# Patient Record
Sex: MALE | Race: WHITE | Employment: PART TIME | URBAN - METROPOLITAN AREA
[De-identification: names, ages, dates, MRNs, and addresses within clinical notes are randomized per-mention and may not be internally consistent; named-entity substitution may affect disease eponyms.]

---

## 2017-10-25 PROBLEM — E11.9 CONTROLLED TYPE 2 DIABETES MELLITUS WITHOUT COMPLICATION (HCC): Status: ACTIVE | Noted: 2017-10-25

## 2017-10-25 PROBLEM — J44.9 COPD (CHRONIC OBSTRUCTIVE PULMONARY DISEASE) (HCC): Status: ACTIVE | Noted: 2017-10-25

## 2017-10-25 PROBLEM — M17.0 PRIMARY OSTEOARTHRITIS OF BOTH KNEES: Status: ACTIVE | Noted: 2017-10-25

## 2017-10-25 PROBLEM — E66.01 OBESITY, MORBID, BMI 40.0-49.9 (HCC): Status: ACTIVE | Noted: 2017-10-25

## 2017-10-25 PROBLEM — R60.0 LOCALIZED EDEMA: Status: ACTIVE | Noted: 2017-10-25

## 2017-11-12 ENCOUNTER — APPOINTMENT (OUTPATIENT)
Dept: ULTRASOUND IMAGING | Age: 67
End: 2017-11-12
Payer: COMMERCIAL

## 2017-11-12 ENCOUNTER — HOSPITAL ENCOUNTER (EMERGENCY)
Age: 67
Discharge: HOME | End: 2017-11-12
Attending: EMERGENCY MEDICINE
Payer: COMMERCIAL

## 2017-11-12 VITALS
BODY MASS INDEX: 39.4 KG/M2 | OXYGEN SATURATION: 93 % | HEART RATE: 94 BPM | WEIGHT: 260 LBS | DIASTOLIC BLOOD PRESSURE: 81 MMHG | TEMPERATURE: 97.9 F | SYSTOLIC BLOOD PRESSURE: 140 MMHG | RESPIRATION RATE: 18 BRPM | HEIGHT: 68 IN

## 2017-11-12 DIAGNOSIS — L03.115 CELLULITIS OF RIGHT LEG: Primary | ICD-10-CM

## 2017-11-12 LAB
ABSOLUTE EOS #: 0.15 K/UL (ref 0–0.4)
ABSOLUTE IMMATURE GRANULOCYTE: ABNORMAL K/UL (ref 0–0.3)
ABSOLUTE LYMPH #: 2.94 K/UL (ref 1–4.8)
ABSOLUTE MONO #: 1.91 K/UL (ref 0.1–0.8)
ANION GAP SERPL CALCULATED.3IONS-SCNC: 13 MMOL/L (ref 9–17)
BASOPHILS # BLD: 1 %
BASOPHILS ABSOLUTE: 0.15 K/UL (ref 0–0.2)
BUN BLDV-MCNC: 15 MG/DL (ref 8–23)
BUN/CREAT BLD: ABNORMAL (ref 9–20)
CALCIUM SERPL-MCNC: 9.1 MG/DL (ref 8.6–10.4)
CHLORIDE BLD-SCNC: 94 MMOL/L (ref 98–107)
CO2: 28 MMOL/L (ref 20–31)
CREAT SERPL-MCNC: 0.61 MG/DL (ref 0.7–1.2)
DIFFERENTIAL TYPE: ABNORMAL
EOSINOPHILS RELATIVE PERCENT: 1 %
GFR AFRICAN AMERICAN: >60 ML/MIN
GFR NON-AFRICAN AMERICAN: >60 ML/MIN
GFR SERPL CREATININE-BSD FRML MDRD: ABNORMAL ML/MIN/{1.73_M2}
GFR SERPL CREATININE-BSD FRML MDRD: ABNORMAL ML/MIN/{1.73_M2}
GLUCOSE BLD-MCNC: 90 MG/DL (ref 70–99)
HCT VFR BLD CALC: 37.8 % (ref 41–53)
HEMOGLOBIN: 13 G/DL (ref 13.5–17.5)
IMMATURE GRANULOCYTES: ABNORMAL %
LYMPHOCYTES # BLD: 20 %
MCH RBC QN AUTO: 31.1 PG (ref 26–34)
MCHC RBC AUTO-ENTMCNC: 34.3 G/DL (ref 31–37)
MCV RBC AUTO: 90.6 FL (ref 80–100)
MONOCYTES # BLD: 13 %
MORPHOLOGY: NORMAL
PDW BLD-RTO: 13.1 % (ref 12.5–15.4)
PLATELET # BLD: 335 K/UL (ref 140–450)
PLATELET ESTIMATE: ABNORMAL
PMV BLD AUTO: 6.7 FL (ref 6–12)
POTASSIUM SERPL-SCNC: 3.9 MMOL/L (ref 3.7–5.3)
RBC # BLD: 4.17 M/UL (ref 4.5–5.9)
RBC # BLD: ABNORMAL 10*6/UL
SEG NEUTROPHILS: 65 %
SEGMENTED NEUTROPHILS ABSOLUTE COUNT: 9.55 K/UL (ref 1.8–7.7)
SODIUM BLD-SCNC: 135 MMOL/L (ref 135–144)
WBC # BLD: 14.7 K/UL (ref 3.5–11)
WBC # BLD: ABNORMAL 10*3/UL

## 2017-11-12 PROCEDURE — 2580000003 HC RX 258: Performed by: EMERGENCY MEDICINE

## 2017-11-12 PROCEDURE — 87040 BLOOD CULTURE FOR BACTERIA: CPT

## 2017-11-12 PROCEDURE — 36415 COLL VENOUS BLD VENIPUNCTURE: CPT

## 2017-11-12 PROCEDURE — 93971 EXTREMITY STUDY: CPT

## 2017-11-12 PROCEDURE — 6360000002 HC RX W HCPCS: Performed by: EMERGENCY MEDICINE

## 2017-11-12 PROCEDURE — 80048 BASIC METABOLIC PNL TOTAL CA: CPT

## 2017-11-12 PROCEDURE — 99284 EMERGENCY DEPT VISIT MOD MDM: CPT

## 2017-11-12 PROCEDURE — 96365 THER/PROPH/DIAG IV INF INIT: CPT

## 2017-11-12 PROCEDURE — 85025 COMPLETE CBC W/AUTO DIFF WBC: CPT

## 2017-11-12 PROCEDURE — 96366 THER/PROPH/DIAG IV INF ADDON: CPT

## 2017-11-12 RX ORDER — CEPHALEXIN 500 MG/1
500 CAPSULE ORAL 2 TIMES DAILY
Qty: 20 CAPSULE | Refills: 0 | Status: SHIPPED | OUTPATIENT
Start: 2017-11-12 | End: 2017-12-08

## 2017-11-12 RX ORDER — CYCLOBENZAPRINE HCL 10 MG
10 TABLET ORAL 3 TIMES DAILY PRN
COMMUNITY
End: 2017-12-29

## 2017-11-12 RX ORDER — SULFAMETHOXAZOLE AND TRIMETHOPRIM 800; 160 MG/1; MG/1
1 TABLET ORAL 2 TIMES DAILY
Qty: 20 TABLET | Refills: 0 | Status: SHIPPED | OUTPATIENT
Start: 2017-11-12 | End: 2017-11-22

## 2017-11-12 RX ORDER — OXYCODONE HYDROCHLORIDE AND ACETAMINOPHEN 5; 325 MG/1; MG/1
1 TABLET ORAL EVERY 4 HOURS PRN
COMMUNITY
End: 2018-01-10 | Stop reason: SDUPTHER

## 2017-11-12 RX ORDER — VANCOMYCIN HYDROCHLORIDE 1 G/200ML
1000 INJECTION, SOLUTION INTRAVENOUS ONCE
Status: COMPLETED | OUTPATIENT
Start: 2017-11-12 | End: 2017-11-12

## 2017-11-12 RX ADMIN — WATER 1 G: 1 INJECTION INTRAMUSCULAR; INTRAVENOUS; SUBCUTANEOUS at 16:14

## 2017-11-12 RX ADMIN — VANCOMYCIN HYDROCHLORIDE 1000 MG: 1 INJECTION, SOLUTION INTRAVENOUS at 16:14

## 2017-11-12 ASSESSMENT — PAIN DESCRIPTION - ORIENTATION: ORIENTATION: RIGHT

## 2017-11-12 ASSESSMENT — PAIN SCALES - GENERAL: PAINLEVEL_OUTOF10: 8

## 2017-11-12 ASSESSMENT — PAIN DESCRIPTION - LOCATION: LOCATION: LEG

## 2017-11-12 ASSESSMENT — PAIN DESCRIPTION - PAIN TYPE: TYPE: ACUTE PAIN

## 2017-11-12 NOTE — ED PROVIDER NOTES
IN)    Inhale into the lungs    CYCLOBENZAPRINE (FLEXERIL) 10 MG TABLET    Take 10 mg by mouth 3 times daily as needed for Muscle spasms    HYDROCHLOROTHIAZIDE (HYDRODIURIL) 25 MG TABLET        MELOXICAM (MOBIC) 15 MG TABLET        METFORMIN HCL PO    Take by mouth    MOEXIPRIL (UNIVASC) 15 MG TABLET        NAPROXEN PO    Take 1,000 mg by mouth daily     OXYCODONE-ACETAMINOPHEN (PERCOCET) 5-325 MG PER TABLET    Take 1 tablet by mouth every 4 hours as needed for Pain . RIVAROXABAN (XARELTO) 10 MG TABS TABLET    Take 10 mg by mouth       ALLERGIES     has No Known Allergies. FAMILY HISTORY     has no family status information on file. family history is not on file. SOCIAL HISTORY      reports that he quit smoking about 4 weeks ago. His smoking use included Cigarettes. He has a 42.00 pack-year smoking history. He has never used smokeless tobacco. He reports that he does not drink alcohol or use drugs. PHYSICAL EXAM     INITIAL VITALS:  height is 5' 8\" (1.727 m) and weight is 117.9 kg (260 lb). His oral temperature is 97.9 °F (36.6 °C). His blood pressure is 140/81 (abnormal) and his pulse is 94. His respiration is 18 and oxygen saturation is 93%. Constitutional: The patient is alert, well-developed, in no acute distress. Vital signs as noted. Right lower extremity: There is an incision over the anterior portion of the knee from his recent joint replacement. The wound edges are red. No purulence however at the distal portion of the incision there is an area that appears abraded. He has no thigh pain. There is marked edema and ecchymosis noted of the knee and the lower leg. There is no. Discharge rest at this time. There is no abscess or lymphangitis noted. There is pain throughout the right calf. Distal pulses are diminished but present. There is no cyanosis.   The right ankle and foot are normal.  Left leg appears normal.       DIFFERENTIAL DIAGNOSIS/ MEDICAL DECISION MAKING:

## 2017-11-12 NOTE — ED NOTES
Pt brought to ed by wife with c/o leg swelling s/p TKA last Tuesday. Pt states his leg has had increased swelling over the past day. He states that there is also a small opened area at the base of the incision that is draining and he feels \"is infected. \"  Pt reports that he called the surgeon who advised him to be seen at an ED. Upon arrival pt is awake, alert and appropriate. He is brought back via wheelchair and is able to transfer to the bed per self, however, he does appear to be very uncomfortable with weight bearing. There is obvious swelling and large amts of bruising noted to the entire leg. The leg is firm and warm to the touch. Steri-strips are in tact and there is a small opened area noted at the distal end of the steri-strips. Call light placed within reach. Denies needs - will continue to monitor.       Arley Ahn RN  11/12/17 [id number]

## 2017-11-18 LAB
CULTURE: NORMAL
Lab: NORMAL
SPECIMEN DESCRIPTION: NORMAL
STATUS: NORMAL
STATUS: NORMAL

## 2018-01-10 PROBLEM — M54.50 ACUTE MIDLINE LOW BACK PAIN WITHOUT SCIATICA: Status: ACTIVE | Noted: 2018-01-10

## 2018-04-05 PROBLEM — M51.36 DDD (DEGENERATIVE DISC DISEASE), LUMBAR: Status: ACTIVE | Noted: 2018-04-05

## 2018-04-05 PROBLEM — M51.369 DDD (DEGENERATIVE DISC DISEASE), LUMBAR: Status: ACTIVE | Noted: 2018-04-05

## 2018-08-02 ENCOUNTER — HOSPITAL ENCOUNTER (EMERGENCY)
Age: 68
Discharge: HOME | End: 2018-08-02
Attending: SPECIALIST
Payer: COMMERCIAL

## 2018-08-02 ENCOUNTER — APPOINTMENT (OUTPATIENT)
Dept: GENERAL RADIOLOGY | Age: 68
End: 2018-08-02
Payer: COMMERCIAL

## 2018-08-02 VITALS
TEMPERATURE: 97.6 F | HEART RATE: 86 BPM | HEIGHT: 67 IN | OXYGEN SATURATION: 97 % | SYSTOLIC BLOOD PRESSURE: 132 MMHG | DIASTOLIC BLOOD PRESSURE: 63 MMHG | RESPIRATION RATE: 17 BRPM | WEIGHT: 245 LBS | BODY MASS INDEX: 38.45 KG/M2

## 2018-08-02 DIAGNOSIS — M75.81 TENDINITIS OF RIGHT ROTATOR CUFF: ICD-10-CM

## 2018-08-02 DIAGNOSIS — S46.811A STRAIN OF RIGHT TRAPEZIUS MUSCLE, INITIAL ENCOUNTER: Primary | ICD-10-CM

## 2018-08-02 PROCEDURE — 73030 X-RAY EXAM OF SHOULDER: CPT

## 2018-08-02 PROCEDURE — 99283 EMERGENCY DEPT VISIT LOW MDM: CPT

## 2018-08-02 RX ORDER — CYCLOBENZAPRINE HCL 10 MG
10 TABLET ORAL 3 TIMES DAILY PRN
Qty: 15 TABLET | Refills: 0 | Status: SHIPPED | OUTPATIENT
Start: 2018-08-02 | End: 2018-08-14 | Stop reason: SDUPTHER

## 2018-08-02 ASSESSMENT — ENCOUNTER SYMPTOMS
SHORTNESS OF BREATH: 0
NAUSEA: 0
VOMITING: 0
TROUBLE SWALLOWING: 0
COUGH: 0

## 2018-08-02 ASSESSMENT — PAIN DESCRIPTION - ORIENTATION: ORIENTATION: RIGHT

## 2018-08-02 ASSESSMENT — PAIN DESCRIPTION - PAIN TYPE: TYPE: ACUTE PAIN

## 2018-08-02 ASSESSMENT — PAIN SCALES - GENERAL: PAINLEVEL_OUTOF10: 7

## 2018-08-02 ASSESSMENT — PAIN DESCRIPTION - LOCATION: LOCATION: NECK;SHOULDER

## 2018-08-03 NOTE — ED PROVIDER NOTES
[address] [facility] ED  eMERGENCY dEPARTMENT eNCOUnter      Pt Name: Hoang Salamanca. MRN: [id number]  Birthdate 1950  Date of evaluation: 8/2/2018  Provider: DONTAE Tinajero CNP    CHIEF COMPLAINT       Chief Complaint   Patient presents with    Shoulder Pain     neck and right shoulder pain that started Monday and is now radiating toward the front         HISTORY OF PRESENT ILLNESS  (Location/Symptom, Timing/Onset, Context/Setting, Quality, Duration, Modifying Factors, Severity.)   Hoang Salamanca is a 70 y.o. male who presents to the emergency department for evaluation of  orthopedic pain:    Location/Symptom:  Right shoulder pain  Timing/Onset: 4 days ago  Context/Setting: patient presents to ED with complaints of right shoulder pain and right-sided neck pain. States it started 4 days ago after he moved small freezer. Patient denies any radiation of pain to right arm. No numbness or tingling or weakness. States the pain is worsened with arm movement especially overhead reaching and lifting things. No prior history of right shoulder injury or rotator cuff issues. Patient is taking Percocet for chronic back pain and has appointment with a surgeon to discuss surgery. Patient saw his PCP yesterday and obtain surgery clearance but did not mention anything about the shoulder, states he forgot. No chest pain, shortness of breath ( except for chronic shortness of breath related to COPD but it is unchanged). No fever or chills. No swelling or bruising to the shoulder. Right-hand dominant. Quality:   Achy, sharp  Duration:   constant  Modifying Factors: Worse with movement, better with rest  Severity:   Mild-moderate      Nursing Notes were reviewed and I agree. REVIEW OF SYSTEMS    (2-9 systems for level 4, 10 or more for level 5)     Review of Systems   Constitutional: Negative for chills and fever. HENT: Negative for trouble swallowing.     Respiratory: Negative for cough and shortness of smoking use included Cigarettes. He has a 42.00 pack-year smoking history. He has never used smokeless tobacco. He reports that he does not drink alcohol or use drugs. Reviewed. PHYSICAL EXAM    (up to 7 for level 4, 8 or more for level 5)     ED Triage Vitals [08/02/18 1954]   BP Temp Temp Source Pulse Resp SpO2 Height Weight   132/63 97.6 °F (36.4 °C) Oral 86 17 97 % 5' 7\" (1.702 m) 245 lb (111.1 kg)       Physical Exam   Constitutional: He is oriented to person, place, and time. He appears well-developed and well-nourished. HENT:   Head: Normocephalic and atraumatic. Right Ear: External ear normal.   Left Ear: External ear normal.   Nose: Nose normal.   Eyes: Right eye exhibits no discharge. Left eye exhibits no discharge. No scleral icterus. Neck: Normal range of motion. Cardiovascular: Normal rate and regular rhythm. Pulmonary/Chest: Effort normal and breath sounds normal. No stridor. No respiratory distress. Musculoskeletal: Normal range of motion. He exhibits no edema. Right shoulder: He exhibits tenderness and pain. He exhibits no bony tenderness. Right shoulder pain. Tenderness to palpation over right anterior shoulder, right clavicle and right sided trapezius muscle. Range of motion of right shoulder limited, especially with overhead reaching, causes more pain. Radial/ulnar pulses +2, sensation intact, cap refill less than 2 seconds. No midline cervical tenderness on palpation. Right-sided paraspinal cervical tenderness with no skin changes, swelling or erythema. Full range of motion of the neck. Neurological: He is alert and oriented to person, place, and time. Coordination normal.   Skin: Skin is warm and dry. He is not diaphoretic. Psychiatric: He has a normal mood and affect.  His behavior is normal.       DIAGNOSTIC RESULTS     RADIOLOGY:   [] Visualized by me  And Sowmya Trujillo MD    Xr Shoulder Right (min 2 Views)    Result Date: 8/2/2018  EXAMINATION: 3 XRAY VIEWS OF THE RIGHT SHOULDER 8/2/2018 8:23 pm COMPARISON: None. HISTORY: ORDERING SYSTEM PROVIDED HISTORY: injury TECHNOLOGIST PROVIDED HISTORY: Reason for exam:->injury Ordering Physician Provided Reason for Exam: right shoulder pain x 4 days after lifting a freezer. Pain with lifting and overhead reaching. Denies numbness or tingling. Acuity: Acute Type of Exam: Initial Mechanism of Injury: lifting a freezer Relevant Medical/Surgical History: No right shoulder surg. FINDINGS: Glenohumeral joint is normally aligned. No evidence of acute fracture or dislocation. No abnormal periarticular calcifications. The [facility] joint is unremarkable in appearance. 7 mm linear radiodense foreign body projects over the posterior soft tissue in the deltoid region. The visualized lung reveals emphysematous disease. No acute osseous abnormality or significant arthropathic features. Linear radiodense foreign body projects over the soft tissue of the posterior deltoid region. LABS:  Labs Reviewed - No data to display    All other labs were within normal range or not returned as of this dictation. EMERGENCY DEPARTMENT COURSE and DIFFERENTIAL DIAGNOSIS/MDM:   Patient to ED for evaluation of shoulder pain after moving a freezer. X-ray unremarkable, no acute fracture or dislocation. There is a linear radiodense foreign body over posterior deltoid, patient does not recollect any trauma or injury or foreign bodies to that area but was notified of the results. Patient has pain medication at home. Given prescription for some muscle relaxers. Recommended ice pack, elevation, follow up with PCP for reevaluation. Okay to discharge home. Follow-up with PCP as directed. Return to ED if worse. Vitals:    Vitals:    08/02/18 1954   BP: 132/63   Pulse: 86   Resp: 17   Temp: 97.6 °F (36.4 °C)   TempSrc: Oral   SpO2: 97%   Weight: 111.1 kg (245 lb)   Height: 5' 7\" (1.702 m)         Vitals reviewed.      PROCEDURES:  none    FINAL

## 2018-08-03 NOTE — ED NOTES
Pt cleared for discharge per MD. Pt discharge instructions explained. Prescription provided. Pt verbalizes understanding of all instructions and all patient questions answered to their satisfaction. Pt departs from ER ambulatory and in stable condition.      Nora Dsouza RN  08/02/18 9745

## 2018-08-03 NOTE — ED PROVIDER NOTES
Emergency Department     Faculty Attestation    I performed a history and physical examination of the patient and discussed management with the mid level provider. I reviewed the mid level provider's note and agree with the documented findings and plan of care. Any areas of disagreement are noted on the chart. I was personally present for the key portions of any procedures. I have documented in the chart those procedures where I was not present during the key portions. I have reviewed the emergency nurses triage note. I agree with the chief complaint, past medical history, past surgical history, allergies, medications, social and family history as documented unless otherwise noted below. Documentation of the HPI, Physical Exam and Medical Decision Making performed by medical students or scribes is based on my personal performance of the HPI, PE and MDM. For Physician Assistant/ Nurse Practitioner cases/documentation I have personally evaluated this patient and have completed at least one if not all key elements of the E/M (history, physical exam, and MDM). Additional findings are as noted. Primary Care Physician:  Ellen Kumar MD    [address] [place] [address]       Chief Complaint   Patient presents with    Shoulder Pain     neck and right shoulder pain that started Monday and is now radiating toward the front       RECENT VITALS:   Temp: 97.6 °F (36.4 °C),  Pulse: 86, Resp: 17, BP: 132/63    LABS:  Labs Reviewed - No data to display      PERTINENT ATTENDING PHYSICIAN COMMENTS:    69-year-old male patient presents to the emergency department complaining of right shoulder pain and right-sided neck pain since 4 days after he moved a small freezer. Patient denies hearing a pop or snap in the right shoulder. No history of fall or injuries. She patient denies any tingling, numbness or weakness in the right upper extremity.   He denies any chest pain, shortness of breath, nausea, palpitations or

## 2018-12-17 DIAGNOSIS — E11.9 CONTROLLED TYPE 2 DIABETES MELLITUS WITHOUT COMPLICATION, WITHOUT LONG-TERM CURRENT USE OF INSULIN (HCC): ICD-10-CM

## 2018-12-20 DIAGNOSIS — M54.50 ACUTE MIDLINE LOW BACK PAIN WITHOUT SCIATICA: ICD-10-CM

## 2018-12-20 RX ORDER — IBUPROFEN 800 MG/1
800 TABLET ORAL EVERY 8 HOURS PRN
Qty: 90 TABLET | Refills: 1 | Status: SHIPPED | OUTPATIENT
Start: 2018-12-20 | End: 2019-03-01 | Stop reason: SDUPTHER

## 2018-12-20 RX ORDER — OXYCODONE AND ACETAMINOPHEN 10; 325 MG/1; MG/1
1 TABLET ORAL EVERY 4 HOURS PRN
Qty: 150 TABLET | Refills: 0 | Status: SHIPPED | OUTPATIENT
Start: 2018-12-20 | End: 2019-01-28 | Stop reason: SDUPTHER

## 2018-12-28 ENCOUNTER — OFFICE VISIT (OUTPATIENT)
Dept: PRIMARY CARE CLINIC | Age: 68
End: 2018-12-28
Payer: COMMERCIAL

## 2018-12-28 VITALS
SYSTOLIC BLOOD PRESSURE: 136 MMHG | BODY MASS INDEX: 40.78 KG/M2 | HEART RATE: 81 BPM | OXYGEN SATURATION: 95 % | DIASTOLIC BLOOD PRESSURE: 74 MMHG | WEIGHT: 260.4 LBS

## 2018-12-28 DIAGNOSIS — J21.9 ACUTE BRONCHIOLITIS DUE TO UNSPECIFIED ORGANISM: Primary | ICD-10-CM

## 2018-12-28 DIAGNOSIS — J44.9 CHRONIC OBSTRUCTIVE PULMONARY DISEASE, UNSPECIFIED COPD TYPE (HCC): ICD-10-CM

## 2018-12-28 PROCEDURE — 1101F PT FALLS ASSESS-DOCD LE1/YR: CPT | Performed by: FAMILY MEDICINE

## 2018-12-28 PROCEDURE — G8482 FLU IMMUNIZE ORDER/ADMIN: HCPCS | Performed by: FAMILY MEDICINE

## 2018-12-28 PROCEDURE — G8417 CALC BMI ABV UP PARAM F/U: HCPCS | Performed by: FAMILY MEDICINE

## 2018-12-28 PROCEDURE — 1036F TOBACCO NON-USER: CPT | Performed by: FAMILY MEDICINE

## 2018-12-28 PROCEDURE — 1123F ACP DISCUSS/DSCN MKR DOCD: CPT | Performed by: FAMILY MEDICINE

## 2018-12-28 PROCEDURE — 3017F COLORECTAL CA SCREEN DOC REV: CPT | Performed by: FAMILY MEDICINE

## 2018-12-28 PROCEDURE — 4040F PNEUMOC VAC/ADMIN/RCVD: CPT | Performed by: FAMILY MEDICINE

## 2018-12-28 PROCEDURE — G8926 SPIRO NO PERF OR DOC: HCPCS | Performed by: FAMILY MEDICINE

## 2018-12-28 PROCEDURE — G8427 DOCREV CUR MEDS BY ELIG CLIN: HCPCS | Performed by: FAMILY MEDICINE

## 2018-12-28 PROCEDURE — 99213 OFFICE O/P EST LOW 20 MIN: CPT | Performed by: FAMILY MEDICINE

## 2018-12-28 PROCEDURE — 96372 THER/PROPH/DIAG INJ SC/IM: CPT | Performed by: FAMILY MEDICINE

## 2018-12-28 PROCEDURE — 3023F SPIROM DOC REV: CPT | Performed by: FAMILY MEDICINE

## 2018-12-28 RX ORDER — CEFTRIAXONE 1 G/1
1 INJECTION, POWDER, FOR SOLUTION INTRAMUSCULAR; INTRAVENOUS ONCE
Qty: 1 G | Refills: 0
Start: 2018-12-28 | End: 2018-12-28 | Stop reason: CLARIF

## 2018-12-28 RX ORDER — CEFTRIAXONE 1 G/1
1 INJECTION, POWDER, FOR SOLUTION INTRAMUSCULAR; INTRAVENOUS ONCE
Status: COMPLETED | OUTPATIENT
Start: 2018-12-28 | End: 2018-12-28

## 2018-12-28 RX ORDER — AZITHROMYCIN 250 MG/1
TABLET, FILM COATED ORAL
Qty: 1 PACKET | Refills: 0 | Status: SHIPPED | OUTPATIENT
Start: 2018-12-28 | End: 2019-02-13

## 2018-12-28 RX ADMIN — CEFTRIAXONE 1 G: 1 INJECTION, POWDER, FOR SOLUTION INTRAMUSCULAR; INTRAVENOUS at 16:44

## 2018-12-28 ASSESSMENT — ENCOUNTER SYMPTOMS
SHORTNESS OF BREATH: 1
SORE THROAT: 0
NAUSEA: 0
COUGH: 1
VOMITING: 0
DIARRHEA: 0
RHINORRHEA: 1

## 2019-01-28 DIAGNOSIS — M54.50 ACUTE MIDLINE LOW BACK PAIN WITHOUT SCIATICA: ICD-10-CM

## 2019-01-28 RX ORDER — OXYCODONE AND ACETAMINOPHEN 10; 325 MG/1; MG/1
1 TABLET ORAL EVERY 4 HOURS PRN
Qty: 150 TABLET | Refills: 0 | Status: SHIPPED | OUTPATIENT
Start: 2019-01-28 | End: 2019-02-13 | Stop reason: SDUPTHER

## 2019-02-13 ENCOUNTER — OFFICE VISIT (OUTPATIENT)
Dept: PRIMARY CARE CLINIC | Age: 69
End: 2019-02-13
Payer: COMMERCIAL

## 2019-02-13 VITALS
WEIGHT: 263.4 LBS | HEIGHT: 67 IN | DIASTOLIC BLOOD PRESSURE: 70 MMHG | OXYGEN SATURATION: 95 % | HEART RATE: 90 BPM | SYSTOLIC BLOOD PRESSURE: 122 MMHG | BODY MASS INDEX: 41.34 KG/M2

## 2019-02-13 DIAGNOSIS — M54.50 ACUTE MIDLINE LOW BACK PAIN WITHOUT SCIATICA: ICD-10-CM

## 2019-02-13 DIAGNOSIS — Z72.0 TOBACCO ABUSE: ICD-10-CM

## 2019-02-13 DIAGNOSIS — E11.9 CONTROLLED TYPE 2 DIABETES MELLITUS WITHOUT COMPLICATION, WITHOUT LONG-TERM CURRENT USE OF INSULIN (HCC): ICD-10-CM

## 2019-02-13 DIAGNOSIS — E66.01 OBESITY, MORBID, BMI 40.0-49.9 (HCC): ICD-10-CM

## 2019-02-13 DIAGNOSIS — J44.9 CHRONIC OBSTRUCTIVE PULMONARY DISEASE, UNSPECIFIED COPD TYPE (HCC): Primary | ICD-10-CM

## 2019-02-13 DIAGNOSIS — E55.9 VITAMIN D DEFICIENCY: ICD-10-CM

## 2019-02-13 PROCEDURE — G8427 DOCREV CUR MEDS BY ELIG CLIN: HCPCS | Performed by: FAMILY MEDICINE

## 2019-02-13 PROCEDURE — 1036F TOBACCO NON-USER: CPT | Performed by: FAMILY MEDICINE

## 2019-02-13 PROCEDURE — 1101F PT FALLS ASSESS-DOCD LE1/YR: CPT | Performed by: FAMILY MEDICINE

## 2019-02-13 PROCEDURE — 4040F PNEUMOC VAC/ADMIN/RCVD: CPT | Performed by: FAMILY MEDICINE

## 2019-02-13 PROCEDURE — 1123F ACP DISCUSS/DSCN MKR DOCD: CPT | Performed by: FAMILY MEDICINE

## 2019-02-13 PROCEDURE — 3017F COLORECTAL CA SCREEN DOC REV: CPT | Performed by: FAMILY MEDICINE

## 2019-02-13 PROCEDURE — G8926 SPIRO NO PERF OR DOC: HCPCS | Performed by: FAMILY MEDICINE

## 2019-02-13 PROCEDURE — 99214 OFFICE O/P EST MOD 30 MIN: CPT | Performed by: FAMILY MEDICINE

## 2019-02-13 PROCEDURE — G8417 CALC BMI ABV UP PARAM F/U: HCPCS | Performed by: FAMILY MEDICINE

## 2019-02-13 PROCEDURE — G8482 FLU IMMUNIZE ORDER/ADMIN: HCPCS | Performed by: FAMILY MEDICINE

## 2019-02-13 PROCEDURE — 2022F DILAT RTA XM EVC RTNOPTHY: CPT | Performed by: FAMILY MEDICINE

## 2019-02-13 PROCEDURE — 3023F SPIROM DOC REV: CPT | Performed by: FAMILY MEDICINE

## 2019-02-13 PROCEDURE — 3046F HEMOGLOBIN A1C LEVEL >9.0%: CPT | Performed by: FAMILY MEDICINE

## 2019-02-13 RX ORDER — ERGOCALCIFEROL (VITAMIN D2) 50 MCG
1 CAPSULE ORAL DAILY
Qty: 90 CAPSULE | Refills: 3
Start: 2019-02-13 | End: 2021-01-13

## 2019-02-13 RX ORDER — VARENICLINE TARTRATE 1 MG/1
1 TABLET, FILM COATED ORAL 2 TIMES DAILY
Qty: 60 TABLET | Refills: 3 | Status: SHIPPED | OUTPATIENT
Start: 2019-02-13 | End: 2019-05-20 | Stop reason: ALTCHOICE

## 2019-02-13 RX ORDER — OXYCODONE AND ACETAMINOPHEN 10; 325 MG/1; MG/1
1 TABLET ORAL EVERY 6 HOURS PRN
Qty: 120 TABLET | Refills: 0
Start: 2019-02-13 | End: 2019-03-05 | Stop reason: SDUPTHER

## 2019-02-13 RX ORDER — DOXYCYCLINE HYCLATE 100 MG
100 TABLET ORAL 2 TIMES DAILY
Qty: 30 TABLET | Refills: 0 | Status: SHIPPED | OUTPATIENT
Start: 2019-02-13 | End: 2019-02-28

## 2019-02-13 RX ORDER — VARENICLINE TARTRATE 25 MG
KIT ORAL
Qty: 53 TABLET | Refills: 0 | Status: SHIPPED | OUTPATIENT
Start: 2019-02-13 | End: 2019-05-20 | Stop reason: ALTCHOICE

## 2019-02-13 RX ORDER — ATORVASTATIN CALCIUM 20 MG/1
20 TABLET, FILM COATED ORAL NIGHTLY
Qty: 90 TABLET | Refills: 3 | Status: SHIPPED | OUTPATIENT
Start: 2019-02-13 | End: 2020-02-24

## 2019-02-13 ASSESSMENT — ENCOUNTER SYMPTOMS
SORE THROAT: 0
EYE REDNESS: 0
DIARRHEA: 0
COUGH: 0
WHEEZING: 0
SHORTNESS OF BREATH: 0
BACK PAIN: 1
RHINORRHEA: 0
NAUSEA: 0
ABDOMINAL PAIN: 0
EYE DISCHARGE: 0
VOMITING: 0

## 2019-02-13 ASSESSMENT — PATIENT HEALTH QUESTIONNAIRE - PHQ9
2. FEELING DOWN, DEPRESSED OR HOPELESS: 0
SUM OF ALL RESPONSES TO PHQ QUESTIONS 1-9: 0
1. LITTLE INTEREST OR PLEASURE IN DOING THINGS: 0
SUM OF ALL RESPONSES TO PHQ9 QUESTIONS 1 & 2: 0
SUM OF ALL RESPONSES TO PHQ QUESTIONS 1-9: 0

## 2019-02-14 ENCOUNTER — TELEPHONE (OUTPATIENT)
Dept: PRIMARY CARE CLINIC | Age: 69
End: 2019-02-14

## 2019-02-14 DIAGNOSIS — J44.9 CHRONIC OBSTRUCTIVE PULMONARY DISEASE, UNSPECIFIED COPD TYPE (HCC): ICD-10-CM

## 2019-02-14 DIAGNOSIS — M51.36 DDD (DEGENERATIVE DISC DISEASE), LUMBAR: Primary | ICD-10-CM

## 2019-02-14 DIAGNOSIS — M17.0 PRIMARY OSTEOARTHRITIS OF BOTH KNEES: ICD-10-CM

## 2019-02-14 RX ORDER — BUDESONIDE AND FORMOTEROL FUMARATE DIHYDRATE 160; 4.5 UG/1; UG/1
AEROSOL RESPIRATORY (INHALATION)
Qty: 10.2 G | Refills: 4 | Status: SHIPPED | OUTPATIENT
Start: 2019-02-14 | End: 2019-05-06 | Stop reason: SDUPTHER

## 2019-02-15 PROBLEM — M48.061 LUMBAR SPINAL STENOSIS: Status: ACTIVE | Noted: 2018-10-24

## 2019-02-26 DIAGNOSIS — M54.50 ACUTE MIDLINE LOW BACK PAIN WITHOUT SCIATICA: ICD-10-CM

## 2019-02-26 RX ORDER — OXYCODONE AND ACETAMINOPHEN 10; 325 MG/1; MG/1
1 TABLET ORAL EVERY 6 HOURS PRN
Qty: 120 TABLET | Refills: 0 | OUTPATIENT
Start: 2019-02-26 | End: 2019-03-28

## 2019-02-26 RX ORDER — IBUPROFEN 800 MG/1
800 TABLET ORAL EVERY 8 HOURS PRN
Qty: 90 TABLET | Refills: 1 | OUTPATIENT
Start: 2019-02-26

## 2019-03-01 RX ORDER — IBUPROFEN 800 MG/1
800 TABLET ORAL EVERY 8 HOURS PRN
Qty: 90 TABLET | Refills: 1 | Status: SHIPPED | OUTPATIENT
Start: 2019-03-01 | End: 2019-05-06 | Stop reason: SDUPTHER

## 2019-03-05 DIAGNOSIS — M54.50 ACUTE MIDLINE LOW BACK PAIN WITHOUT SCIATICA: ICD-10-CM

## 2019-03-05 RX ORDER — OXYCODONE AND ACETAMINOPHEN 10; 325 MG/1; MG/1
1 TABLET ORAL EVERY 6 HOURS PRN
Qty: 120 TABLET | Refills: 0 | Status: SHIPPED | OUTPATIENT
Start: 2019-03-05 | End: 2019-04-05 | Stop reason: SDUPTHER

## 2019-04-05 DIAGNOSIS — M54.50 ACUTE MIDLINE LOW BACK PAIN WITHOUT SCIATICA: ICD-10-CM

## 2019-04-05 RX ORDER — OXYCODONE AND ACETAMINOPHEN 10; 325 MG/1; MG/1
1 TABLET ORAL EVERY 6 HOURS PRN
Qty: 120 TABLET | Refills: 0 | Status: SHIPPED | OUTPATIENT
Start: 2019-04-05 | End: 2019-05-06 | Stop reason: SDUPTHER

## 2019-04-11 DIAGNOSIS — J44.9 CHRONIC OBSTRUCTIVE PULMONARY DISEASE, UNSPECIFIED COPD TYPE (HCC): ICD-10-CM

## 2019-04-12 RX ORDER — HYDROCHLOROTHIAZIDE 25 MG/1
TABLET ORAL
Qty: 30 TABLET | Refills: 4 | Status: SHIPPED | OUTPATIENT
Start: 2019-04-12 | End: 2019-08-26 | Stop reason: SDUPTHER

## 2019-05-06 DIAGNOSIS — M54.50 ACUTE MIDLINE LOW BACK PAIN WITHOUT SCIATICA: ICD-10-CM

## 2019-05-06 RX ORDER — OXYCODONE AND ACETAMINOPHEN 10; 325 MG/1; MG/1
1 TABLET ORAL EVERY 6 HOURS PRN
Qty: 120 TABLET | Refills: 0 | Status: SHIPPED | OUTPATIENT
Start: 2019-05-06 | End: 2019-06-05 | Stop reason: SDUPTHER

## 2019-05-06 RX ORDER — IBUPROFEN 800 MG/1
800 TABLET ORAL EVERY 8 HOURS PRN
Qty: 90 TABLET | Refills: 1 | Status: SHIPPED | OUTPATIENT
Start: 2019-05-06 | End: 2019-06-05

## 2019-05-06 RX ORDER — BUDESONIDE AND FORMOTEROL FUMARATE DIHYDRATE 160; 4.5 UG/1; UG/1
2 AEROSOL RESPIRATORY (INHALATION) 2 TIMES DAILY
Qty: 10.2 G | Refills: 4 | Status: SHIPPED | OUTPATIENT
Start: 2019-05-06 | End: 2020-01-20

## 2019-05-06 RX ORDER — ALBUTEROL SULFATE 90 UG/1
2 AEROSOL, METERED RESPIRATORY (INHALATION) EVERY 4 HOURS PRN
Qty: 1 INHALER | Refills: 5 | Status: SHIPPED | OUTPATIENT
Start: 2019-05-06 | End: 2019-11-20 | Stop reason: SDUPTHER

## 2019-05-09 ENCOUNTER — TELEPHONE (OUTPATIENT)
Dept: PRIMARY CARE CLINIC | Age: 69
End: 2019-05-09

## 2019-05-09 NOTE — TELEPHONE ENCOUNTER
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 Please advise

## 2019-05-20 ENCOUNTER — OFFICE VISIT (OUTPATIENT)
Dept: PRIMARY CARE CLINIC | Age: 69
End: 2019-05-20
Payer: COMMERCIAL

## 2019-05-20 VITALS
DIASTOLIC BLOOD PRESSURE: 70 MMHG | OXYGEN SATURATION: 97 % | WEIGHT: 263.8 LBS | BODY MASS INDEX: 41.37 KG/M2 | SYSTOLIC BLOOD PRESSURE: 120 MMHG | TEMPERATURE: 98 F | HEART RATE: 96 BPM

## 2019-05-20 DIAGNOSIS — Z12.11 SCREENING FOR COLON CANCER: ICD-10-CM

## 2019-05-20 DIAGNOSIS — J44.1 COPD EXACERBATION (HCC): ICD-10-CM

## 2019-05-20 DIAGNOSIS — J20.9 ACUTE BRONCHITIS, UNSPECIFIED ORGANISM: Primary | ICD-10-CM

## 2019-05-20 PROCEDURE — 1123F ACP DISCUSS/DSCN MKR DOCD: CPT | Performed by: PHYSICIAN ASSISTANT

## 2019-05-20 PROCEDURE — 4040F PNEUMOC VAC/ADMIN/RCVD: CPT | Performed by: PHYSICIAN ASSISTANT

## 2019-05-20 PROCEDURE — 4004F PT TOBACCO SCREEN RCVD TLK: CPT | Performed by: PHYSICIAN ASSISTANT

## 2019-05-20 PROCEDURE — 3023F SPIROM DOC REV: CPT | Performed by: PHYSICIAN ASSISTANT

## 2019-05-20 PROCEDURE — G8926 SPIRO NO PERF OR DOC: HCPCS | Performed by: PHYSICIAN ASSISTANT

## 2019-05-20 PROCEDURE — G8417 CALC BMI ABV UP PARAM F/U: HCPCS | Performed by: PHYSICIAN ASSISTANT

## 2019-05-20 PROCEDURE — 3017F COLORECTAL CA SCREEN DOC REV: CPT | Performed by: PHYSICIAN ASSISTANT

## 2019-05-20 PROCEDURE — 99213 OFFICE O/P EST LOW 20 MIN: CPT | Performed by: PHYSICIAN ASSISTANT

## 2019-05-20 PROCEDURE — G8427 DOCREV CUR MEDS BY ELIG CLIN: HCPCS | Performed by: PHYSICIAN ASSISTANT

## 2019-05-20 RX ORDER — PREDNISONE 20 MG/1
40 TABLET ORAL DAILY
Qty: 10 TABLET | Refills: 0 | Status: SHIPPED | OUTPATIENT
Start: 2019-05-20 | End: 2019-05-25

## 2019-05-20 RX ORDER — DOXYCYCLINE HYCLATE 100 MG/1
100 CAPSULE ORAL 2 TIMES DAILY
Qty: 20 CAPSULE | Refills: 0 | Status: SHIPPED | OUTPATIENT
Start: 2019-05-20 | End: 2019-05-30

## 2019-05-20 ASSESSMENT — ENCOUNTER SYMPTOMS
WHEEZING: 1
SINUS PRESSURE: 0
SHORTNESS OF BREATH: 1
RHINORRHEA: 1
CHEST TIGHTNESS: 1
COUGH: 1
SORE THROAT: 1

## 2019-05-20 NOTE — PROGRESS NOTES
[address] [facility] PRIMARY CARE  [address] [address] [facility]  Dept: 700 Yonathan Aguayo is a 78 y.o. male who presents today for his medical conditions/complaints as noted below. Chief Complaint   Patient presents with    Cough     productive cough a little over a week. Pt has tried Dayquil and Nyquil    Nasal Congestion      runny/stuffy nose x 1 week    Chest Congestion     x 1 week    Pharyngitis     Pt states intermittent slight sore throat. HPI:     HPI   Due for annual FIT test. No family hx of colon cancer. Sick for a little over 1 week. No fever. Congestion and runny nose. No sinus pressure. No ear pain. Occasional sore throat, but halls help. Denies chronic cough. Productive cough: sputum was clear, but has become green. COPD- Symbicort bid. Using albuterol about tid currently, has increased use with illness (usually uses it 1-2x per day). Increased SOB, but albuterol seems to help. Dayquil/nyquil does help the congestion and cough. 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 Has nicotine patches at home. LDL Calculated (mg/dL)   Date Value   01/12/2018 65       (goal LDL is <100)   BUN (mg/dL)   Date Value   11/12/2017 15     BP Readings from Last 3 Encounters:   05/20/19 120/70   02/13/19 122/70   12/28/18 136/74          (goal 120/80)    Past Medical History:   Diagnosis Date    Arthritis     COPD (chronic obstructive pulmonary disease) ([address]     Diabetes mellitus ([address]     GURPREET on CPAP       Past Surgical History:   Procedure Laterality Date    APPENDECTOMY      CHOLECYSTECTOMY  25 years ago    JOINT REPLACEMENT Right 11/07/2017    LUMBAR LAMINECTOMY  10/24/2018    L2-L5 laminectomy, foraminotomy    TONSILLECTOMY      TOTAL KNEE ARTHROPLASTY Left 02/01/2017       History reviewed. No pertinent family history.     Social History     Tobacco Use    Smoking status: Current Every Day Smoker     Packs/day: 1.00     Years: 42.00     Pack years: 42.00     Types: Cigarettes     Last attempt to quit: 10/12/2017     Years since quittin.6    Smokeless tobacco: Never Used   Substance Use Topics    Alcohol use: No      Current Outpatient Medications   Medication Sig Dispense Refill    doxycycline hyclate (VIBRAMYCIN) 100 MG capsule Take 1 capsule by mouth 2 times daily for 10 days Take with food, but avoid dairy, calcium and MTV's 2 hours before and after the dose 20 capsule 0    predniSONE (DELTASONE) 20 MG tablet Take 2 tablets by mouth daily for 5 days 10 tablet 0    ibuprofen (ADVIL;MOTRIN) 800 MG tablet Take 1 tablet by mouth every 8 hours as needed for Pain 90 tablet 1    oxyCODONE-acetaminophen (PERCOCET)  MG per tablet Take 1 tablet by mouth every 6 hours as needed for Pain for up to 30 days. 120 tablet 0    budesonide-formoterol (SYMBICORT) 160-4.5 MCG/ACT AERO Inhale 2 puffs into the lungs 2 times daily 10.2 g 4    albuterol sulfate HFA (PROAIR HFA) 108 (90 Base) MCG/ACT inhaler Inhale 2 puffs into the lungs every 4 hours as needed for Shortness of Breath 1 Inhaler 5    hydrochlorothiazide (HYDRODIURIL) 25 MG tablet TAKE 1 TABLET BY MOUTH ONE TIME A DAY  30 tablet 4    atorvastatin (LIPITOR) 20 MG tablet Take 1 tablet by mouth nightly 90 tablet 3    Vitamin D, Ergocalciferol, 2000 units CAPS Take 1 capsule by mouth daily 90 capsule 3    metFORMIN (GLUCOPHAGE) 1000 MG tablet Take 0.5 tablets by mouth daily (with breakfast) 45 tablet 3    benazepril (LOTENSIN) 20 MG tablet Take 1 tablet by mouth daily 90 tablet 3    ammonium lactate (LAC-HYDRIN) 12 % lotion Apply topically daily. 225 mL 2     No current facility-administered medications for this visit.       No Known Allergies    Health Maintenance   Topic Date Due    Diabetic retinal exam  1960    Shingles Vaccine (1 of 2) 1999    Colon Cancer Screen FIT/FOBT  2019    Diabetic foot exam  05/03/2019    A1C test (Diabetic or Prediabetic)  02/01/2020    Diabetic microalbuminuria test  02/01/2020    Lipid screen  02/01/2020    Potassium monitoring  02/01/2020    Creatinine monitoring  02/01/2020    Low dose CT lung screening  04/03/2020    DTaP/Tdap/Td vaccine (2 - Td) 07/01/2020    Flu vaccine  Completed    Pneumococcal 65+ years Vaccine  Completed    AAA screen  Completed    Hepatitis C screen  Completed       Subjective:      Review of Systems   Constitutional: Negative for fever. HENT: Positive for congestion, rhinorrhea and sore throat. Negative for ear pain and sinus pressure. Respiratory: Positive for cough, chest tightness, shortness of breath and wheezing. Objective:     /70   Pulse 96   Temp 98 °F (36.7 °C)   Wt 263 lb 12.8 oz (119.7 kg)   SpO2 97%   BMI 41.37 kg/m²   Physical Exam   Constitutional: He appears well-developed and well-nourished. No distress. HENT:   Head: Normocephalic and atraumatic. Right Ear: Tympanic membrane, external ear and ear canal normal.   Left Ear: Tympanic membrane, external ear and ear canal normal.   Nose: Right sinus exhibits no maxillary sinus tenderness and no frontal sinus tenderness. Left sinus exhibits no maxillary sinus tenderness and no frontal sinus tenderness. Mouth/Throat: No oropharyngeal exudate. Cardiovascular: Normal rate, regular rhythm and normal heart sounds. Pulmonary/Chest: Effort normal. No respiratory distress. He has decreased breath sounds (throughout). He has no wheezes. Lymphadenopathy:     He has no cervical adenopathy. Skin: Skin is warm and dry. No rash noted. He is not diaphoretic. Nursing note and vitals reviewed. Assessment:       Diagnosis Orders   1. Acute bronchitis, unspecified organism  doxycycline hyclate (VIBRAMYCIN) 100 MG capsule    predniSONE (DELTASONE) 20 MG tablet   2.  COPD exacerbation (HCC)  doxycycline hyclate (VIBRAMYCIN) 100 MG capsule predniSONE (DELTASONE) 20 MG tablet   3. Screening for colon cancer          Plan:    1-2 Bronchitis/COPD exacerbation- Rx for doxcycline and 5 days of prednisone. Continue to utilize albuterol and work on smoking cessation. 3. Given annual FIT test for colon cancer screening. Return if symptoms worsen or fail to improve. No orders of the defined types were placed in this encounter. Orders Placed This Encounter   Medications    doxycycline hyclate (VIBRAMYCIN) 100 MG capsule     Sig: Take 1 capsule by mouth 2 times daily for 10 days Take with food, but avoid dairy, calcium and MTV's 2 hours before and after the dose     Dispense:  20 capsule     Refill:  0    predniSONE (DELTASONE) 20 MG tablet     Sig: Take 2 tablets by mouth daily for 5 days     Dispense:  10 tablet     Refill:  0       Patient given educationalmaterials - see patient instructions. Discussed use, benefit, and side effectsof prescribed medications. All patient questions answered. Pt voiced understanding. Reviewed health maintenance. Instructed to continue current medications, diet andexercise. Patient agreed with treatment plan. Follow up as directed.      Electronicallysigned by Micah Andrews PA-C on 5/20/2019 at 9:28 AM

## 2019-05-22 ENCOUNTER — PROCEDURE VISIT (OUTPATIENT)
Dept: PRIMARY CARE CLINIC | Age: 69
End: 2019-05-22
Payer: COMMERCIAL

## 2019-05-22 ENCOUNTER — OFFICE VISIT (OUTPATIENT)
Dept: PRIMARY CARE CLINIC | Age: 69
End: 2019-05-22
Payer: COMMERCIAL

## 2019-05-22 VITALS
DIASTOLIC BLOOD PRESSURE: 78 MMHG | WEIGHT: 263.6 LBS | BODY MASS INDEX: 41.33 KG/M2 | SYSTOLIC BLOOD PRESSURE: 140 MMHG | OXYGEN SATURATION: 92 % | HEART RATE: 81 BPM

## 2019-05-22 DIAGNOSIS — Z12.11 COLON CANCER SCREENING: Primary | ICD-10-CM

## 2019-05-22 DIAGNOSIS — E11.9 CONTROLLED TYPE 2 DIABETES MELLITUS WITHOUT COMPLICATION, WITHOUT LONG-TERM CURRENT USE OF INSULIN (HCC): Primary | ICD-10-CM

## 2019-05-22 DIAGNOSIS — J44.9 CHRONIC OBSTRUCTIVE PULMONARY DISEASE, UNSPECIFIED COPD TYPE (HCC): ICD-10-CM

## 2019-05-22 DIAGNOSIS — J20.9 ACUTE BRONCHITIS, UNSPECIFIED ORGANISM: ICD-10-CM

## 2019-05-22 DIAGNOSIS — Z72.0 TOBACCO ABUSE: ICD-10-CM

## 2019-05-22 LAB
CONTROL: PRESENT
CREATININE URINE POCT: ABNORMAL
HBA1C MFR BLD: 5.8 %
HEMOCCULT STL QL: NEGATIVE
MICROALBUMIN/CREAT 24H UR: ABNORMAL MG/G{CREAT}
MICROALBUMIN/CREAT UR-RTO: ABNORMAL

## 2019-05-22 PROCEDURE — 82274 ASSAY TEST FOR BLOOD FECAL: CPT | Performed by: PHYSICIAN ASSISTANT

## 2019-05-22 PROCEDURE — G8427 DOCREV CUR MEDS BY ELIG CLIN: HCPCS | Performed by: FAMILY MEDICINE

## 2019-05-22 PROCEDURE — 2022F DILAT RTA XM EVC RTNOPTHY: CPT | Performed by: FAMILY MEDICINE

## 2019-05-22 PROCEDURE — 4004F PT TOBACCO SCREEN RCVD TLK: CPT | Performed by: FAMILY MEDICINE

## 2019-05-22 PROCEDURE — 1123F ACP DISCUSS/DSCN MKR DOCD: CPT | Performed by: FAMILY MEDICINE

## 2019-05-22 PROCEDURE — 99214 OFFICE O/P EST MOD 30 MIN: CPT | Performed by: FAMILY MEDICINE

## 2019-05-22 PROCEDURE — 3023F SPIROM DOC REV: CPT | Performed by: FAMILY MEDICINE

## 2019-05-22 PROCEDURE — 4040F PNEUMOC VAC/ADMIN/RCVD: CPT | Performed by: FAMILY MEDICINE

## 2019-05-22 PROCEDURE — G8926 SPIRO NO PERF OR DOC: HCPCS | Performed by: FAMILY MEDICINE

## 2019-05-22 PROCEDURE — 83036 HEMOGLOBIN GLYCOSYLATED A1C: CPT | Performed by: FAMILY MEDICINE

## 2019-05-22 PROCEDURE — G8417 CALC BMI ABV UP PARAM F/U: HCPCS | Performed by: FAMILY MEDICINE

## 2019-05-22 PROCEDURE — 82044 UR ALBUMIN SEMIQUANTITATIVE: CPT | Performed by: FAMILY MEDICINE

## 2019-05-22 PROCEDURE — 3017F COLORECTAL CA SCREEN DOC REV: CPT | Performed by: FAMILY MEDICINE

## 2019-05-22 PROCEDURE — 3044F HG A1C LEVEL LT 7.0%: CPT | Performed by: FAMILY MEDICINE

## 2019-05-22 RX ORDER — VARENICLINE TARTRATE 1 MG/1
1 TABLET, FILM COATED ORAL 2 TIMES DAILY
Qty: 60 TABLET | Refills: 3 | Status: SHIPPED | OUTPATIENT
Start: 2019-05-22 | End: 2020-04-22

## 2019-05-22 RX ORDER — VARENICLINE TARTRATE 25 MG
KIT ORAL
Qty: 53 TABLET | Refills: 0 | Status: SHIPPED | OUTPATIENT
Start: 2019-05-22 | End: 2020-04-22

## 2019-05-22 RX ORDER — IPRATROPIUM BROMIDE AND ALBUTEROL SULFATE 2.5; .5 MG/3ML; MG/3ML
1 SOLUTION RESPIRATORY (INHALATION) EVERY 6 HOURS PRN
Qty: 360 ML | Refills: 1 | Status: SHIPPED | OUTPATIENT
Start: 2019-05-22

## 2019-05-22 RX ORDER — GUAIFENESIN AND CODEINE PHOSPHATE 100; 10 MG/5ML; MG/5ML
5 SOLUTION ORAL EVERY 4 HOURS PRN
Qty: 236 ML | Refills: 0 | Status: SHIPPED | OUTPATIENT
Start: 2019-05-22 | End: 2019-05-28 | Stop reason: SINTOL

## 2019-05-22 ASSESSMENT — ENCOUNTER SYMPTOMS
WHEEZING: 0
SORE THROAT: 0
COUGH: 0
EYE REDNESS: 0
NAUSEA: 0
ABDOMINAL PAIN: 0
VOMITING: 0
SHORTNESS OF BREATH: 0
RHINORRHEA: 0
DIARRHEA: 0
EYE DISCHARGE: 0

## 2019-05-22 NOTE — PROGRESS NOTES
mL 1    guaiFENesin-codeine (CHERATUSSIN AC) 100-10 MG/5ML syrup Take 5 mLs by mouth every 4 hours as needed for Cough for up to 14 days. 236 mL 0    metFORMIN (GLUCOPHAGE) 1000 MG tablet Take 1 tablet by mouth daily (with breakfast) 90 tablet 3    varenicline (CHANTIX STARTING MONTH BOOKER) 0.5 MG X 11 & 1 MG X 42 tablet Take by mouth. 53 tablet 0    varenicline (CHANTIX CONTINUING MONTH BOOKER) 1 MG tablet Take 1 tablet by mouth 2 times daily 60 tablet 3    doxycycline hyclate (VIBRAMYCIN) 100 MG capsule Take 1 capsule by mouth 2 times daily for 10 days Take with food, but avoid dairy, calcium and MTV's 2 hours before and after the dose 20 capsule 0    predniSONE (DELTASONE) 20 MG tablet Take 2 tablets by mouth daily for 5 days 10 tablet 0    ibuprofen (ADVIL;MOTRIN) 800 MG tablet Take 1 tablet by mouth every 8 hours as needed for Pain 90 tablet 1    oxyCODONE-acetaminophen (PERCOCET)  MG per tablet Take 1 tablet by mouth every 6 hours as needed for Pain for up to 30 days. 120 tablet 0    budesonide-formoterol (SYMBICORT) 160-4.5 MCG/ACT AERO Inhale 2 puffs into the lungs 2 times daily 10.2 g 4    albuterol sulfate HFA (PROAIR HFA) 108 (90 Base) MCG/ACT inhaler Inhale 2 puffs into the lungs every 4 hours as needed for Shortness of Breath 1 Inhaler 5    hydrochlorothiazide (HYDRODIURIL) 25 MG tablet TAKE 1 TABLET BY MOUTH ONE TIME A DAY  30 tablet 4    atorvastatin (LIPITOR) 20 MG tablet Take 1 tablet by mouth nightly 90 tablet 3    Vitamin D, Ergocalciferol, 2000 units CAPS Take 1 capsule by mouth daily 90 capsule 3    benazepril (LOTENSIN) 20 MG tablet Take 1 tablet by mouth daily 90 tablet 3    ammonium lactate (LAC-HYDRIN) 12 % lotion Apply topically daily. 225 mL 2     No current facility-administered medications for this visit.       No Known Allergies    Health Maintenance   Topic Date Due    Diabetic retinal exam  03/08/1960    Shingles Vaccine (1 of 2) 03/08/1999    Colon Cancer Screen Abdominal: He exhibits no distension. There is no tenderness. There is no guarding. Musculoskeletal: He exhibits no edema. Lymphadenopathy:     He has no cervical adenopathy. Neurological: He is alert and oriented to person, place, and time. Diabetic foot check: Bunions: none . Hammertoes mild. Plantar calluses mild. No cyanosis or clubbing. sensation intact on 4 of 6 test points with the 10 gram filament. Dorsalis pedis pulses intact bilaterally. Capillary refill at the toes was less than 2 seconds. No skin breakdown, erythema, blisters, scaling, or ulcers. No evidence of fungal infection. Skin: Skin is warm and dry. No rash noted. Psychiatric: He has a normal mood and affect. His behavior is normal. Thought content normal.   Nursing note and vitals reviewed. Assessment:       Diagnosis Orders   1. Controlled type 2 diabetes mellitus without complication, without long-term current use of insulin ([facility])  [place] COLLECTION CAPILLARY BLOOD SPECIMEN    POCT glycosylated hemoglobin (Hb A1C)    metFORMIN (GLUCOPHAGE) 1000 MG tablet    POCT microalbumin     DIABETES FOOT EXAM   2. Chronic obstructive pulmonary disease, unspecified COPD type ([facility]  ipratropium-albuterol (DUONEB) 0.5-2.5 (3) MG/3ML SOLN nebulizer solution   3. Acute bronchitis, unspecified organism  guaiFENesin-codeine (CHERATUSSIN AC) 100-10 MG/5ML syrup   4. Tobacco abuse  varenicline (CHANTIX STARTING MONTH BOOKER) 0.5 MG X 11 & 1 MG X 42 tablet    varenicline (CHANTIX CONTINUING MONTH BOOKER) 1 MG tablet        Plan:    Cherituss for cough  Continue doxy  duoneb aerosals for cough  Chantix for smoking     Return in about 3 months (around 8/22/2019).     Orders Placed This Encounter   Procedures    POCT glycosylated hemoglobin (Hb A1C)    POCT microalbumin     DIABETES FOOT EXAM    [place] COLLECTION CAPILLARY BLOOD SPECIMEN     Orders Placed This Encounter   Medications    ipratropium-albuterol (DUONEB) 0.5-2.5 (3) MG/3ML SOLN nebulizer solution     Sig: Inhale 3 mLs into the lungs every 6 hours as needed for Shortness of Breath     Dispense:  360 mL     Refill:  1    guaiFENesin-codeine (CHERATUSSIN AC) 100-10 MG/5ML syrup     Sig: Take 5 mLs by mouth every 4 hours as needed for Cough for up to 14 days. Dispense:  236 mL     Refill:  0     Reduce doses taken as pain becomes manageable    metFORMIN (GLUCOPHAGE) 1000 MG tablet     Sig: Take 1 tablet by mouth daily (with breakfast)     Dispense:  90 tablet     Refill:  3    varenicline (CHANTIX STARTING MONTH BOOKER) 0.5 MG X 11 & 1 MG X 42 tablet     Sig: Take by mouth. Dispense:  53 tablet     Refill:  0    varenicline (CHANTIX CONTINUING MONTH BOOKER) 1 MG tablet     Sig: Take 1 tablet by mouth 2 times daily     Dispense:  60 tablet     Refill:  3       Patient given educationalmaterials - see patient instructions. Discussed use, benefit, and side effectsof prescribed medications. All patient questions answered. Pt voiced understanding. Reviewed health maintenance. Instructed to continue current medications, diet andexercise. Patient agreed with treatment plan. Follow up as directed.      Electronicallysigned by Vinicio Patino MD on 5/22/2019 at 12:36 PM

## 2019-05-28 DIAGNOSIS — J44.1 COPD EXACERBATION (HCC): ICD-10-CM

## 2019-05-28 DIAGNOSIS — J20.9 ACUTE BRONCHITIS, UNSPECIFIED ORGANISM: ICD-10-CM

## 2019-05-28 DIAGNOSIS — J44.9 CHRONIC OBSTRUCTIVE PULMONARY DISEASE, UNSPECIFIED COPD TYPE (HCC): Primary | ICD-10-CM

## 2019-06-05 ENCOUNTER — OFFICE VISIT (OUTPATIENT)
Dept: PRIMARY CARE CLINIC | Age: 69
End: 2019-06-05
Payer: COMMERCIAL

## 2019-06-05 VITALS
HEART RATE: 58 BPM | HEIGHT: 64 IN | DIASTOLIC BLOOD PRESSURE: 74 MMHG | WEIGHT: 258.2 LBS | OXYGEN SATURATION: 91 % | BODY MASS INDEX: 44.08 KG/M2 | SYSTOLIC BLOOD PRESSURE: 136 MMHG

## 2019-06-05 DIAGNOSIS — M54.31 SCIATICA OF RIGHT SIDE: Primary | ICD-10-CM

## 2019-06-05 DIAGNOSIS — M54.50 ACUTE MIDLINE LOW BACK PAIN WITHOUT SCIATICA: ICD-10-CM

## 2019-06-05 PROCEDURE — 3017F COLORECTAL CA SCREEN DOC REV: CPT | Performed by: FAMILY MEDICINE

## 2019-06-05 PROCEDURE — 96372 THER/PROPH/DIAG INJ SC/IM: CPT | Performed by: FAMILY MEDICINE

## 2019-06-05 PROCEDURE — 4004F PT TOBACCO SCREEN RCVD TLK: CPT | Performed by: FAMILY MEDICINE

## 2019-06-05 PROCEDURE — 4040F PNEUMOC VAC/ADMIN/RCVD: CPT | Performed by: FAMILY MEDICINE

## 2019-06-05 PROCEDURE — G8427 DOCREV CUR MEDS BY ELIG CLIN: HCPCS | Performed by: FAMILY MEDICINE

## 2019-06-05 PROCEDURE — 1123F ACP DISCUSS/DSCN MKR DOCD: CPT | Performed by: FAMILY MEDICINE

## 2019-06-05 PROCEDURE — 99213 OFFICE O/P EST LOW 20 MIN: CPT | Performed by: FAMILY MEDICINE

## 2019-06-05 PROCEDURE — G8417 CALC BMI ABV UP PARAM F/U: HCPCS | Performed by: FAMILY MEDICINE

## 2019-06-05 RX ORDER — OXYCODONE AND ACETAMINOPHEN 10; 325 MG/1; MG/1
1 TABLET ORAL EVERY 6 HOURS PRN
Qty: 120 TABLET | Refills: 0 | Status: SHIPPED | OUTPATIENT
Start: 2019-06-05 | End: 2019-06-26 | Stop reason: SDUPTHER

## 2019-06-05 RX ORDER — IBUPROFEN 800 MG/1
800 TABLET ORAL EVERY 8 HOURS PRN
Qty: 90 TABLET | Refills: 2 | Status: SHIPPED | OUTPATIENT
Start: 2019-06-05 | End: 2019-06-26 | Stop reason: SDUPTHER

## 2019-06-05 RX ORDER — METHYLPREDNISOLONE ACETATE 80 MG/ML
80 INJECTION, SUSPENSION INTRA-ARTICULAR; INTRALESIONAL; INTRAMUSCULAR; SOFT TISSUE ONCE
Status: COMPLETED | OUTPATIENT
Start: 2019-06-05 | End: 2019-06-05

## 2019-06-05 RX ADMIN — METHYLPREDNISOLONE ACETATE 80 MG: 80 INJECTION, SUSPENSION INTRA-ARTICULAR; INTRALESIONAL; INTRAMUSCULAR; SOFT TISSUE at 10:20

## 2019-06-05 ASSESSMENT — ENCOUNTER SYMPTOMS
COUGH: 0
ABDOMINAL PAIN: 0
RHINORRHEA: 0
VOMITING: 0
NAUSEA: 0
SHORTNESS OF BREATH: 0
EYE DISCHARGE: 0
SORE THROAT: 0
DIARRHEA: 0
WHEEZING: 0
EYE REDNESS: 0

## 2019-06-05 NOTE — PROGRESS NOTES
[address] [facility] PRIMARY CARE  [id number] [address] [facility]  Dept: 700 Yonathan Aguayo is a 78 y.o. male who presents today for his medical conditions/complaintsas noted below. Chief Complaint   Patient presents with    Other     Pain right buttocks, radiating down leg       HPI:     HPI   Pt with pain in the right buttock. Present past 4 days. Getting worse. No fever or chills. No trauma. No pain with leg movement. LDL Calculated (mg/dL)   Date Value   2018 65       (goal LDL is <100)   BUN (mg/dL)   Date Value   2017 15     BP Readings from Last 3 Encounters:   19 (!) 158/88   19 (!) 140/78   19 120/70          (goal 120/80)    Past Medical History:   Diagnosis Date    Arthritis     COPD [facility] obstructive [facility] disease) [facility]     Diabetes mellitus ([facility]     GURPREET on CPAP       Past Surgical History:   Procedure Laterality Date    APPENDECTOMY      CHOLECYSTECTOMY  25 years ago    JOINT REPLACEMENT Right 2017    LUMBAR LAMINECTOMY  10/24/2018    L2-L5 laminectomy, foraminotomy    TONSILLECTOMY      TOTAL KNEE ARTHROPLASTY Left 2017       No family history on file. Social History     Tobacco Use    Smoking status: Current Every Day Smoker     Packs/day: 1.00     Years: 42.00     Pack years: 42.00     Types: Cigarettes     Last attempt to quit: 10/12/2017     Years since quittin.6    Smokeless tobacco: Never Used   Substance Use Topics    Alcohol use: No      Current Outpatient Medications   Medication Sig Dispense Refill    oxyCODONE-acetaminophen (PERCOCET)  MG per tablet Take 1 tablet by mouth every 6 hours as needed for Pain for up to 30 days.  120 tablet 0    ibuprofen (ADVIL;MOTRIN) 800 MG tablet Take 1 tablet by mouth every 8 hours as needed for Pain 90 tablet 2    ipratropium-albuterol (DUONEB) 0.5-2.5 (3) MG/3ML SOLN nebulizer solution Inhale 3 mLs into the lungs every 6 hours as needed for Shortness of Breath 360 mL 1    metFORMIN (GLUCOPHAGE) 1000 MG tablet Take 1 tablet by mouth daily (with breakfast) 90 tablet 3    varenicline (CHANTIX STARTING MONTH PAK) 0.5 MG X 11 & 1 MG X 42 tablet Take by mouth. 53 tablet 0    varenicline (CHANTIX CONTINUING MONTH PAK) 1 MG tablet Take 1 tablet by mouth 2 times daily 60 tablet 3    budesonide-formoterol (SYMBICORT) 160-4.5 MCG/ACT AERO Inhale 2 puffs into the lungs 2 times daily 10.2 g 4    albuterol sulfate HFA (PROAIR HFA) 108 (90 Base) MCG/ACT inhaler Inhale 2 puffs into the lungs every 4 hours as needed for Shortness of Breath 1 Inhaler 5    hydrochlorothiazide (HYDRODIURIL) 25 MG tablet TAKE 1 TABLET BY MOUTH ONE TIME A DAY  30 tablet 4    atorvastatin (LIPITOR) 20 MG tablet Take 1 tablet by mouth nightly 90 tablet 3    Vitamin D, Ergocalciferol, 2000 units CAPS Take 1 capsule by mouth daily 90 capsule 3    benazepril (LOTENSIN) 20 MG tablet Take 1 tablet by mouth daily 90 tablet 3    ammonium lactate (LAC-HYDRIN) 12 % lotion Apply topically daily. 225 mL 2     No current facility-administered medications for this visit. Allergies   Allergen Reactions    Cheratussin Ac [Guaifenesin-Codeine] Other (See Comments)     Coughing Attack.         Health Maintenance   Topic Date Due    Diabetic retinal exam  03/08/1959    Shingles Vaccine (1 of 2) 03/08/1999    Lipid screen  02/01/2020    Potassium monitoring  02/01/2020    Creatinine monitoring  02/01/2020    Low dose CT lung screening  04/03/2020    Diabetic foot exam  05/22/2020    A1C test (Diabetic or Prediabetic)  05/22/2020    Diabetic microalbuminuria test  05/22/2020    Colon Cancer Screen FIT/FOBT  05/22/2020    DTaP/Tdap/Td vaccine (2 - Td) 07/01/2020    Flu vaccine  Completed    Pneumococcal 65+ years Vaccine  Completed    AAA screen  Completed    Hepatitis C screen  Completed       Subjective:      Review of Systems   Constitutional: Negative Assessment:       Diagnosis Orders   1. Sciatica of right side  methylPREDNISolone acetate (DEPO-MEDROL) injection 80 mg   2. Acute midline low back pain without sciatica  oxyCODONE-acetaminophen (PERCOCET)  MG per tablet    ibuprofen (ADVIL;MOTRIN) 800 MG tablet        Plan:    injection as above  Exercises given  If no better, order physical therapy      Return in about 3 months (around 9/5/2019). No orders of the defined types were placed in this encounter. Orders Placed This Encounter   Medications    methylPREDNISolone acetate (DEPO-MEDROL) injection 80 mg    oxyCODONE-acetaminophen (PERCOCET)  MG per tablet     Sig: Take 1 tablet by mouth every 6 hours as needed for Pain for up to 30 days. Dispense:  120 tablet     Refill:  0     Reduce doses taken as pain becomes manageable    ibuprofen (ADVIL;MOTRIN) 800 MG tablet     Sig: Take 1 tablet by mouth every 8 hours as needed for Pain     Dispense:  90 tablet     Refill:  2       Patient given educationalmaterials - see patient instructions. Discussed use, benefit, and side effectsof prescribed medications. All patient questions answered. Pt voiced understanding. Reviewed health maintenance. Instructed to continue current medications, diet andexercise. Patient agreed with treatment plan. Follow up as directed.      Electronicallysigned by Marielle Freire MD on 6/5/2019 at 10:31 AM

## 2019-06-05 NOTE — PATIENT INSTRUCTIONS
Patient Education        Piriformis Syndrome: Exercises  Your Care Instructions  Here are some examples of typical rehabilitation exercises for your condition. Start each exercise slowly. Ease off the exercise if you start to have pain. Your doctor or physical therapist will tell you when you can start these exercises and which ones will work best for you. How to do the exercises  Hip rotator stretch    1. Lie on your back with both knees bent and your feet flat on the floor. 2. Put the ankle of your affected leg on your opposite thigh near your knee. 3. Use your hand to gently push your knee (on your affected leg) away from your body until you feel a gentle stretch around your hip. 4. Hold the stretch for 15 to 30 seconds. 5. Repeat 2 to 4 times. 6. Switch legs and repeat steps 1 through 5. Piriformis stretch    1. Lie on your back with your legs straight. 2. Lift your affected leg and bend your knee. With your opposite hand, reach across your body, and then gently pull your knee toward your opposite shoulder. 3. Hold the stretch for 15 to 30 seconds. 4. Repeat with your other leg. 5. Repeat 2 to 4 times on each side. Lower abdominal strengthening    1. Lie on your back with your knees bent and your feet flat on the floor. 2. Tighten your belly muscles by pulling your belly button in toward your spine. 3. Lift one foot off the floor and bring your knee toward your chest, so that your knee is straight above your hip and your leg is bent like the letter \"L. \"  4. Lift the other knee up to the same position. 5. Lower one leg at a time to the starting position. 6. Keep alternating legs until you have lifted each leg 8 to 12 times. 7. Be sure to keep your belly muscles tight and your back still as you are moving your legs. Be sure to breathe normally. Follow-up care is a key part of your treatment and safety.  Be sure to make and go to all appointments, and call your doctor if you are having problems. It's also a good idea to know your test results and keep a list of the medicines you take. Current as of: September 20, 2018  Content Version: 12.0  © 9693-7972 [employer], Incorporated. Care instructions adapted under license by [facility] ([facility]). If you have questions about a medical condition or this instruction, always ask your healthcare professional. [address] any warranty or liability for your use of this information.

## 2019-06-06 ENCOUNTER — APPOINTMENT (OUTPATIENT)
Dept: CT IMAGING | Age: 69
End: 2019-06-06
Payer: COMMERCIAL

## 2019-06-06 ENCOUNTER — HOSPITAL ENCOUNTER (EMERGENCY)
Age: 69
Discharge: HOME | End: 2019-06-06
Attending: EMERGENCY MEDICINE
Payer: COMMERCIAL

## 2019-06-06 VITALS
DIASTOLIC BLOOD PRESSURE: 69 MMHG | HEART RATE: 89 BPM | WEIGHT: 255 LBS | RESPIRATION RATE: 16 BRPM | OXYGEN SATURATION: 95 % | BODY MASS INDEX: 40.02 KG/M2 | HEIGHT: 67 IN | TEMPERATURE: 98.2 F | SYSTOLIC BLOOD PRESSURE: 153 MMHG

## 2019-06-06 DIAGNOSIS — S32.009A CLOSED FRACTURE OF TRANSVERSE PROCESS OF LUMBAR VERTEBRA, INITIAL ENCOUNTER (HCC): Primary | ICD-10-CM

## 2019-06-06 PROCEDURE — 6360000002 HC RX W HCPCS: Performed by: EMERGENCY MEDICINE

## 2019-06-06 PROCEDURE — 72131 CT LUMBAR SPINE W/O DYE: CPT

## 2019-06-06 PROCEDURE — 96372 THER/PROPH/DIAG INJ SC/IM: CPT

## 2019-06-06 PROCEDURE — 99283 EMERGENCY DEPT VISIT LOW MDM: CPT

## 2019-06-06 RX ORDER — CYCLOBENZAPRINE HCL 10 MG
10 TABLET ORAL 3 TIMES DAILY PRN
Qty: 30 TABLET | Refills: 0 | Status: SHIPPED | OUTPATIENT
Start: 2019-06-06 | End: 2019-06-16

## 2019-06-06 RX ORDER — LORAZEPAM 2 MG/ML
1 INJECTION INTRAMUSCULAR EVERY 6 HOURS PRN
Status: DISCONTINUED | OUTPATIENT
Start: 2019-06-06 | End: 2019-06-07 | Stop reason: HOSPADM

## 2019-06-06 RX ORDER — LORAZEPAM 2 MG/ML
1 INJECTION INTRAMUSCULAR EVERY 6 HOURS PRN
Status: DISCONTINUED | OUTPATIENT
Start: 2019-06-06 | End: 2019-06-06

## 2019-06-06 RX ADMIN — LORAZEPAM 1 MG: 2 INJECTION INTRAMUSCULAR; INTRAVENOUS at 20:28

## 2019-06-06 RX ADMIN — HYDROMORPHONE HYDROCHLORIDE 1 MG: 1 INJECTION, SOLUTION INTRAMUSCULAR; INTRAVENOUS; SUBCUTANEOUS at 22:19

## 2019-06-06 ASSESSMENT — PAIN DESCRIPTION - ORIENTATION: ORIENTATION: RIGHT

## 2019-06-06 ASSESSMENT — PAIN DESCRIPTION - LOCATION: LOCATION: BACK

## 2019-06-06 ASSESSMENT — PAIN SCALES - GENERAL
PAINLEVEL_OUTOF10: 5
PAINLEVEL_OUTOF10: 5

## 2019-06-07 ASSESSMENT — ENCOUNTER SYMPTOMS
VOMITING: 0
BACK PAIN: 1
NAUSEA: 0
WHEEZING: 0
EYE PAIN: 0
SHORTNESS OF BREATH: 0
ABDOMINAL PAIN: 0
SORE THROAT: 0
DIARRHEA: 0
EYE DISCHARGE: 0
COLOR CHANGE: 0
CONSTIPATION: 0
STRIDOR: 0
COUGH: 0
EYE REDNESS: 0

## 2019-06-07 NOTE — ED PROVIDER NOTES
[facility]  [address] [address]  Phone: [phone]  Fax: [phone]        Pt Name: Yancy Love. MRN: [id number]  Birthdate 1950  Date of evaluation: 6/7/19      CHIEF COMPLAINT     Chief Complaint   Patient presents with    Back Pain    Leg Pain       HISTORY OF PRESENT ILLNESS  (Location/Symptom, Timing/Onset, Context/Setting, Quality, Duration, Modifying Factors, Severity.)    Yancy Love. is a 78 y.o. male who presents complaining of right-sided low back pain and pain that travels into the leg. He relates that he has a history of back pain and has had surgery before. But this seems different and worse. He was seen and his family doctor's office 2 days ago and started on Percocet 10/325's. But he relates they are not helping much at all. He denies any weakness. But he relates it is hard to walk due to the pain. He's had no trouble with urination or bowel habits. REVIEW OF SYSTEMS    (2-9 systems for level 4, 10 or more for level 5)     Review of Systems   Constitutional: Negative for activity change, appetite change, chills, fatigue and fever. HENT: Negative for congestion, ear pain and sore throat. Eyes: Negative for pain, discharge and redness. Respiratory: Negative for cough, shortness of breath, wheezing and stridor. Cardiovascular: Negative for chest pain. Gastrointestinal: Negative for abdominal pain, constipation, diarrhea, nausea and vomiting. Genitourinary: Negative for decreased urine volume and difficulty urinating. Musculoskeletal: Positive for back pain. Negative for arthralgias and myalgias. Skin: Negative for color change and rash. Neurological: Negative for dizziness, weakness and headaches. Psychiatric/Behavioral: Negative for behavioral problems and confusion.        PAST MEDICAL HISTORY    has a past medical history of Arthritis, COPD (chronic obstructive pulmonary disease) ([address] Diabetes mellitus ([address] and GURPREET on CPAP. SURGICAL HISTORY      has a past surgical history that includes Appendectomy; Tonsillectomy; Cholecystectomy (25 years ago); Total knee arthroplasty (Left, 02/01/2017); joint replacement (Right, 11/07/2017); and lumbar laminectomy (10/24/2018). [address]       Discharge Medication List as of 6/6/2019 10:15 PM      CONTINUE these medications which have NOT CHANGED    Details   oxyCODONE-acetaminophen (PERCOCET)  MG per tablet Take 1 tablet by mouth every 6 hours as needed for Pain for up to 30 days. , Disp-120 tablet, R-0Normal      ibuprofen (ADVIL;MOTRIN) 800 MG tablet Take 1 tablet by mouth every 8 hours as needed for Pain, Disp-90 tablet, R-2Normal      ipratropium-albuterol (DUONEB) 0.5-2.5 (3) MG/3ML SOLN nebulizer solution Inhale 3 mLs into the lungs every 6 hours as needed for Shortness of Breath, Disp-360 mL, R-1Normal      metFORMIN (GLUCOPHAGE) 1000 MG tablet Take 1 tablet by mouth daily (with breakfast), Disp-90 tablet, R-3Normal      varenicline (CHANTIX STARTING MONTH BOOKER) 0.5 MG X 11 & 1 MG X 42 tablet Take by mouth., Disp-53 tablet, R-0Normal      varenicline (CHANTIX CONTINUING MONTH PAK) 1 MG tablet Take 1 tablet by mouth 2 times daily, Disp-60 tablet, R-3Normal      budesonide-formoterol (SYMBICORT) 160-4.5 MCG/ACT AERO Inhale 2 puffs into the lungs 2 times daily, Disp-10.2 g, R-4Normal      albuterol sulfate HFA (PROAIR HFA) 108 (90 Base) MCG/ACT inhaler Inhale 2 puffs into the lungs every 4 hours as needed for Shortness of Breath, Disp-1 Inhaler, R-5Normal      hydrochlorothiazide (HYDRODIURIL) 25 MG tablet TAKE 1 TABLET BY MOUTH ONE TIME A DAY , Disp-30 tablet, R-4Normal      atorvastatin (LIPITOR) 20 MG tablet Take 1 tablet by mouth nightly, Disp-90 tablet, R-3Normal      Vitamin D, Ergocalciferol, 2000 units CAPS Take 1 capsule by mouth daily, Disp-90 capsule, R-3NO PRINT      benazepril (LOTENSIN) 20 MG tablet Take 1 tablet by mouth daily, Disp-90 not diaphoretic. Psychiatric: He has a normal mood and affect. His behavior is normal.       DIFFERENTIAL DIAGNOSIS/ MDM:     I did discuss getting some imaging here. I told him that at some point he may need an MRI but I could get a CT scan here today. We could also certainly get something for pain and spasm. He is agreeable. I have answered any questions he has at this time. DIAGNOSTIC RESULTS     EKG: All EKG's are interpreted by the Emergency Department Physician who either signs or Co-signs this chart in the [address] a cardiologist.    None    RADIOLOGY:  Non-plain film images such as CT, Ultrasound and MRI are read by the radiologist. Noratyler LindoAmbriz radiographic images are visualized and the radiologist interpretations are reviewed as follows:     Interpretation per the Radiologist below, if available at the time of this note:    Ct Lumbar Spine Wo Contrast    Result Date: 6/6/2019  EXAMINATION: CT OF THE LUMBAR SPINE WITHOUT CONTRAST  6/6/2019 TECHNIQUE: CT of the lumbar spine was performed without the administration of intravenous contrast. Multiplanar reformatted images are provided for review. Dose modulation, iterative reconstruction, and/or weight based adjustment of the mA/kV was utilized to reduce the radiation dose to as low as reasonably achievable. COMPARISON: None HISTORY: ORDERING SYSTEM PROVIDED HISTORY: Pain/trauma TECHNOLOGIST PROVIDED HISTORY: Pain/trauma Ordering Physician Provided Reason for Exam: low back pain Acuity: Acute Type of Exam: Initial FINDINGS: BONES/ALIGNMENT: There loss of the normal curvature. Anterolisthesis of L3 and L4 is noted relative to the remainder of the lumbar spine above and below these levels. There is no bleed fracture through the right L4 pedicle. This extends toward the right L3-4 facet joint. No other fractures are noted. Postoperative changes are noted involving the posterior elements between L2 and L4-5.   There is also a fracture involving the right L4 transverse process. There is a questionable linear nondisplaced fracture of the left L5 facet. DEGENERATIVE CHANGES: Multilevel degenerative changes are noted. SOFT TISSUES/RETROPERITONEUM: Calcifications along the margin of a low-density right renal lesion is noted. Exophytic cyst off the margin of the right kidney is noted on image 70. Vascular calcifications in the retroperitoneum are noted. There is no adenopathy. Canal detail is limited. Multilevel disc bulging is noted. There is a suggested right foraminal protrusion at L4-5. Multilevel foraminal narrowing is suggested. Oblique L4 pedicle fracture extending toward the adjacent L3-4 facet joint. There is also a fracture of the right L4 transverse process. There is a questionable fracture involving the anterior margin of the left L5 facet. Multilevel postop change Critical results were called by Dr. Chanell Alfaro to [address] [place] [address] on 6/6/2019 at 21:11. LABS:  No results found for this visit on 06/06/19. None    EMERGENCY DEPARTMENT COURSE:   Vitals:    Vitals:    06/06/19 1938   BP: (!) 153/69   Pulse: 89   Resp: 16   Temp: 98.2 °F (36.8 °C)   TempSrc: Oral   SpO2: 95%   Weight: 115.7 kg (255 lb)   Height: 5' 7\" (1.702 m)     -------------------------  BP: (!) 153/69, Temp: 98.2 °F (36.8 °C), Pulse: 89, Resp: 16      RE-EVALUATION:  Patient is feeling better on reevaluation after Dilaudid. We talked about imaging results. CONSULTS:  I did speak with Dr. Althea Rollins regarding the results. He relates that it is fine if the patient goes home today but he can follow-up in his office on Tuesday if he is unable to get into his own neurosurgeon's office. PROCEDURES:  None    FINAL IMPRESSION      1.  Closed fracture of transverse process of lumbar vertebra, initial encounter [facility])          DISPOSITION/PLAN   DISPOSITION Decision To Discharge 06/06/2019 10:14:19 PM      CONDITION ON DISPOSITION:   Stable    PATIENT REFERRED TO:  Reino Hatchet, MD  [address] [place] [address]  [phone]      Call for an appt Tuesday or be seen a neurosurgeon's office. DISCHARGE MEDICATIONS:  Discharge Medication List as of 6/6/2019 10:15 PM      START taking these medications    Details   cyclobenzaprine (FLEXERIL) 10 MG tablet Take 1 tablet by mouth 3 times daily as needed for Muscle spasms, Disp-30 tablet, R-0Print             (Please note that portions of this note were completed with a voicerecognition program.  Efforts were made to edit the dictations but occasionally words are mis-transcribed.)    Lozano MD, F.A.C.E.P.   Attending Emergency Medicine Physician        Jake Lino MD  06/07/19 [id number]

## 2019-06-07 NOTE — ED NOTES
Dr Dylan Brownlee via [employer] and spoke with Dr Aaliyah Valentin.       Rafa Denise, RENEE  06/06/19 [id number]

## 2019-06-07 NOTE — ED NOTES
Patient cleared for discharge per MD. Patient discharge instructions explained, Rx given and explained to patient. Patient Verbalized understanding of all instructions and all patient questions answered to their satisfaction. Patient departs from ED in stable condition.         Mellissa Collins RN  06/06/19 [id number]

## 2019-06-08 ENCOUNTER — HOSPITAL ENCOUNTER (EMERGENCY)
Age: 69
Discharge: HOME | End: 2019-06-08
Attending: EMERGENCY MEDICINE
Payer: COMMERCIAL

## 2019-06-08 VITALS
SYSTOLIC BLOOD PRESSURE: 155 MMHG | HEART RATE: 89 BPM | BODY MASS INDEX: 40.02 KG/M2 | WEIGHT: 255 LBS | DIASTOLIC BLOOD PRESSURE: 71 MMHG | TEMPERATURE: 97.9 F | OXYGEN SATURATION: 97 % | RESPIRATION RATE: 20 BRPM | HEIGHT: 67 IN

## 2019-06-08 DIAGNOSIS — S32.009A CLOSED FRACTURE OF TRANSVERSE PROCESS OF LUMBAR VERTEBRA, INITIAL ENCOUNTER (HCC): Primary | ICD-10-CM

## 2019-06-08 PROCEDURE — 99283 EMERGENCY DEPT VISIT LOW MDM: CPT

## 2019-06-08 PROCEDURE — 6360000002 HC RX W HCPCS: Performed by: EMERGENCY MEDICINE

## 2019-06-08 PROCEDURE — 96376 TX/PRO/DX INJ SAME DRUG ADON: CPT

## 2019-06-08 PROCEDURE — 6360000002 HC RX W HCPCS: Performed by: SPECIALIST

## 2019-06-08 PROCEDURE — 96374 THER/PROPH/DIAG INJ IV PUSH: CPT

## 2019-06-08 PROCEDURE — 96375 TX/PRO/DX INJ NEW DRUG ADDON: CPT

## 2019-06-08 RX ORDER — KETOROLAC TROMETHAMINE 15 MG/ML
15 INJECTION, SOLUTION INTRAMUSCULAR; INTRAVENOUS ONCE
Status: COMPLETED | OUTPATIENT
Start: 2019-06-08 | End: 2019-06-08

## 2019-06-08 RX ORDER — CYCLOBENZAPRINE HCL 10 MG
10 TABLET ORAL 3 TIMES DAILY PRN
Qty: 15 TABLET | Refills: 0 | Status: SHIPPED | OUTPATIENT
Start: 2019-06-08 | End: 2019-06-18

## 2019-06-08 RX ADMIN — KETOROLAC TROMETHAMINE 15 MG: 15 INJECTION, SOLUTION INTRAMUSCULAR; INTRAVENOUS at 07:34

## 2019-06-08 RX ADMIN — HYDROMORPHONE HYDROCHLORIDE 1 MG: 1 INJECTION, SOLUTION INTRAMUSCULAR; INTRAVENOUS; SUBCUTANEOUS at 09:15

## 2019-06-08 RX ADMIN — HYDROMORPHONE HYDROCHLORIDE 1 MG: 1 INJECTION, SOLUTION INTRAMUSCULAR; INTRAVENOUS; SUBCUTANEOUS at 06:48

## 2019-06-08 ASSESSMENT — PAIN DESCRIPTION - ORIENTATION: ORIENTATION: RIGHT;LOWER

## 2019-06-08 ASSESSMENT — PAIN DESCRIPTION - LOCATION: LOCATION: BACK

## 2019-06-08 ASSESSMENT — ENCOUNTER SYMPTOMS
DIARRHEA: 0
VOMITING: 0
BACK PAIN: 1
NAUSEA: 0
SORE THROAT: 0
ABDOMINAL PAIN: 0
SHORTNESS OF BREATH: 0

## 2019-06-08 ASSESSMENT — PAIN SCALES - GENERAL
PAINLEVEL_OUTOF10: 10
PAINLEVEL_OUTOF10: 8
PAINLEVEL_OUTOF10: 8
PAINLEVEL_OUTOF10: 10

## 2019-06-08 ASSESSMENT — PAIN DESCRIPTION - PROGRESSION
CLINICAL_PROGRESSION: GRADUALLY IMPROVING
CLINICAL_PROGRESSION: NOT CHANGED

## 2019-06-08 NOTE — ED PROVIDER NOTES
changes are noted. SOFT TISSUES/RETROPERITONEUM: Calcifications along the margin of a low-density right renal lesion is noted. Exophytic cyst off the margin of the right kidney is noted on image 70. Vascular calcifications in the retroperitoneum are noted. There is no adenopathy. Canal detail is limited. Multilevel disc bulging is noted. There is a suggested right foraminal protrusion at L4-5. Multilevel foraminal narrowing is suggested. Oblique L4 pedicle fracture extending toward the adjacent L3-4 facet joint. There is also a fracture of the right L4 transverse process. There is a questionable fracture involving the anterior margin of the left L5 facet. Multilevel postop change Critical results were called by Dr. Mahamed Solorio to [facility] on 6/6/2019 at 21:11. LABS:   No results found for this visit on 06/08/19. RECENT VITALS:  BP: (!) 155/71, Temp: 97.9 °F (36.6 °C), Pulse: 89, Resp: 20     ED Course     The patient was given the following medications:  Orders Placed This Encounter   Medications    HYDROmorphone (DILAUDID) injection 1 mg    ketorolac (TORADOL) injection 15 mg    HYDROmorphone (DILAUDID) injection 1 mg    cyclobenzaprine (FLEXERIL) 10 MG tablet     Sig: Take 1 tablet by mouth 3 times daily as needed for Muscle spasms     Dispense:  15 tablet     Refill:  0     During emergency department course, patient had saline Hep-Lock started and the was given Dilaudid 1 mg IV push twice, Toradol 15 mg IV push once after which she is feeling much better. He is ambulatory in the hallway and to the restroom. He prefers to go home. He was prescribed Flexeril 10 mg 3 times daily as needed for 15 tablets as he is running out of Flexeril. He is advised to continue ibuprofen and Percocet as needed for the pain, follow up with his neurosurgeon or orthopedic surgeon Dr. Yarelis Arguello, return if worse.     Medical Decision Making      41-year-old male patient presents to the emergency

## 2019-06-08 NOTE — ED NOTES
Pt awake, moaning and yelling out in pain. Pt c/o back pain returning. Dr. Sonya Azul notified.      Howard Perkins RN  06/08/19 0833

## 2019-06-08 NOTE — ED PROVIDER NOTES
[facility] ED  [address] [facility] [address]  Phone: [phone]  Fax: [phone]    eMERGENCY dEPARTMENT eNCOUnter          Pt Name: Kamar Herr MRN: [id number]  Birthdate 1950  Date of evaluation: 6/8/2019      CHIEF COMPLAINT       Chief Complaint   Patient presents with    Back Pain     patient to er per ems with c/o pain to lower right back. patient sts he was seen her a couple days ago for same pain. HISTORY OF PRESENT ILLNESS              Kamar Herr is a 78 y.o. male who presents with worsening right-sided sciatica pain and muscle spasms in his back. He was seen here a few days ago and diagnosed with L4 pedicle and transverse process fractures. States he also saw his PCP prior to coming to see us a few days ago. Denies any fevers. Denies any peripheral neurologic symptoms such as numbness/weakness/paresthesias other than the pain. Denies any bowel/bladder retention/incontinence. Percocet is not working very well. Was also prescribed Flexeril that does not seem to be working very well either. He does have a neurosurgeon Dr. Randy Hutson at [facility] he has seen before. Also was given Dr. Tricia Villarreal for follow-up but has not yet been able to get in to see either. Denies any trauma that caused the initial fractures. Denies any other symptoms at this time. REVIEW OF SYSTEMS         Review of Systems   Constitutional: Negative for chills and fever. HENT: Negative for sore throat. Respiratory: Negative for shortness of breath. Cardiovascular: Negative for chest pain. Gastrointestinal: Negative for abdominal pain, diarrhea, nausea and vomiting. Musculoskeletal: Positive for back pain. Negative for neck pain. Skin: Negative for rash. Neurological: Negative for weakness and numbness.           PAST MEDICAL HISTORY    has a past medical history of Arthritis, COPD (chronic obstructive pulmonary disease) ([facility] [address] Diabetes mellitus ([facility] [address] and GURPREET on CPAP. SURGICAL HISTORY      has a past surgical history that includes Appendectomy; Tonsillectomy; Cholecystectomy (25 years ago); Total knee arthroplasty (Left, 02/01/2017); joint replacement (Right, 11/07/2017); and lumbar laminectomy (10/24/2018). CURRENT MEDICATIONS       Discharge Medication List as of 6/8/2019  9:49 AM      CONTINUE these medications which have NOT CHANGED    Details   !! cyclobenzaprine (FLEXERIL) 10 MG tablet Take 1 tablet by mouth 3 times daily as needed for Muscle spasms, Disp-30 tablet, R-0Print      oxyCODONE-acetaminophen (PERCOCET)  MG per tablet Take 1 tablet by mouth every 6 hours as needed for Pain for up to 30 days. , Disp-120 tablet, R-0Normal      ibuprofen (ADVIL;MOTRIN) 800 MG tablet Take 1 tablet by mouth every 8 hours as needed for Pain, Disp-90 tablet, R-2Normal      ipratropium-albuterol (DUONEB) 0.5-2.5 (3) MG/3ML SOLN nebulizer solution Inhale 3 mLs into the lungs every 6 hours as needed for Shortness of Breath, Disp-360 mL, R-1Normal      metFORMIN (GLUCOPHAGE) 1000 MG tablet Take 1 tablet by mouth daily (with breakfast), Disp-90 tablet, R-3Normal      varenicline (CHANTIX STARTING MONTH BOOKER) 0.5 MG X 11 & 1 MG X 42 tablet Take by mouth., Disp-53 tablet, R-0Normal      varenicline (CHANTIX CONTINUING MONTH BOOKER) 1 MG tablet Take 1 tablet by mouth 2 times daily, Disp-60 tablet, R-3Normal      budesonide-formoterol (SYMBICORT) 160-4.5 MCG/ACT AERO Inhale 2 puffs into the lungs 2 times daily, Disp-10.2 g, R-4Normal      albuterol sulfate HFA (PROAIR HFA) 108 (90 Base) MCG/ACT inhaler Inhale 2 puffs into the lungs every 4 hours as needed for Shortness of Breath, Disp-1 Inhaler, R-5Normal      hydrochlorothiazide (HYDRODIURIL) 25 MG tablet TAKE 1 TABLET BY MOUTH ONE TIME A DAY , Disp-30 tablet, R-4Normal      atorvastatin (LIPITOR) 20 MG tablet Take 1 tablet by mouth nightly, Disp-90 tablet, R-3Normal      Vitamin D, Ergocalciferol, 2000 units CAPS Take 1 capsule by mouth daily, Disp-90 capsule, R-3NO PRINT      benazepril (LOTENSIN) 20 MG tablet Take 1 tablet by mouth daily, Disp-90 tablet, R-3Normal      ammonium lactate (LAC-HYDRIN) 12 % lotion Apply topically daily. , Disp-225 mL, R-2, Normal       !! - Potential duplicate medications found. Please discuss with provider. ALLERGIES     is allergic to cheratussin ac [guaifenesin-codeine]. FAMILY HISTORY     has no family status information on file. family history is not on file. SOCIAL HISTORY      reports that he has been smoking cigarettes. He has a 42.00 pack-year smoking history. He has never used smokeless tobacco. He reports that he does not drink alcohol or use drugs. PHYSICAL EXAM     INITIAL VITALS:  height is 5' 7\" (1.702 m) and weight is 115.7 kg (255 lb). His oral temperature is 97.9 °F (36.6 °C). His blood pressure is 155/71 (abnormal) and his pulse is 89. His respiration is 20 and oxygen saturation is 97%. Physical Exam   Constitutional: He is oriented to person, place, and time. He appears well-developed and well-nourished. No distress. HENT:   Head: Normocephalic and atraumatic. Right Ear: External ear normal.   Left Ear: External ear normal.   Eyes: Pupils are equal, round, and reactive to light. EOM and lids are normal. Right eye exhibits no discharge. Left eye exhibits no discharge. Neck: Full passive range of motion without pain. No spinous process tenderness and no muscular tenderness present. No tracheal deviation present. Cardiovascular: Normal rate and regular rhythm. Pulmonary/Chest: Effort normal and breath sounds normal.   Abdominal: Soft. There is no tenderness. Neurological: He is alert and oriented to person, place, and time. He has normal strength and normal reflexes. No cranial nerve deficit or sensory deficit. He exhibits normal muscle tone. Coordination normal. GCS eye subscore is 4. GCS verbal subscore is 5. GCS motor subscore is 6.    No margin of a low-density right renal lesion is noted. Exophytic cyst off the margin of the right kidney is noted on image 70. Vascular calcifications in the retroperitoneum are noted. There is no adenopathy. Canal detail is limited. Multilevel disc bulging is noted. There is a suggested right foraminal protrusion at L4-5. Multilevel foraminal narrowing is suggested. Oblique L4 pedicle fracture extending toward the adjacent L3-4 facet joint. There is also a fracture of the right L4 transverse process. There is a questionable fracture involving the anterior margin of the left L5 facet. Multilevel postop change Critical results were called by Dr. [place] to [place] [address] on 6/6/2019 at 21:11. LABS:  No results found for this visit on 06/08/19. EMERGENCY DEPARTMENT COURSE:     The patient was given the following medications:  Orders Placed This Encounter   Medications    HYDROmorphone (DILAUDID) injection 1 mg    ketorolac (TORADOL) injection 15 mg    HYDROmorphone (DILAUDID) injection 1 mg    cyclobenzaprine (FLEXERIL) 10 MG tablet     Sig: Take 1 tablet by mouth 3 times daily as needed for Muscle spasms     Dispense:  15 tablet     Refill:  0        Vitals:    Vitals:    06/08/19 0632   BP: (!) 155/71   Pulse: 89   Resp: 20   Temp: 97.9 °F (36.6 °C)   TempSrc: Oral   SpO2: 97%   Weight: 115.7 kg (255 lb)   Height: 5' 7\" (1.702 m)     -------------------------  BP: (!) 155/71, Temp: 97.9 °F (36.6 °C), Pulse: 89, Resp: 20      Re-evaluation Notes    7:07 AM:   Signed over to Dr. Russell Nolan. CONSULTS:    None    CRITICAL CARE:     None    PROCEDURES:    None    FINAL IMPRESSION      1. Closed fracture of transverse process of lumbar vertebra, initial encounter [facility])          DISPOSITION/PLAN   DISPOSITION Decision To Discharge 06/08/2019 09:47:47 AM      Condition on Disposition    Improved    PATIENT REFERRED TO:  Sd Redmond B  James Fajardo [phone]    Call in 2 days  For reevaluation of current symptoms    Homer Herring, [address] [facility]  [phone]    Call in 2 days  For reevaluation of current symptoms    [facility] ED  [address] [facility] [address] [phone]    If symptoms worsen      DISCHARGE MEDICATIONS:  Discharge Medication List as of 6/8/2019  9:49 AM      START taking these medications    Details   !! cyclobenzaprine (FLEXERIL) 10 MG tablet Take 1 tablet by mouth 3 times daily as needed for Muscle spasms, Disp-15 tablet, R-0Print       !! - Potential duplicate medications found. Please discuss with provider.           (Please note that portions of this note were completed with a voice recognition program.  Efforts were made to edit the dictations but occasionally words are mis-transcribed.)    Abdiel Rocha,   Attending Emergency Physician        Abdiel Rocha, DO  06/08/19 3242 Aquiles Diggs, DO  06/08/19 5819

## 2019-06-08 NOTE — ED NOTES
Dr. Soniya Jones at bedside discussing discharge vs admission for pain control. Pt wants to go home. Pt wife urging him to be admitted.      Coco Khan RN  06/08/19 3954

## 2019-06-13 ENCOUNTER — OFFICE VISIT (OUTPATIENT)
Dept: ORTHOPEDIC SURGERY | Age: 69
End: 2019-06-13
Payer: COMMERCIAL

## 2019-06-13 DIAGNOSIS — M43.10 ACQUIRED SPONDYLOLISTHESIS: ICD-10-CM

## 2019-06-13 DIAGNOSIS — F17.200 SMOKER: ICD-10-CM

## 2019-06-13 DIAGNOSIS — S32.049A CLOSED FRACTURE OF FOURTH LUMBAR VERTEBRA, UNSPECIFIED FRACTURE MORPHOLOGY, INITIAL ENCOUNTER (HCC): ICD-10-CM

## 2019-06-13 DIAGNOSIS — M54.16 LUMBAR RADICULAR PAIN: Primary | ICD-10-CM

## 2019-06-13 DIAGNOSIS — M96.1 POST LAMINECTOMY SYNDROME: ICD-10-CM

## 2019-06-13 DIAGNOSIS — M54.10 RADICULAR LEG PAIN: ICD-10-CM

## 2019-06-13 PROCEDURE — 1123F ACP DISCUSS/DSCN MKR DOCD: CPT | Performed by: ORTHOPAEDIC SURGERY

## 2019-06-13 PROCEDURE — 4040F PNEUMOC VAC/ADMIN/RCVD: CPT | Performed by: ORTHOPAEDIC SURGERY

## 2019-06-13 PROCEDURE — 4004F PT TOBACCO SCREEN RCVD TLK: CPT | Performed by: ORTHOPAEDIC SURGERY

## 2019-06-13 PROCEDURE — G8427 DOCREV CUR MEDS BY ELIG CLIN: HCPCS | Performed by: ORTHOPAEDIC SURGERY

## 2019-06-13 PROCEDURE — 3017F COLORECTAL CA SCREEN DOC REV: CPT | Performed by: ORTHOPAEDIC SURGERY

## 2019-06-13 PROCEDURE — 99203 OFFICE O/P NEW LOW 30 MIN: CPT | Performed by: ORTHOPAEDIC SURGERY

## 2019-06-13 PROCEDURE — G8417 CALC BMI ABV UP PARAM F/U: HCPCS | Performed by: ORTHOPAEDIC SURGERY

## 2019-06-13 RX ORDER — PREDNISONE 10 MG/1
TABLET ORAL
Qty: 15 TABLET | Refills: 0 | Status: SHIPPED | OUTPATIENT
Start: 2019-06-13 | End: 2019-06-23

## 2019-06-13 ASSESSMENT — ENCOUNTER SYMPTOMS: BACK PAIN: 1

## 2019-06-13 NOTE — PROGRESS NOTES
Subjective:      Patient ID: Coretha Runner is a 78 y.o. male. Back Pain   This is a new problem. The current episode started 1 to 4 weeks ago. The problem occurs constantly. The problem is unchanged. The pain is present in the gluteal and lumbar spine. The quality of the pain is described as aching and burning. The pain radiates to the right foot. The pain is severe. The pain is the same all the time. The symptoms are aggravated by standing, position, bending and twisting. Stiffness is present all day. Risk factors include sedentary lifestyle and obesity. He has tried analgesics for the symptoms. The treatment provided no relief. 43-year-old male BMI 44 smoker history of consistent narcotic usage since 2017    Patient presents with low back right radicular leg pain    Patient has been in the emergency department x2    Patient with history of surgery by Dr. Rita Lyon neurosurgery in 2018 reported good outcome      Review of Systems   Musculoskeletal: Positive for back pain. All other systems reviewed and are negative. Objective:   Physical Exam   Constitutional: He is oriented to person, place, and time. He appears well-developed and well-nourished. HENT:   Head: Normocephalic and atraumatic. Eyes: Conjunctivae and EOM are normal.   Neck: Normal range of motion. Pulmonary/Chest: Effort normal. No respiratory distress. Neurological: He is alert and oriented to person, place, and time. He has normal strength. No sensory deficit. Normal gait   Skin: Skin is warm and dry. Psychiatric: His behavior is normal. Thought content normal.   Nursing note and vitals reviewed. CT scan lumbar spine is reviewed patient with a right L4 pedicle fracture spondylolisthesis L4-5 retrolisthesis L2-3 and a right L4 transverse process fracture a  Assessment:      Encounter Diagnoses   Name Primary?     Lumbar radicular pain Yes    Post laminectomy syndrome     Acquired spondylolisthesis     Closed

## 2019-06-20 ENCOUNTER — HOSPITAL ENCOUNTER (OUTPATIENT)
Dept: MRI IMAGING | Age: 69
Discharge: HOME | End: 2019-06-22
Payer: COMMERCIAL

## 2019-06-20 DIAGNOSIS — S32.049A CLOSED FRACTURE OF FOURTH LUMBAR VERTEBRA, UNSPECIFIED FRACTURE MORPHOLOGY, INITIAL ENCOUNTER (HCC): ICD-10-CM

## 2019-06-20 DIAGNOSIS — M54.10 RADICULAR LEG PAIN: ICD-10-CM

## 2019-06-20 DIAGNOSIS — F17.200 SMOKER: ICD-10-CM

## 2019-06-20 DIAGNOSIS — M54.16 LUMBAR RADICULAR PAIN: ICD-10-CM

## 2019-06-20 DIAGNOSIS — M96.1 POST LAMINECTOMY SYNDROME: ICD-10-CM

## 2019-06-20 DIAGNOSIS — M43.10 ACQUIRED SPONDYLOLISTHESIS: ICD-10-CM

## 2019-06-20 PROCEDURE — 72148 MRI LUMBAR SPINE W/O DYE: CPT

## 2019-06-26 DIAGNOSIS — M54.50 ACUTE MIDLINE LOW BACK PAIN WITHOUT SCIATICA: ICD-10-CM

## 2019-06-26 RX ORDER — OXYCODONE AND ACETAMINOPHEN 10; 325 MG/1; MG/1
1 TABLET ORAL EVERY 6 HOURS PRN
Qty: 120 TABLET | Refills: 0 | Status: SHIPPED | OUTPATIENT
Start: 2019-06-26 | End: 2019-07-25 | Stop reason: SDUPTHER

## 2019-06-26 RX ORDER — IBUPROFEN 800 MG/1
800 TABLET ORAL EVERY 8 HOURS PRN
Qty: 90 TABLET | Refills: 2 | Status: SHIPPED | OUTPATIENT
Start: 2019-06-26 | End: 2019-07-25 | Stop reason: SDUPTHER

## 2019-06-26 NOTE — TELEPHONE ENCOUNTER
Patient requesting refills of his Percocet 10/325mg and Ibuprofen 800mg sent to [facility].     LRFS percocet 5/22/19, Ibuprofen 6/5/19  LOV 6/5/19

## 2019-06-27 ENCOUNTER — TELEPHONE (OUTPATIENT)
Dept: PRIMARY CARE CLINIC | Age: 69
End: 2019-06-27

## 2019-06-27 NOTE — TELEPHONE ENCOUNTER
Pharmacy notified. Patient notified and reminded that should be taking med as prescribed and cannot continue to fill early. If pain not well controlled-should f/u in office to discuss further options.

## 2019-07-24 DIAGNOSIS — M54.50 ACUTE MIDLINE LOW BACK PAIN WITHOUT SCIATICA: ICD-10-CM

## 2019-07-25 RX ORDER — OXYCODONE AND ACETAMINOPHEN 10; 325 MG/1; MG/1
1 TABLET ORAL EVERY 6 HOURS PRN
Qty: 120 TABLET | Refills: 0 | Status: SHIPPED | OUTPATIENT
Start: 2019-07-25 | End: 2019-08-27 | Stop reason: SDUPTHER

## 2019-07-25 RX ORDER — IBUPROFEN 800 MG/1
800 TABLET ORAL EVERY 8 HOURS PRN
Qty: 90 TABLET | Refills: 0 | Status: SHIPPED | OUTPATIENT
Start: 2019-07-25 | End: 2019-08-26 | Stop reason: SDUPTHER

## 2019-07-29 RX ORDER — BENAZEPRIL HYDROCHLORIDE 20 MG/1
TABLET ORAL
Qty: 30 TABLET | Refills: 2 | Status: SHIPPED | OUTPATIENT
Start: 2019-07-29 | End: 2019-10-21 | Stop reason: SDUPTHER

## 2019-08-26 DIAGNOSIS — M54.50 ACUTE MIDLINE LOW BACK PAIN WITHOUT SCIATICA: ICD-10-CM

## 2019-08-26 DIAGNOSIS — J44.9 CHRONIC OBSTRUCTIVE PULMONARY DISEASE, UNSPECIFIED COPD TYPE (HCC): ICD-10-CM

## 2019-08-27 ENCOUNTER — OFFICE VISIT (OUTPATIENT)
Dept: PRIMARY CARE CLINIC | Age: 69
End: 2019-08-27
Payer: COMMERCIAL

## 2019-08-27 VITALS
OXYGEN SATURATION: 94 % | DIASTOLIC BLOOD PRESSURE: 60 MMHG | WEIGHT: 253 LBS | HEART RATE: 90 BPM | BODY MASS INDEX: 39.71 KG/M2 | SYSTOLIC BLOOD PRESSURE: 112 MMHG | HEIGHT: 67 IN

## 2019-08-27 DIAGNOSIS — E11.9 CONTROLLED TYPE 2 DIABETES MELLITUS WITHOUT COMPLICATION, WITHOUT LONG-TERM CURRENT USE OF INSULIN (HCC): Primary | ICD-10-CM

## 2019-08-27 DIAGNOSIS — M54.50 ACUTE MIDLINE LOW BACK PAIN WITHOUT SCIATICA: ICD-10-CM

## 2019-08-27 DIAGNOSIS — Z79.899 HIGH RISK MEDICATION USE: ICD-10-CM

## 2019-08-27 LAB — HBA1C MFR BLD: 6 %

## 2019-08-27 PROCEDURE — 1123F ACP DISCUSS/DSCN MKR DOCD: CPT | Performed by: FAMILY MEDICINE

## 2019-08-27 PROCEDURE — 99213 OFFICE O/P EST LOW 20 MIN: CPT | Performed by: FAMILY MEDICINE

## 2019-08-27 PROCEDURE — 3017F COLORECTAL CA SCREEN DOC REV: CPT | Performed by: FAMILY MEDICINE

## 2019-08-27 PROCEDURE — G8427 DOCREV CUR MEDS BY ELIG CLIN: HCPCS | Performed by: FAMILY MEDICINE

## 2019-08-27 PROCEDURE — 4004F PT TOBACCO SCREEN RCVD TLK: CPT | Performed by: FAMILY MEDICINE

## 2019-08-27 PROCEDURE — 2022F DILAT RTA XM EVC RTNOPTHY: CPT | Performed by: FAMILY MEDICINE

## 2019-08-27 PROCEDURE — G8417 CALC BMI ABV UP PARAM F/U: HCPCS | Performed by: FAMILY MEDICINE

## 2019-08-27 PROCEDURE — 3044F HG A1C LEVEL LT 7.0%: CPT | Performed by: FAMILY MEDICINE

## 2019-08-27 PROCEDURE — 4040F PNEUMOC VAC/ADMIN/RCVD: CPT | Performed by: FAMILY MEDICINE

## 2019-08-27 PROCEDURE — 83036 HEMOGLOBIN GLYCOSYLATED A1C: CPT | Performed by: FAMILY MEDICINE

## 2019-08-27 RX ORDER — OXYCODONE AND ACETAMINOPHEN 10; 325 MG/1; MG/1
1 TABLET ORAL EVERY 6 HOURS PRN
Qty: 120 TABLET | Refills: 0 | Status: SHIPPED | OUTPATIENT
Start: 2019-08-27 | End: 2019-09-30 | Stop reason: SDUPTHER

## 2019-08-27 RX ORDER — CYCLOBENZAPRINE HCL 10 MG
1 TABLET ORAL EVERY 8 HOURS PRN
Refills: 0 | COMMUNITY
Start: 2019-08-16 | End: 2019-12-30

## 2019-08-27 RX ORDER — IBUPROFEN 800 MG/1
TABLET ORAL
Qty: 90 TABLET | Refills: 2 | Status: SHIPPED | OUTPATIENT
Start: 2019-08-27 | End: 2019-11-03 | Stop reason: SDUPTHER

## 2019-08-27 RX ORDER — AMMONIUM LACTATE 12 G/100G
LOTION TOPICAL
Qty: 225 ML | Refills: 2 | Status: SHIPPED | OUTPATIENT
Start: 2019-08-27 | End: 2020-09-23

## 2019-08-27 RX ORDER — HYDROCHLOROTHIAZIDE 25 MG/1
TABLET ORAL
Qty: 30 TABLET | Refills: 3 | Status: SHIPPED | OUTPATIENT
Start: 2019-08-27 | End: 2020-01-20

## 2019-08-27 RX ORDER — IBUPROFEN 800 MG/1
TABLET ORAL
Qty: 90 TABLET | Refills: 0 | Status: SHIPPED | OUTPATIENT
Start: 2019-08-27 | End: 2019-08-27

## 2019-08-27 ASSESSMENT — ENCOUNTER SYMPTOMS
ABDOMINAL PAIN: 0
COUGH: 0
EYE DISCHARGE: 0
NAUSEA: 0
WHEEZING: 0
BACK PAIN: 1
EYE REDNESS: 0
RHINORRHEA: 0
SORE THROAT: 0
VOMITING: 0
SHORTNESS OF BREATH: 0
DIARRHEA: 0

## 2019-09-04 DIAGNOSIS — Z79.899 HIGH RISK MEDICATION USE: ICD-10-CM

## 2019-09-30 DIAGNOSIS — M54.50 ACUTE MIDLINE LOW BACK PAIN WITHOUT SCIATICA: ICD-10-CM

## 2019-09-30 RX ORDER — OXYCODONE AND ACETAMINOPHEN 10; 325 MG/1; MG/1
1 TABLET ORAL EVERY 6 HOURS PRN
Qty: 120 TABLET | Refills: 0 | Status: SHIPPED | OUTPATIENT
Start: 2019-09-30 | End: 2019-11-03 | Stop reason: SDUPTHER

## 2019-10-17 ENCOUNTER — OFFICE VISIT (OUTPATIENT)
Dept: PRIMARY CARE CLINIC | Age: 69
End: 2019-10-17
Payer: COMMERCIAL

## 2019-10-17 VITALS
DIASTOLIC BLOOD PRESSURE: 74 MMHG | SYSTOLIC BLOOD PRESSURE: 118 MMHG | HEART RATE: 96 BPM | OXYGEN SATURATION: 95 % | TEMPERATURE: 98.1 F | BODY MASS INDEX: 41.29 KG/M2 | WEIGHT: 263.6 LBS

## 2019-10-17 DIAGNOSIS — J20.9 ACUTE BRONCHITIS, UNSPECIFIED ORGANISM: Primary | ICD-10-CM

## 2019-10-17 DIAGNOSIS — J44.1 COPD EXACERBATION (HCC): ICD-10-CM

## 2019-10-17 PROCEDURE — 3023F SPIROM DOC REV: CPT | Performed by: PHYSICIAN ASSISTANT

## 2019-10-17 PROCEDURE — G8926 SPIRO NO PERF OR DOC: HCPCS | Performed by: PHYSICIAN ASSISTANT

## 2019-10-17 PROCEDURE — 4040F PNEUMOC VAC/ADMIN/RCVD: CPT | Performed by: PHYSICIAN ASSISTANT

## 2019-10-17 PROCEDURE — 3017F COLORECTAL CA SCREEN DOC REV: CPT | Performed by: PHYSICIAN ASSISTANT

## 2019-10-17 PROCEDURE — 4004F PT TOBACCO SCREEN RCVD TLK: CPT | Performed by: PHYSICIAN ASSISTANT

## 2019-10-17 PROCEDURE — 1123F ACP DISCUSS/DSCN MKR DOCD: CPT | Performed by: PHYSICIAN ASSISTANT

## 2019-10-17 PROCEDURE — G8417 CALC BMI ABV UP PARAM F/U: HCPCS | Performed by: PHYSICIAN ASSISTANT

## 2019-10-17 PROCEDURE — 99213 OFFICE O/P EST LOW 20 MIN: CPT | Performed by: PHYSICIAN ASSISTANT

## 2019-10-17 PROCEDURE — G8427 DOCREV CUR MEDS BY ELIG CLIN: HCPCS | Performed by: PHYSICIAN ASSISTANT

## 2019-10-17 PROCEDURE — G8484 FLU IMMUNIZE NO ADMIN: HCPCS | Performed by: PHYSICIAN ASSISTANT

## 2019-10-17 RX ORDER — AZITHROMYCIN 250 MG/1
TABLET, FILM COATED ORAL
Qty: 1 PACKET | Refills: 0 | Status: SHIPPED | OUTPATIENT
Start: 2019-10-17 | End: 2019-10-27

## 2019-10-17 RX ORDER — GABAPENTIN 300 MG/1
CAPSULE ORAL
Refills: 0 | COMMUNITY
Start: 2019-09-27 | End: 2020-12-02

## 2019-10-17 RX ORDER — PREDNISONE 20 MG/1
40 TABLET ORAL DAILY
Qty: 10 TABLET | Refills: 0 | Status: SHIPPED | OUTPATIENT
Start: 2019-10-17 | End: 2019-10-22

## 2019-10-17 ASSESSMENT — ENCOUNTER SYMPTOMS
WHEEZING: 0
CHEST TIGHTNESS: 1
COUGH: 1
SHORTNESS OF BREATH: 1

## 2019-10-21 RX ORDER — BENAZEPRIL HYDROCHLORIDE 20 MG/1
TABLET ORAL
Qty: 30 TABLET | Refills: 1 | Status: SHIPPED | OUTPATIENT
Start: 2019-10-21 | End: 2019-12-23

## 2019-10-24 ASSESSMENT — ENCOUNTER SYMPTOMS
RHINORRHEA: 1
SINUS PRESSURE: 0
SORE THROAT: 0

## 2019-11-01 DIAGNOSIS — M54.50 ACUTE MIDLINE LOW BACK PAIN WITHOUT SCIATICA: ICD-10-CM

## 2019-11-03 RX ORDER — OXYCODONE AND ACETAMINOPHEN 10; 325 MG/1; MG/1
1 TABLET ORAL EVERY 6 HOURS PRN
Qty: 120 TABLET | Refills: 0 | Status: SHIPPED | OUTPATIENT
Start: 2019-11-03 | End: 2019-12-02 | Stop reason: SDUPTHER

## 2019-11-03 RX ORDER — IBUPROFEN 800 MG/1
TABLET ORAL
Qty: 90 TABLET | Refills: 2 | Status: SHIPPED | OUTPATIENT
Start: 2019-11-03 | End: 2019-12-02 | Stop reason: SDUPTHER

## 2019-11-20 DIAGNOSIS — E11.9 CONTROLLED TYPE 2 DIABETES MELLITUS WITHOUT COMPLICATION, WITHOUT LONG-TERM CURRENT USE OF INSULIN (HCC): ICD-10-CM

## 2019-12-02 DIAGNOSIS — M54.50 ACUTE MIDLINE LOW BACK PAIN WITHOUT SCIATICA: ICD-10-CM

## 2019-12-02 RX ORDER — OXYCODONE AND ACETAMINOPHEN 10; 325 MG/1; MG/1
1 TABLET ORAL EVERY 6 HOURS PRN
Qty: 120 TABLET | Refills: 0 | Status: SHIPPED | OUTPATIENT
Start: 2019-12-02 | End: 2020-01-08

## 2019-12-02 RX ORDER — IBUPROFEN 800 MG/1
TABLET ORAL
Qty: 90 TABLET | Refills: 2 | Status: SHIPPED | OUTPATIENT
Start: 2019-12-02 | End: 2020-04-22

## 2019-12-04 ENCOUNTER — OFFICE VISIT (OUTPATIENT)
Dept: PRIMARY CARE CLINIC | Age: 69
End: 2019-12-04
Payer: COMMERCIAL

## 2019-12-04 VITALS
HEART RATE: 97 BPM | WEIGHT: 265.4 LBS | SYSTOLIC BLOOD PRESSURE: 136 MMHG | HEIGHT: 67 IN | DIASTOLIC BLOOD PRESSURE: 68 MMHG | BODY MASS INDEX: 41.65 KG/M2 | OXYGEN SATURATION: 93 %

## 2019-12-04 DIAGNOSIS — Z00.00 ROUTINE GENERAL MEDICAL EXAMINATION AT A HEALTH CARE FACILITY: Primary | ICD-10-CM

## 2019-12-04 DIAGNOSIS — Z23 NEED FOR VIRAL IMMUNIZATION: ICD-10-CM

## 2019-12-04 DIAGNOSIS — E11.9 CONTROLLED TYPE 2 DIABETES MELLITUS WITHOUT COMPLICATION, WITHOUT LONG-TERM CURRENT USE OF INSULIN (HCC): ICD-10-CM

## 2019-12-04 LAB — HBA1C MFR BLD: 5.9 %

## 2019-12-04 PROCEDURE — G0008 ADMIN INFLUENZA VIRUS VAC: HCPCS | Performed by: FAMILY MEDICINE

## 2019-12-04 PROCEDURE — 3044F HG A1C LEVEL LT 7.0%: CPT | Performed by: FAMILY MEDICINE

## 2019-12-04 PROCEDURE — G8482 FLU IMMUNIZE ORDER/ADMIN: HCPCS | Performed by: FAMILY MEDICINE

## 2019-12-04 PROCEDURE — 90662 IIV NO PRSV INCREASED AG IM: CPT | Performed by: FAMILY MEDICINE

## 2019-12-04 PROCEDURE — G0439 PPPS, SUBSEQ VISIT: HCPCS | Performed by: FAMILY MEDICINE

## 2019-12-04 PROCEDURE — 3017F COLORECTAL CA SCREEN DOC REV: CPT | Performed by: FAMILY MEDICINE

## 2019-12-04 PROCEDURE — 83036 HEMOGLOBIN GLYCOSYLATED A1C: CPT | Performed by: FAMILY MEDICINE

## 2019-12-04 PROCEDURE — 1123F ACP DISCUSS/DSCN MKR DOCD: CPT | Performed by: FAMILY MEDICINE

## 2019-12-04 PROCEDURE — 4040F PNEUMOC VAC/ADMIN/RCVD: CPT | Performed by: FAMILY MEDICINE

## 2019-12-04 ASSESSMENT — PATIENT HEALTH QUESTIONNAIRE - PHQ9
SUM OF ALL RESPONSES TO PHQ QUESTIONS 1-9: 0
SUM OF ALL RESPONSES TO PHQ QUESTIONS 1-9: 0

## 2019-12-04 ASSESSMENT — LIFESTYLE VARIABLES: HOW OFTEN DO YOU HAVE A DRINK CONTAINING ALCOHOL: 0

## 2019-12-23 RX ORDER — BENAZEPRIL HYDROCHLORIDE 20 MG/1
TABLET ORAL
Qty: 30 TABLET | Refills: 0 | Status: SHIPPED | OUTPATIENT
Start: 2019-12-23 | End: 2019-12-24

## 2019-12-30 ENCOUNTER — OFFICE VISIT (OUTPATIENT)
Dept: PRIMARY CARE CLINIC | Age: 69
End: 2019-12-30
Payer: COMMERCIAL

## 2019-12-30 VITALS
WEIGHT: 273.2 LBS | OXYGEN SATURATION: 93 % | SYSTOLIC BLOOD PRESSURE: 128 MMHG | DIASTOLIC BLOOD PRESSURE: 70 MMHG | HEIGHT: 67 IN | TEMPERATURE: 98.3 F | BODY MASS INDEX: 42.88 KG/M2 | HEART RATE: 97 BPM

## 2019-12-30 LAB — S PYO AG THROAT QL: NORMAL

## 2019-12-30 PROCEDURE — 3017F COLORECTAL CA SCREEN DOC REV: CPT | Performed by: PHYSICIAN ASSISTANT

## 2019-12-30 PROCEDURE — G8417 CALC BMI ABV UP PARAM F/U: HCPCS | Performed by: PHYSICIAN ASSISTANT

## 2019-12-30 PROCEDURE — G8482 FLU IMMUNIZE ORDER/ADMIN: HCPCS | Performed by: PHYSICIAN ASSISTANT

## 2019-12-30 PROCEDURE — 1123F ACP DISCUSS/DSCN MKR DOCD: CPT | Performed by: PHYSICIAN ASSISTANT

## 2019-12-30 PROCEDURE — 3023F SPIROM DOC REV: CPT | Performed by: PHYSICIAN ASSISTANT

## 2019-12-30 PROCEDURE — G8926 SPIRO NO PERF OR DOC: HCPCS | Performed by: PHYSICIAN ASSISTANT

## 2019-12-30 PROCEDURE — 4004F PT TOBACCO SCREEN RCVD TLK: CPT | Performed by: PHYSICIAN ASSISTANT

## 2019-12-30 PROCEDURE — G8427 DOCREV CUR MEDS BY ELIG CLIN: HCPCS | Performed by: PHYSICIAN ASSISTANT

## 2019-12-30 PROCEDURE — 4040F PNEUMOC VAC/ADMIN/RCVD: CPT | Performed by: PHYSICIAN ASSISTANT

## 2019-12-30 PROCEDURE — 99214 OFFICE O/P EST MOD 30 MIN: CPT | Performed by: PHYSICIAN ASSISTANT

## 2019-12-30 PROCEDURE — 87880 STREP A ASSAY W/OPTIC: CPT | Performed by: PHYSICIAN ASSISTANT

## 2019-12-30 RX ORDER — PREDNISONE 20 MG/1
40 TABLET ORAL DAILY
Qty: 10 TABLET | Refills: 0 | Status: SHIPPED | OUTPATIENT
Start: 2019-12-30 | End: 2020-01-04

## 2019-12-30 RX ORDER — FUROSEMIDE 40 MG/1
40 TABLET ORAL DAILY
Qty: 5 TABLET | Refills: 0 | Status: SHIPPED | OUTPATIENT
Start: 2019-12-30 | End: 2020-01-08

## 2019-12-30 RX ORDER — DOXYCYCLINE HYCLATE 100 MG/1
100 CAPSULE ORAL 2 TIMES DAILY
Qty: 20 CAPSULE | Refills: 0 | Status: SHIPPED | OUTPATIENT
Start: 2019-12-30 | End: 2020-01-09

## 2019-12-30 NOTE — PROGRESS NOTES
[address] [facility]  [address] [address]  Dept: [address] [facility] Olivier is a 78 y.o. male who presents today for his medical conditions/complaintsas noted below. Chief Complaint   Patient presents with    Pharyngitis     Patient states that his throat feels like it is swelling shut x 2 weeks.  Leg Swelling     Patient also states that his legs and feet have began to swell since getting sick       HPI:     HPI   Sore throat for 2 weeks. Dry. Uses biotene for dry mouth. Denies PND. Denies reflux. Especially bad at night. Cough drops help. Pain with swallowing, but improves with sips of water. No congestion or runny nose. No fever. Has chronic smoker's cough. Sputum is usually clear, but now has slight brown in it. Had SOB when he lies down, improves when he sits up. Wears CPAP. Wheezing more than normal. Using proair every 3-4 hours (usaully only uses it bid). Hasn't used nebulizer lately. Using symbicort, but does not wash mouth out after. Weight is up 8 lbs since 12/4/19. Just noticed edema 3-4 days ago in both feet. It is better in the morning. No hx of blood clots. No CP. No calf pain. Pt denies hx of heart failure. May be eating more salt with the holidays. Does have some compression socks, but doesn't wear them. Hit left shin on  on Saturday.      LDL Calculated (mg/dL)   Date Value   01/12/2018 65       (goal LDL is <100)   AST (U/L)   Date Value   01/03/2020 17     ALT (U/L)   Date Value   01/03/2020 22     BUN (mg/dL)   Date Value   01/03/2020 29 (H)     BP Readings from Last 3 Encounters:   12/30/19 128/70   12/04/19 136/68   10/17/19 118/74          (goal 120/80)    Past Medical History:   Diagnosis Date    Arthritis     COPD (chronic obstructive pulmonary disease) ([facility] [address]     Diabetes mellitus ([facility] [address]     GURPREET on CPAP       Past Surgical History:   Procedure Laterality Date    mouth 2 times daily 60 tablet 3    budesonide-formoterol (SYMBICORT) 160-4.5 MCG/ACT AERO Inhale 2 puffs into the lungs 2 times daily 10.2 g 4    atorvastatin (LIPITOR) 20 MG tablet Take 1 tablet by mouth nightly 90 tablet 3    Vitamin D, Ergocalciferol, 2000 units CAPS Take 1 capsule by mouth daily 90 capsule 3     No current facility-administered medications for this visit. Allergies   Allergen Reactions    Cheratussin Ac [Guaifenesin-Codeine] Other (See Comments)     Coughing Attack. Health Maintenance   Topic Date Due    Diabetic retinal exam  03/08/1960    Shingles Vaccine (1 of 2) 03/08/1999    Low dose CT lung screening  04/03/2020    Diabetic foot exam  05/22/2020    Diabetic microalbuminuria test  05/22/2020    Colon Cancer Screen FIT/FOBT  05/22/2020    DTaP/Tdap/Td vaccine (2 - Td) 07/01/2020    Lipid screen  09/11/2020    A1C test (Diabetic or Prediabetic)  12/04/2020    Potassium monitoring  01/03/2021    Creatinine monitoring  01/03/2021    Flu vaccine  Completed    Pneumococcal 65+ years Vaccine  Completed    AAA screen  Completed    Hepatitis C screen  Completed       Subjective:      Review of Systems   Constitutional: Negative for fever. HENT: Positive for sore throat. Negative for congestion, postnasal drip and rhinorrhea. Respiratory: Positive for cough, shortness of breath and wheezing. Cardiovascular: Positive for leg swelling. Negative for chest pain. Objective:     /70   Pulse 97   Temp 98.3 °F (36.8 °C)   Ht 5' 6.96\" (1.701 m)   Wt 273 lb 3.2 oz (123.9 kg)   SpO2 93%   BMI 42.84 kg/m²   Physical Exam  Vitals signs and nursing note reviewed. Constitutional:       Appearance: Normal appearance. HENT:      Head: Normocephalic and atraumatic.       Right Ear: Tympanic membrane, ear canal and external ear normal.      Left Ear: Tympanic membrane, ear canal and external ear normal.      Mouth/Throat:      Pharynx: Posterior oropharyngeal

## 2020-01-03 ENCOUNTER — HOSPITAL ENCOUNTER (OUTPATIENT)
Age: 70
Setting detail: SPECIMEN
Discharge: HOME | End: 2020-01-03
Payer: COMMERCIAL

## 2020-01-03 LAB
ALBUMIN SERPL-MCNC: 4.1 G/DL (ref 3.5–5.2)
ALBUMIN/GLOBULIN RATIO: 1.4 (ref 1–2.5)
ALP BLD-CCNC: 61 U/L (ref 40–129)
ALT SERPL-CCNC: 22 U/L (ref 5–41)
ANION GAP SERPL CALCULATED.3IONS-SCNC: 12 MMOL/L (ref 9–17)
AST SERPL-CCNC: 17 U/L
BILIRUB SERPL-MCNC: 0.25 MG/DL (ref 0.3–1.2)
BNP INTERPRETATION: NORMAL
BUN BLDV-MCNC: 29 MG/DL (ref 8–23)
BUN/CREAT BLD: ABNORMAL (ref 9–20)
CALCIUM SERPL-MCNC: 9.3 MG/DL (ref 8.6–10.4)
CHLORIDE BLD-SCNC: 98 MMOL/L (ref 98–107)
CO2: 28 MMOL/L (ref 20–31)
CREAT SERPL-MCNC: 0.74 MG/DL (ref 0.7–1.2)
GFR AFRICAN AMERICAN: >60 ML/MIN
GFR NON-AFRICAN AMERICAN: >60 ML/MIN
GFR SERPL CREATININE-BSD FRML MDRD: ABNORMAL ML/MIN/{1.73_M2}
GFR SERPL CREATININE-BSD FRML MDRD: ABNORMAL ML/MIN/{1.73_M2}
GLUCOSE BLD-MCNC: 110 MG/DL (ref 70–99)
POTASSIUM SERPL-SCNC: 4.4 MMOL/L (ref 3.7–5.3)
PRO-BNP: 48 PG/ML
SODIUM BLD-SCNC: 138 MMOL/L (ref 135–144)
TOTAL PROTEIN: 7 G/DL (ref 6.4–8.3)
TSH SERPL DL<=0.05 MIU/L-ACNC: 1.78 MIU/L (ref 0.3–5)

## 2020-01-05 ASSESSMENT — ENCOUNTER SYMPTOMS
SORE THROAT: 1
RHINORRHEA: 0
WHEEZING: 1
COUGH: 1
SHORTNESS OF BREATH: 1

## 2020-01-08 ENCOUNTER — OFFICE VISIT (OUTPATIENT)
Dept: PRIMARY CARE CLINIC | Age: 70
End: 2020-01-08
Payer: COMMERCIAL

## 2020-01-08 VITALS
BODY MASS INDEX: 42.91 KG/M2 | DIASTOLIC BLOOD PRESSURE: 60 MMHG | WEIGHT: 273.4 LBS | HEART RATE: 94 BPM | SYSTOLIC BLOOD PRESSURE: 118 MMHG | HEIGHT: 67 IN | OXYGEN SATURATION: 93 %

## 2020-01-08 PROCEDURE — G8417 CALC BMI ABV UP PARAM F/U: HCPCS | Performed by: FAMILY MEDICINE

## 2020-01-08 PROCEDURE — 1123F ACP DISCUSS/DSCN MKR DOCD: CPT | Performed by: FAMILY MEDICINE

## 2020-01-08 PROCEDURE — G8482 FLU IMMUNIZE ORDER/ADMIN: HCPCS | Performed by: FAMILY MEDICINE

## 2020-01-08 PROCEDURE — 4040F PNEUMOC VAC/ADMIN/RCVD: CPT | Performed by: FAMILY MEDICINE

## 2020-01-08 PROCEDURE — 99213 OFFICE O/P EST LOW 20 MIN: CPT | Performed by: FAMILY MEDICINE

## 2020-01-08 PROCEDURE — 4004F PT TOBACCO SCREEN RCVD TLK: CPT | Performed by: FAMILY MEDICINE

## 2020-01-08 PROCEDURE — 3017F COLORECTAL CA SCREEN DOC REV: CPT | Performed by: FAMILY MEDICINE

## 2020-01-08 PROCEDURE — G8427 DOCREV CUR MEDS BY ELIG CLIN: HCPCS | Performed by: FAMILY MEDICINE

## 2020-01-08 RX ORDER — OXYCODONE AND ACETAMINOPHEN 10; 325 MG/1; MG/1
1 TABLET ORAL EVERY 6 HOURS PRN
Qty: 120 TABLET | Refills: 0 | Status: SHIPPED | OUTPATIENT
Start: 2020-01-08 | End: 2020-02-11 | Stop reason: SDUPTHER

## 2020-01-08 RX ORDER — TRAZODONE HYDROCHLORIDE 50 MG/1
50 TABLET ORAL NIGHTLY PRN
Qty: 90 TABLET | Refills: 3 | Status: SHIPPED | OUTPATIENT
Start: 2020-01-08 | End: 2021-01-18

## 2020-01-08 RX ORDER — FUROSEMIDE 20 MG/1
20 TABLET ORAL DAILY PRN
Qty: 90 TABLET | Refills: 1 | Status: SHIPPED | OUTPATIENT
Start: 2020-01-08 | End: 2020-06-10

## 2020-01-08 ASSESSMENT — PATIENT HEALTH QUESTIONNAIRE - PHQ9
SUM OF ALL RESPONSES TO PHQ QUESTIONS 1-9: 0
1. LITTLE INTEREST OR PLEASURE IN DOING THINGS: 0
SUM OF ALL RESPONSES TO PHQ9 QUESTIONS 1 & 2: 0
2. FEELING DOWN, DEPRESSED OR HOPELESS: 0
SUM OF ALL RESPONSES TO PHQ QUESTIONS 1-9: 0

## 2020-01-08 ASSESSMENT — ENCOUNTER SYMPTOMS
DIARRHEA: 0
EYE REDNESS: 0
VOMITING: 0
COUGH: 0
RHINORRHEA: 0
BACK PAIN: 1
WHEEZING: 0
ABDOMINAL PAIN: 0
SORE THROAT: 0
SHORTNESS OF BREATH: 0
NAUSEA: 0
EYE DISCHARGE: 0

## 2020-01-08 NOTE — PROGRESS NOTES
puffs into the lungs 2 times daily 10.2 g 4    atorvastatin (LIPITOR) 20 MG tablet Take 1 tablet by mouth nightly 90 tablet 3    Vitamin D, Ergocalciferol, 2000 units CAPS Take 1 capsule by mouth daily 90 capsule 3     No current facility-administered medications for this visit. Allergies   Allergen Reactions    Cheratussin Ac [Guaifenesin-Codeine] Other (See Comments)     Coughing Attack. Health Maintenance   Topic Date Due    Diabetic retinal exam  03/08/1959    Shingles Vaccine (1 of 2) 03/08/1999    Low dose CT lung screening  04/03/2020    Diabetic foot exam  05/22/2020    Diabetic microalbuminuria test  05/22/2020    Colon Cancer Screen FIT/FOBT  05/22/2020    DTaP/Tdap/Td vaccine (2 - Td) 07/01/2020    Lipid screen  09/11/2020    A1C test (Diabetic or Prediabetic)  12/04/2020    Potassium monitoring  01/03/2021    Creatinine monitoring  01/03/2021    Flu vaccine  Completed    Pneumococcal 65+ years Vaccine  Completed    AAA screen  Completed    Hepatitis C screen  Completed       Subjective:      Review of Systems   Constitutional: Negative for chills and fever. HENT: Negative for rhinorrhea and sore throat. Eyes: Negative for discharge and redness. Respiratory: Negative for cough, shortness of breath and wheezing. Cardiovascular: Positive for leg swelling. Negative for chest pain and palpitations. Gastrointestinal: Negative for abdominal pain, diarrhea, nausea and vomiting. Genitourinary: Negative for dysuria and frequency. Musculoskeletal: Positive for back pain. Negative for arthralgias and myalgias. Neurological: Negative for dizziness, light-headedness and headaches. Psychiatric/Behavioral: Negative for sleep disturbance. Objective:     /60   Pulse 94   Ht 5' 6.96\" (1.701 m)   Wt 273 lb 6.4 oz (124 kg)   SpO2 93%   BMI 42.87 kg/m²   Physical Exam  Vitals signs and nursing note reviewed.    Constitutional:       General: He is not in acute distress. Appearance: He is well-developed. HENT:      Head: Normocephalic and atraumatic. Eyes:      General: No scleral icterus. Right eye: No discharge. Left eye: No discharge. Conjunctiva/sclera: Conjunctivae normal.      Pupils: Pupils are equal, round, and reactive to light. Neck:      Thyroid: No thyromegaly. Trachea: No tracheal deviation. Cardiovascular:      Rate and Rhythm: Normal rate and regular rhythm. Heart sounds: Normal heart sounds. Comments: No carotid bruits  Pulmonary:      Effort: Pulmonary effort is normal. No respiratory distress. Breath sounds: Normal breath sounds. No wheezing. Musculoskeletal:      Right lower leg: Edema present. Left lower leg: Edema present. Comments: Mild 1+ bilateral lower extremity edema without erythema or calor   Lymphadenopathy:      Cervical: No cervical adenopathy. Skin:     General: Skin is warm. Findings: No rash. Neurological:      Mental Status: He is alert and oriented to person, place, and time. Psychiatric:         Behavior: Behavior normal.         Thought Content: Thought content normal.         Assessment:       Diagnosis Orders   1. Primary insomnia  traZODone (DESYREL) 50 MG tablet   2. Peripheral edema  furosemide (LASIX) 20 MG tablet   3. Acute midline low back pain without sciatica  oxyCODONE-acetaminophen (PERCOCET)  MG per tablet        Plan:    Trial of trazodone 50 mg at night for insomnia. Change Lasix to 20 mg daily as needed for swelling. Refill of Percocet. Continue other medications as is. Return in about 3 months (around 4/8/2020). No orders of the defined types were placed in this encounter.     Orders Placed This Encounter   Medications    furosemide (LASIX) 20 MG tablet     Sig: Take 1 tablet by mouth daily as needed (swelling)     Dispense:  90 tablet     Refill:  1    oxyCODONE-acetaminophen (PERCOCET)  MG per tablet     Sig: Take 1 tablet by mouth every 6 hours as needed for Pain for up to 30 days. Dispense:  120 tablet     Refill:  0     Reduce doses taken as pain becomes manageable    traZODone (DESYREL) 50 MG tablet     Sig: Take 1 tablet by mouth nightly as needed for Sleep     Dispense:  90 tablet     Refill:  3       Patient given educationalmaterials - see patient instructions. Discussed use, benefit, and side effectsof prescribed medications. All patient questions answered. Pt voiced understanding. Reviewed health maintenance. Instructed to continue current medications, diet andexercise. Patient agreed with treatment plan. Follow up as directed.      Electronicallysigned by Dave Armijo MD on 1/8/2020 at 5:20 PM

## 2020-01-20 RX ORDER — BUDESONIDE AND FORMOTEROL FUMARATE DIHYDRATE 160; 4.5 UG/1; UG/1
AEROSOL RESPIRATORY (INHALATION)
Qty: 10.2 G | Refills: 3 | Status: SHIPPED | OUTPATIENT
Start: 2020-01-20 | End: 2020-06-10 | Stop reason: SDUPTHER

## 2020-01-20 RX ORDER — HYDROCHLOROTHIAZIDE 25 MG/1
TABLET ORAL
Qty: 30 TABLET | Refills: 2 | Status: SHIPPED | OUTPATIENT
Start: 2020-01-20 | End: 2020-04-20

## 2020-02-11 RX ORDER — OXYCODONE AND ACETAMINOPHEN 10; 325 MG/1; MG/1
1 TABLET ORAL EVERY 6 HOURS PRN
Qty: 120 TABLET | Refills: 0 | Status: SHIPPED | OUTPATIENT
Start: 2020-02-11 | End: 2020-03-13 | Stop reason: SDUPTHER

## 2020-02-24 RX ORDER — ATORVASTATIN CALCIUM 20 MG/1
20 TABLET, FILM COATED ORAL NIGHTLY
Qty: 90 TABLET | Refills: 0 | Status: SHIPPED | OUTPATIENT
Start: 2020-02-24 | End: 2020-06-17

## 2020-03-04 ENCOUNTER — OFFICE VISIT (OUTPATIENT)
Dept: PRIMARY CARE CLINIC | Age: 70
End: 2020-03-04
Payer: COMMERCIAL

## 2020-03-04 VITALS
HEART RATE: 88 BPM | SYSTOLIC BLOOD PRESSURE: 134 MMHG | BODY MASS INDEX: 43.32 KG/M2 | DIASTOLIC BLOOD PRESSURE: 68 MMHG | HEIGHT: 67 IN | WEIGHT: 276 LBS | OXYGEN SATURATION: 94 %

## 2020-03-04 LAB — HBA1C MFR BLD: 5.9 %

## 2020-03-04 PROCEDURE — 4040F PNEUMOC VAC/ADMIN/RCVD: CPT | Performed by: FAMILY MEDICINE

## 2020-03-04 PROCEDURE — 3017F COLORECTAL CA SCREEN DOC REV: CPT | Performed by: FAMILY MEDICINE

## 2020-03-04 PROCEDURE — 1123F ACP DISCUSS/DSCN MKR DOCD: CPT | Performed by: FAMILY MEDICINE

## 2020-03-04 PROCEDURE — 3044F HG A1C LEVEL LT 7.0%: CPT | Performed by: FAMILY MEDICINE

## 2020-03-04 PROCEDURE — 2022F DILAT RTA XM EVC RTNOPTHY: CPT | Performed by: FAMILY MEDICINE

## 2020-03-04 PROCEDURE — 4004F PT TOBACCO SCREEN RCVD TLK: CPT | Performed by: FAMILY MEDICINE

## 2020-03-04 PROCEDURE — G8926 SPIRO NO PERF OR DOC: HCPCS | Performed by: FAMILY MEDICINE

## 2020-03-04 PROCEDURE — 99213 OFFICE O/P EST LOW 20 MIN: CPT | Performed by: FAMILY MEDICINE

## 2020-03-04 PROCEDURE — 83036 HEMOGLOBIN GLYCOSYLATED A1C: CPT | Performed by: FAMILY MEDICINE

## 2020-03-04 PROCEDURE — G8427 DOCREV CUR MEDS BY ELIG CLIN: HCPCS | Performed by: FAMILY MEDICINE

## 2020-03-04 PROCEDURE — G8417 CALC BMI ABV UP PARAM F/U: HCPCS | Performed by: FAMILY MEDICINE

## 2020-03-04 PROCEDURE — 3023F SPIROM DOC REV: CPT | Performed by: FAMILY MEDICINE

## 2020-03-04 PROCEDURE — G8482 FLU IMMUNIZE ORDER/ADMIN: HCPCS | Performed by: FAMILY MEDICINE

## 2020-03-04 ASSESSMENT — ENCOUNTER SYMPTOMS
EYE DISCHARGE: 0
NAUSEA: 0
BACK PAIN: 1
ABDOMINAL PAIN: 0
EYE REDNESS: 0
SHORTNESS OF BREATH: 0
RHINORRHEA: 0
SORE THROAT: 0
VOMITING: 0
WHEEZING: 0
DIARRHEA: 0
COUGH: 0

## 2020-03-04 NOTE — PROGRESS NOTES
[facility] PRIMARY CARE  [address] [address]  Dept: 700 Yonathan Aguayo is a 78 y.o. male who presents today for his medical conditions/complaintsas noted below. Chief Complaint   Patient presents with    Diabetes       HPI:     HPI  Patient here for diabetes recheck. States not checking his blood sugars on a regular basis. Denies any fevers or chills. No low blood sugar reactions. Patient has appointment for radiofrequency ablation to be done by pain management. They have been prescribing his Neurontin better not wanting to prescribe his Percocet. Patient states was noted at the [place] to have blood in the urine. States is scheduled for hysteroscopy at some point in the future. Patient states his breathing is about the same. Still gets somewhat short of breath. Patient is still smoking. Understands it this will worsen his emphysema. States his inhalers have been helping. LDL Calculated (mg/dL)   Date Value   01/12/2018 65       (goal LDL is <100)   AST (U/L)   Date Value   01/03/2020 17     ALT (U/L)   Date Value   01/03/2020 22     BUN (mg/dL)   Date Value   01/03/2020 29 (H)     BP Readings from Last 3 Encounters:   03/04/20 134/68   01/08/20 118/60   12/30/19 128/70          (goal 120/80)    Past Medical History:   Diagnosis Date    Arthritis     [facility]     Diabetes mellitus ([facility]     GURPREET on CPAP       Past Surgical History:   Procedure Laterality Date    APPENDECTOMY      CHOLECYSTECTOMY  25 years ago    JOINT REPLACEMENT Right 11/07/2017    LUMBAR LAMINECTOMY  10/24/2018    L2-L5 laminectomy, foraminotomy    TONSILLECTOMY      TOTAL KNEE ARTHROPLASTY Left 02/01/2017       No family history on file.     Social History     Tobacco Use    Smoking status: Current Every Day Smoker     Packs/day: 1.00     Years: 42.00     Pack years: 42.00     Types: Cigarettes     Last attempt to quit: 10/12/2017     Years since quittin.3    Smokeless tobacco: Never Used   Substance Use Topics    Alcohol use: No      Current Outpatient Medications   Medication Sig Dispense Refill    atorvastatin (LIPITOR) 20 MG tablet TAKE 1 TABLET BY MOUTH NIGHTLY 90 tablet 0    oxyCODONE-acetaminophen (PERCOCET)  MG per tablet Take 1 tablet by mouth every 6 hours as needed for Pain for up to 30 days. 120 tablet 0    SYMBICORT 160-4.5 MCG/ACT AERO INHALE 2 PUFFS BY MOUTH TWO TIMES A DAY  10.2 g 3    hydrochlorothiazide (HYDRODIURIL) 25 MG tablet TAKE 1 TABLET BY MOUTH ONE TIME A DAY  30 tablet 2    furosemide (LASIX) 20 MG tablet Take 1 tablet by mouth daily as needed (swelling) 90 tablet 1    traZODone (DESYREL) 50 MG tablet Take 1 tablet by mouth nightly as needed for Sleep 90 tablet 3    benazepril (LOTENSIN) 20 MG tablet TAKE 1 TABLET BY MOUTH ONE TIME A DAY  90 tablet 3    ibuprofen (ADVIL;MOTRIN) 800 MG tablet TAKE 1 TABLET BY MOUTH EVERY EIGHT HOURS AS NEEDED FOR PAIN 90 tablet 2    PROAIR  (90 Base) MCG/ACT inhaler INHALE 2 PUFFS BY MOUTH EVERY FOUR HOURS AS NEEDED FOR SHORTNESS OF BREATH 8.5 g 4    gabapentin (NEURONTIN) 300 MG capsule TAKE 1 CAPSULE BY MOUTH THREE TIMES A DAY  0    ammonium lactate (LAC-HYDRIN) 12 % lotion Apply topically daily. 225 mL 2    ipratropium-albuterol (DUONEB) 0.5-2.5 (3) MG/3ML SOLN nebulizer solution Inhale 3 mLs into the lungs every 6 hours as needed for Shortness of Breath 360 mL 1    metFORMIN (GLUCOPHAGE) 1000 MG tablet Take 1 tablet by mouth daily (with breakfast) 90 tablet 3    varenicline (CHANTIX STARTING MONTH BOOKER) 0.5 MG X 11 & 1 MG X 42 tablet Take by mouth. 53 tablet 0    varenicline (CHANTIX CONTINUING MONTH ) 1 MG tablet Take 1 tablet by mouth 2 times daily 60 tablet 3    Vitamin D, Ergocalciferol, 2000 units CAPS Take 1 capsule by mouth daily 90 capsule 3     No current facility-administered medications for this visit. Allergies   Allergen Reactions    Cheratussin Ac [Guaifenesin-Codeine] Other (See Comments)     Coughing Attack. Health Maintenance   Topic Date Due    Diabetic retinal exam  03/08/1960    Hepatitis B vaccine (1 of 3 - Risk 3-dose series) 03/08/1968    Shingles Vaccine (1 of 2) 03/08/1999    Low dose CT lung screening  04/03/2020    Diabetic foot exam  05/22/2020    Diabetic microalbuminuria test  05/22/2020    Colon Cancer Screen FIT/FOBT  05/22/2020    DTaP/Tdap/Td vaccine (2 - Td) 07/01/2020    Lipid screen  09/11/2020    A1C test (Diabetic or Prediabetic)  12/04/2020    Potassium monitoring  01/03/2021    Creatinine monitoring  01/03/2021    Flu vaccine  Completed    Pneumococcal 65+ years Vaccine  Completed    AAA screen  Completed    Hepatitis C screen  Completed    Hepatitis A vaccine  Aged Out    Hib vaccine  Aged Out    Meningococcal (ACWY) vaccine  Aged Out       Subjective:      Review of Systems   Constitutional: Negative for chills and fever. HENT: Negative for rhinorrhea and sore throat. Eyes: Negative for discharge and redness. Respiratory: Negative for cough, shortness of breath and wheezing. Cardiovascular: Negative for chest pain and palpitations. Gastrointestinal: Negative for abdominal pain, diarrhea, nausea and vomiting. Genitourinary: Negative for dysuria and frequency. Musculoskeletal: Positive for back pain. Negative for arthralgias and myalgias. Neurological: Negative for dizziness, light-headedness and headaches. Psychiatric/Behavioral: Negative for sleep disturbance. Objective:     /68   Pulse 88   Ht 5' 6.96\" (1.701 m)   Wt 276 lb (125.2 kg)   SpO2 94%   BMI 43.28 kg/m²   Physical Exam  Vitals signs and nursing note reviewed. Constitutional:       General: He is not in acute distress. Appearance: He is well-developed. HENT:      Head: Normocephalic and atraumatic. Eyes:      General: No scleral icterus. Right eye: No discharge. Left eye: No discharge. Conjunctiva/sclera: Conjunctivae normal.      Pupils: Pupils are equal, round, and reactive to light. Neck:      Thyroid: No thyromegaly. Trachea: No tracheal deviation. Cardiovascular:      Rate and Rhythm: Normal rate and regular rhythm. Heart sounds: Normal heart sounds. Comments: No carotid bruits  Pulmonary:      Effort: Pulmonary effort is normal. No respiratory distress. Breath sounds: Normal breath sounds. No wheezing. Lymphadenopathy:      Cervical: No cervical adenopathy. Skin:     General: Skin is warm. Findings: No rash. Neurological:      Mental Status: He is alert and oriented to person, place, and time. Psychiatric:         Behavior: Behavior normal.         Thought Content: Thought content normal.       Controlled Substance Monitoring:    Acute and Chronic Pain Monitoring:   RX Monitoring 3/4/2020   Attestation -   Acute Pain Prescriptions -   Periodic Controlled Substance Monitoring Possible medication side effects, risk of tolerance/dependence & alternative treatments discussed. ;No signs of potential drug abuse or diversion identified. Chronic Pain > 50 MEDD Obtained or confirmed \"Consent for Opioid Use\" on file. Chronic Pain > 80 MEDD -   Chronic Pain > 120 MEDD Obtained or confirmed \"Medication Contract\" on file. Assessment:       Diagnosis Orders   1. Controlled type 2 diabetes mellitus without complication, without long-term current use of insulin (HCC)  NE COLLECTION CAPILLARY BLOOD SPECIMEN    POCT glycosylated hemoglobin (Hb A1C)   2. COPD exacerbation (HCC)     3. Obesity, morbid, BMI 40.0-49.9 ([facility]          Plan:    Await pain management for radiofrequency ablation. Hopefully can start weaning down on Percocet at that time. [facility]. Continue present medications as is. Return in about 3 months (around 6/4/2020).     Orders Placed This Encounter

## 2020-03-13 RX ORDER — OXYCODONE AND ACETAMINOPHEN 10; 325 MG/1; MG/1
1 TABLET ORAL EVERY 6 HOURS PRN
Qty: 120 TABLET | Refills: 0 | Status: SHIPPED | OUTPATIENT
Start: 2020-03-13 | End: 2020-04-13 | Stop reason: SDUPTHER

## 2020-03-23 ENCOUNTER — OFFICE VISIT (OUTPATIENT)
Dept: PRIMARY CARE CLINIC | Age: 70
End: 2020-03-23
Payer: COMMERCIAL

## 2020-03-23 VITALS
WEIGHT: 263 LBS | HEIGHT: 67 IN | RESPIRATION RATE: 20 BRPM | DIASTOLIC BLOOD PRESSURE: 68 MMHG | BODY MASS INDEX: 41.28 KG/M2 | HEART RATE: 95 BPM | TEMPERATURE: 98.9 F | SYSTOLIC BLOOD PRESSURE: 113 MMHG | OXYGEN SATURATION: 95 %

## 2020-03-23 LAB
INFLUENZA A ANTIBODY: NEGATIVE
INFLUENZA B ANTIBODY: NEGATIVE
S PYO AG THROAT QL: NORMAL

## 2020-03-23 PROCEDURE — 1123F ACP DISCUSS/DSCN MKR DOCD: CPT | Performed by: NURSE PRACTITIONER

## 2020-03-23 PROCEDURE — 3017F COLORECTAL CA SCREEN DOC REV: CPT | Performed by: NURSE PRACTITIONER

## 2020-03-23 PROCEDURE — G8482 FLU IMMUNIZE ORDER/ADMIN: HCPCS | Performed by: NURSE PRACTITIONER

## 2020-03-23 PROCEDURE — 99214 OFFICE O/P EST MOD 30 MIN: CPT | Performed by: NURSE PRACTITIONER

## 2020-03-23 PROCEDURE — G8417 CALC BMI ABV UP PARAM F/U: HCPCS | Performed by: NURSE PRACTITIONER

## 2020-03-23 PROCEDURE — 4040F PNEUMOC VAC/ADMIN/RCVD: CPT | Performed by: NURSE PRACTITIONER

## 2020-03-23 PROCEDURE — 87804 INFLUENZA ASSAY W/OPTIC: CPT | Performed by: NURSE PRACTITIONER

## 2020-03-23 PROCEDURE — G8427 DOCREV CUR MEDS BY ELIG CLIN: HCPCS | Performed by: NURSE PRACTITIONER

## 2020-03-23 PROCEDURE — 4004F PT TOBACCO SCREEN RCVD TLK: CPT | Performed by: NURSE PRACTITIONER

## 2020-03-23 PROCEDURE — 87880 STREP A ASSAY W/OPTIC: CPT | Performed by: NURSE PRACTITIONER

## 2020-03-23 RX ORDER — BENZONATATE 100 MG/1
100 CAPSULE ORAL 3 TIMES DAILY PRN
Qty: 30 CAPSULE | Refills: 0 | Status: SHIPPED | OUTPATIENT
Start: 2020-03-23 | End: 2020-04-02 | Stop reason: SDUPTHER

## 2020-03-23 RX ORDER — DOXYCYCLINE HYCLATE 100 MG
100 TABLET ORAL 2 TIMES DAILY
Qty: 20 TABLET | Refills: 0 | Status: SHIPPED | OUTPATIENT
Start: 2020-03-23 | End: 2020-12-02

## 2020-03-23 ASSESSMENT — ENCOUNTER SYMPTOMS
TROUBLE SWALLOWING: 0
VOICE CHANGE: 0
EYE PAIN: 0
COUGH: 1
SORE THROAT: 1
NAUSEA: 0
ABDOMINAL PAIN: 0
RHINORRHEA: 0
WHEEZING: 0
VOMITING: 0
CHEST TIGHTNESS: 0
SHORTNESS OF BREATH: 0

## 2020-03-23 NOTE — PATIENT INSTRUCTIONS
Patient Education        Bronchitis: Care Instructions  Your Care Instructions    Bronchitis is inflammation of the bronchial tubes, which carry air to the lungs. The tubes swell and produce mucus, or phlegm. The mucus and inflamed bronchial tubes make you cough. You may have trouble breathing. Most cases of bronchitis are caused by viruses like those that cause colds. Antibiotics usually do not help and they may be harmful. Bronchitis usually develops rapidly and lasts about 2 to 3 weeks in otherwise healthy people. Follow-up care is a key part of your treatment and safety. Be sure to make and go to all appointments, and call your doctor if you are having problems. It's also a good idea to know your test results and keep a list of the medicines you take. How can you care for yourself at home? · Take all medicines exactly as prescribed. Call your doctor if you think you are having a problem with your medicine. · Get some extra rest.  · Take an over-the-counter pain medicine, such as acetaminophen (Tylenol), ibuprofen (Advil, Motrin), or naproxen (Aleve) to reduce fever and relieve body aches. Read and follow all instructions on the label. · Do not take two or more pain medicines at the same time unless the doctor told you to. Many pain medicines have acetaminophen, which is Tylenol. Too much acetaminophen (Tylenol) can be harmful. · Take an over-the-counter cough medicine that contains dextromethorphan to help quiet a dry, hacking cough so that you can sleep. Avoid cough medicines that have more than one active ingredient. Read and follow all instructions on the label. · Breathe moist air from a humidifier, hot shower, or sink filled with hot water. The heat and moisture will thin mucus so you can cough it out. · Do not smoke. Smoking can make bronchitis worse. If you need help quitting, talk to your doctor about stop-smoking programs and medicines.  These can increase your chances of quitting for good.  When should you call for help? Call 911 anytime you think you may need emergency care. For example, call if:    · You have severe trouble breathing.    Call your doctor now or seek immediate medical care if:    · You have new or worse trouble breathing.     · You cough up dark brown or bloody mucus (sputum).     · You have a new or higher fever.     · You have a new rash.    Watch closely for changes in your health, and be sure to contact your doctor if:    · You cough more deeply or more often, especially if you notice more mucus or a change in the color of your mucus.     · You are not getting better as expected. Where can you learn more? Go to https://Av[employer]IL[employer]OPeb[employer] org and sign in to your "[employer] account. Enter H333 in the [employer] box to learn more about \"Bronchitis: Care Instructions. \"     If you do not have an account, please click on the \"Sign Up Now\" link. Current as of: June 9, 2019Content Version: 12.4  © 0953-8885 [employer]. Care instructions adapted under license by [facility] ([facility]). If you have questions about a medical condition or this instruction, always ask your healthcare professional. [place] [address] any warranty or liability for your use of this information. Patient Education        Sore Throat: Care Instructions  Your Care Instructions    Infection by bacteria or a virus causes most sore throats. Cigarette smoke, dry air, air pollution, allergies, and yelling can also cause a sore throat. Sore throats can be painful and annoying. Fortunately, most sore throats go away on their own. If you have a bacterial infection, your doctor may prescribe antibiotics. Follow-up care is a key part of your treatment and safety. Be sure to make and go to all appointments, and call your doctor if you are having problems. It's also a good idea to know your test results and keep a list of the medicines you take.   How can you care for alcohol, covering all surfaces of your hands and rubbing them together until they feel dry. Soap and water are the best option if hands are visibly dirty. Avoid touching your eyes, nose, and mouth with unwashed hands. Avoid sharing personal household items  You should not share dishes, drinking glasses, cups, eating utensils, towels, or bedding with other people or pets in your home. After using these items, they should be washed thoroughly with soap and water. Clean all high-touch surfaces everyday  High touch surfaces include counters, tabletops, doorknobs, bathroom fixtures, toilets, phones, keyboards, tablets, and bedside tables. Also, clean any surfaces that may have blood, stool, or body fluids on them. Use a household cleaning spray or wipe, according to the label instructions. Labels contain instructions for safe and effective use of the cleaning product including precautions you should take when applying the product, such as wearing gloves and making sure you have good ventilation during use of the product. Monitor your symptoms  Seek prompt medical attention if your illness is worsening (e.g., difficulty breathing). Before seeking care, call your healthcare provider and tell them that you have, or are being evaluated for, COVID-19. Put on a facemask before you enter the facility. These steps will help the healthcare providers office to keep other people in the office or waiting room from getting infected or exposed. Ask your healthcare provider to call the local or state health department. Persons who are placed under active monitoring or facilitated self-monitoring should follow instructions provided by their local health department or occupational health professionals, as appropriate. When working with your local health department check their available hours. If you have a medical emergency and need to call 911, notify the dispatch personnel that you have, or are being evaluated for COVID-19.  If

## 2020-03-23 NOTE — PROGRESS NOTES
[facility]  [facility]  [address]  [address] [address]  Dept: [phone]  Dept Fax: [phone]    Sandy August is a 69 y.o. male who presents today for his medical conditions/complaints of   Chief Complaint   Patient presents with    Pharyngitis    Cough    Generalized Body Aches          HPI:     /68 (Site: Left Lower Arm)   Pulse 95   Temp 98.9 °F (37.2 °C) (Temporal)   Resp 20   Ht 5' 7\" (1.702 m)   Wt 263 lb (119.3 kg)   SpO2 95%   BMI 41.19 kg/m²       HPI  Pt presented to the urgent care today with c/o body aches. This is a new problem. The current episode started 1.5 weeks ago. The problem has been unchanged since onset. Associated symptoms include: cough- sometimes dry, other times productive- mores so in the morning, sore throat . Pertinent negatives include: No fever, chills,  . Pt has tried throat lozenges, cough drops with some improvement. History of smoker with COPD. Has SOB, but states no more then usual. Using inhalers with some relief. Past Medical History:   Diagnosis Date    Arthritis     COPD (chronic obstructive pulmonary disease) ([address]     Diabetes mellitus ([address]     GURPREET on CPAP         Past Surgical History:   Procedure Laterality Date    APPENDECTOMY      CHOLECYSTECTOMY  25 years ago    JOINT REPLACEMENT Right 2017    LUMBAR LAMINECTOMY  10/24/2018    L2-L5 laminectomy, foraminotomy    TONSILLECTOMY      TOTAL KNEE ARTHROPLASTY Left 2017       History reviewed. No pertinent family history. Social History     Tobacco Use    Smoking status: Current Every Day Smoker     Packs/day: 1.00     Years: 42.00     Pack years: 42.00     Types: Cigarettes     Last attempt to quit: 10/12/2017     Years since quittin.4    Smokeless tobacco: Never Used   Substance Use Topics    Alcohol use: No        Prior to Visit Medications    Medication Sig Taking?  Authorizing Provider   doxycycline hyclate (VIBRA-TABS) 100 MG tablet Take 1 tablet by mouth 2 times daily Yes DONTAE Mcdonald CNP   benzonatate (TESSALON PERLES) 100 MG capsule Take 1 capsule by mouth 3 times daily as needed for Cough Yes DONTAE Mcdonald CNP   oxyCODONE-acetaminophen (PERCOCET)  MG per tablet Take 1 tablet by mouth every 6 hours as needed for Pain for up to 30 days. Yes Alix Baer MD   fluocinonide (LIDEX) 0.05 % cream Apply topically 2 times daily. Yes Alix Baer MD   atorvastatin (LIPITOR) 20 MG tablet TAKE 1 TABLET BY MOUTH NIGHTLY Yes Alix Baer MD   SYMBICORT 160-4.5 MCG/ACT AERO INHALE 2 PUFFS BY MOUTH TWO TIMES A DAY  Yes Alix Baer MD   hydrochlorothiazide (HYDRODIURIL) 25 MG tablet TAKE 1 TABLET BY MOUTH ONE TIME A DAY  Yes Alix Baer MD   furosemide (LASIX) 20 MG tablet Take 1 tablet by mouth daily as needed (swelling) Yes Alix Baer MD   traZODone (DESYREL) 50 MG tablet Take 1 tablet by mouth nightly as needed for Sleep Yes Alix Baer MD   ibuprofen (ADVIL;MOTRIN) 800 MG tablet TAKE 1 TABLET BY MOUTH EVERY EIGHT HOURS AS NEEDED FOR PAIN Yes Alix Baer MD   PROAIR  (14 Base) MCG/ACT inhaler INHALE 2 PUFFS BY MOUTH EVERY FOUR HOURS AS NEEDED FOR SHORTNESS OF BREATH Yes Alix Baer MD   gabapentin (NEURONTIN) 300 MG capsule TAKE 1 CAPSULE BY MOUTH THREE TIMES A DAY Yes Ilana Null MD   ammonium lactate (LAC-HYDRIN) 12 % lotion Apply topically daily.  Yes Alix Baer MD   ipratropium-albuterol (DUONEB) 0.5-2.5 (3) MG/3ML SOLN nebulizer solution Inhale 3 mLs into the lungs every 6 hours as needed for Shortness of Breath Yes Alix Baer MD   metFORMIN (GLUCOPHAGE) 1000 MG tablet Take 1 tablet by mouth daily (with breakfast) Yes Alix Baer MD   Vitamin D, Ergocalciferol, 2000 units CAPS Take 1 capsule by mouth daily Yes Alix Baer MD   benazepril (LOTENSIN) 20 MG tablet TAKE 1 TABLET BY MOUTH ONE TIME A DAY   Patient not taking: Reported on 3/23/2020  Monica Guardado MD   varenicline (CHANTIX STARTING MONTH PAK) 0.5 MG X 11 & 1 MG X 42 tablet Take by mouth. Patient not taking: Reported on 3/23/2020  Monica Guardado MD   varenicline ([address] BOOKER) 1 MG tablet Take 1 tablet by mouth 2 times daily  Patient not taking: Reported on 3/23/2020  Monica Guardado MD       Allergies   Allergen Reactions    Cheratussin Ac [Guaifenesin-Codeine] Other (See Comments)     Coughing Attack. Subjective:      Review of Systems   Constitutional: Positive for fatigue. Negative for chills and fever. HENT: Positive for sore throat. Negative for congestion, ear pain, rhinorrhea, trouble swallowing and voice change. Eyes: Negative for pain and visual disturbance. Respiratory: Positive for cough. Negative for chest tightness, shortness of breath (no more then usual) and wheezing. Cardiovascular: Negative for chest pain, palpitations and leg swelling. Gastrointestinal: Negative for abdominal pain, nausea and vomiting. Genitourinary: Negative for decreased urine volume and difficulty urinating. Musculoskeletal: Positive for arthralgias and myalgias. Negative for gait problem and neck pain. Skin: Negative for pallor and rash. Neurological: Negative for weakness, light-headedness and headaches. Psychiatric/Behavioral: Negative for confusion and sleep disturbance. Objective:     Physical Exam  Vitals signs and nursing note reviewed. Constitutional:       General: He is not in acute distress. Appearance: Normal appearance. HENT:      Head: Normocephalic and atraumatic. Jaw: No trismus. Right Ear: Tympanic membrane and ear canal normal.      Left Ear: Tympanic membrane and ear canal normal.      Nose: Nose normal.      Mouth/Throat:      Lips: Pink. Mouth: Mucous membranes are moist.      Pharynx: Oropharynx is clear. Uvula midline. Influenza A/B   Result Value Ref Range    Influenza A Ab negative     Influenza B Ab negative    POCT rapid strep A   Result Value Ref Range    Strep A Ag None Detected None Detected     Influenza A/B negative. Rapid strep negative. Based on the patient's history and exam will treat as bronchitis. The patient does not have clinical findings suggestive of pneumonia. There is no abnormal vital signs (pulse is not greater than 100/ minute, respirations are not greater than 24/ minute, temperature is not greater than 38 degrees Celsius, or oxygen saturation less than 95 percent) There is no tachypnea, rales, or signs of parenchymal consolidation on exam. There are no changes in mental status or behavioral changes. History of COPD. Pt to fill and take medications as prescribed. Rest, increase fluids. Return if no improvement in symptoms. Go to the ER for any emergent concern. Preventing the Spread of Coronavirus Disease 2019 in Homes and Residential Communities: For the most recent information go to: [employer]       Patient given educational materials - see patientinstructions. Discussed use, benefit, and side effects of prescribed medications. All patient questions answered. Pt verbalized understanding. Instructed to continue current medications, diet and exercise. Patient agreedwith treatment plan. Follow up as directed.      Electronically signed by DONTAE Nassar CNP on 3/23/2020 at 3:19 PM

## 2020-04-02 ENCOUNTER — TELEPHONE (OUTPATIENT)
Dept: PRIMARY CARE CLINIC | Age: 70
End: 2020-04-02

## 2020-04-02 RX ORDER — BENZONATATE 100 MG/1
100 CAPSULE ORAL 3 TIMES DAILY PRN
Qty: 30 CAPSULE | Refills: 0 | Status: SHIPPED | OUTPATIENT
Start: 2020-04-02 | End: 2020-04-12

## 2020-04-02 NOTE — TELEPHONE ENCOUNTER
Patient is calling stating that he would like a refill of doxycycline and tessalon perles states that he felling better still has a cough. Please advise.

## 2020-04-13 RX ORDER — OXYCODONE AND ACETAMINOPHEN 10; 325 MG/1; MG/1
1 TABLET ORAL EVERY 6 HOURS PRN
Qty: 120 TABLET | Refills: 0 | Status: SHIPPED | OUTPATIENT
Start: 2020-04-13 | End: 2020-05-19 | Stop reason: SDUPTHER

## 2020-04-20 RX ORDER — HYDROCHLOROTHIAZIDE 25 MG/1
TABLET ORAL
Qty: 30 TABLET | Refills: 0 | Status: SHIPPED | OUTPATIENT
Start: 2020-04-20 | End: 2020-05-18

## 2020-04-22 ENCOUNTER — TELEMEDICINE (OUTPATIENT)
Dept: PRIMARY CARE CLINIC | Age: 70
End: 2020-04-22
Payer: MEDICARE

## 2020-04-22 VITALS — WEIGHT: 263 LBS | HEIGHT: 67 IN | RESPIRATION RATE: 15 BRPM | BODY MASS INDEX: 41.28 KG/M2

## 2020-04-22 PROCEDURE — 1123F ACP DISCUSS/DSCN MKR DOCD: CPT | Performed by: FAMILY MEDICINE

## 2020-04-22 PROCEDURE — 3017F COLORECTAL CA SCREEN DOC REV: CPT | Performed by: FAMILY MEDICINE

## 2020-04-22 PROCEDURE — G8427 DOCREV CUR MEDS BY ELIG CLIN: HCPCS | Performed by: FAMILY MEDICINE

## 2020-04-22 PROCEDURE — 99212 OFFICE O/P EST SF 10 MIN: CPT | Performed by: FAMILY MEDICINE

## 2020-04-22 PROCEDURE — 4040F PNEUMOC VAC/ADMIN/RCVD: CPT | Performed by: FAMILY MEDICINE

## 2020-04-22 RX ORDER — IBUPROFEN 800 MG/1
TABLET ORAL
Qty: 90 TABLET | Refills: 0 | Status: SHIPPED | OUTPATIENT
Start: 2020-04-22 | End: 2020-05-22

## 2020-04-22 RX ORDER — CYCLOBENZAPRINE HCL 10 MG
10 TABLET ORAL EVERY 8 HOURS PRN
Qty: 15 TABLET | Refills: 0 | Status: SHIPPED | OUTPATIENT
Start: 2020-04-22 | End: 2020-12-02

## 2020-04-22 NOTE — PROGRESS NOTES
Maru Garcia is a 69 y.o. Stacia Giron presents today for his medical conditions/complaints as noted below. Chief Complaint   Patient presents with    Rash     Pt states rash on lt hand x 3 weeks, itchy, red and raised. Patient agrees to VV    HPI:     HPI  Rash: Left post hand x 3 weeks. Started by thumb now on wrist.   Sometimes little white pimples and now flakey and dry, itching off and on. Washes hands a lot. Tried lidex cream  on it since march; cleared up a little bit then came back again  No other spots of rash on body. muscle spasms in upper back , hx of back pain, spasms in past off and on. Current Outpatient Medications   Medication Sig Dispense Refill    ibuprofen (ADVIL;MOTRIN) 800 MG tablet TAKE 1 TABLET BY MOUTH EVERY EIGHT HOURS AS NEEDED FOR PAIN  90 tablet 0    Crisaborole 2 % OINT Apply 1 applicator topically 2 times daily 60 g 1    cyclobenzaprine (FLEXERIL) 10 MG tablet Take 1 tablet by mouth every 8 hours as needed for Muscle spasms 15 tablet 0    hydroCHLOROthiazide (HYDRODIURIL) 25 MG tablet TAKE 1 TABLET BY MOUTH ONE TIME A DAY  30 tablet 0    oxyCODONE-acetaminophen (PERCOCET)  MG per tablet Take 1 tablet by mouth every 6 hours as needed for Pain for up to 30 days. 120 tablet 0    doxycycline hyclate (VIBRA-TABS) 100 MG tablet Take 1 tablet by mouth 2 times daily 20 tablet 0    fluocinonide (LIDEX) 0.05 % cream Apply topically 2 times daily.  30 g 0    atorvastatin (LIPITOR) 20 MG tablet TAKE 1 TABLET BY MOUTH NIGHTLY 90 tablet 0    SYMBICORT 160-4.5 MCG/ACT AERO INHALE 2 PUFFS BY MOUTH TWO TIMES A DAY  10.2 g 3    furosemide (LASIX) 20 MG tablet Take 1 tablet by mouth daily as needed (swelling) 90 tablet 1    traZODone (DESYREL) 50 MG tablet Take 1 tablet by mouth nightly as needed for Sleep 90 tablet 3    benazepril (LOTENSIN) 20 MG tablet TAKE 1 TABLET BY MOUTH ONE TIME A DAY  90 tablet 3    PROAIR  (90 Base) MCG/ACT inhaler INHALE 2 PUFFS BY MOUTH EVERY FOUR HOURS AS NEEDED FOR SHORTNESS OF BREATH 8.5 g 4    gabapentin (NEURONTIN) 300 MG capsule TAKE 1 CAPSULE BY MOUTH THREE TIMES A DAY  0    ammonium lactate (LAC-HYDRIN) 12 % lotion Apply topically daily. 225 mL 2    ipratropium-albuterol (DUONEB) 0.5-2.5 (3) MG/3ML SOLN nebulizer solution Inhale 3 mLs into the lungs every 6 hours as needed for Shortness of Breath 360 mL 1    metFORMIN (GLUCOPHAGE) 1000 MG tablet Take 1 tablet by mouth daily (with breakfast) 90 tablet 3    Vitamin D, Ergocalciferol, 2000 units CAPS Take 1 capsule by mouth daily 90 capsule 3     No current facility-administered medications for this visit. Allergies   Allergen Reactions    Cheratussin Ac [Guaifenesin-Codeine] Other (See Comments)     Coughing Attack. Subjective:     Review of Systems   Constitutional: Negative for chills and fever. Skin: Positive for rash. Objective:     Resp 15   Ht 5' 7\" (1.702 m)   Wt 263 lb (119.3 kg)   BMI 41.19 kg/m²   Physical Exam  Vitals signs and nursing note reviewed. Constitutional:       Appearance: Normal appearance. Skin:     General: Skin is warm. Findings: Erythema and rash present. Comments: Viewed patient's hand , and took pictures  Looks dry red flat patches post hand, flakey   Neurological:      Mental Status: He is alert. Comments: Patient sitting in his car parked. Psychiatric:         Mood and Affect: Mood normal.         Behavior: Behavior normal.         Thought Content: Thought content normal.         Assessment:       Diagnosis Orders   1. Atopic dermatitis, unspecified type  Crisaborole 2 % OINT   2. Muscle spasm  cyclobenzaprine (FLEXERIL) 10 MG tablet        Plan:      No follow-ups on file. F/u with Dr Janusz Veliz. Keep using the lidex cream and add eucrisa  Use hand lotion after each hand washing. Do NOT take flexeril at the same time as pain pills.    No orders of the defined types were placed in this

## 2020-05-13 ENCOUNTER — TELEPHONE (OUTPATIENT)
Dept: PRIMARY CARE CLINIC | Age: 70
End: 2020-05-13

## 2020-05-13 NOTE — TELEPHONE ENCOUNTER
Patient notified- Verbalized understanding  Patient would like to have his wife pick this up. Put up front for .

## 2020-05-18 RX ORDER — HYDROCHLOROTHIAZIDE 25 MG/1
TABLET ORAL
Qty: 30 TABLET | Refills: 3 | Status: SHIPPED | OUTPATIENT
Start: 2020-05-18 | End: 2020-11-18

## 2020-05-19 RX ORDER — OXYCODONE AND ACETAMINOPHEN 10; 325 MG/1; MG/1
1 TABLET ORAL EVERY 6 HOURS PRN
Qty: 120 TABLET | Refills: 0 | Status: SHIPPED | OUTPATIENT
Start: 2020-05-19 | End: 2020-06-24 | Stop reason: SDUPTHER

## 2020-05-22 RX ORDER — IBUPROFEN 800 MG/1
TABLET ORAL
Qty: 90 TABLET | Refills: 0 | Status: SHIPPED | OUTPATIENT
Start: 2020-05-22 | End: 2020-06-17

## 2020-06-10 ENCOUNTER — OFFICE VISIT (OUTPATIENT)
Dept: PRIMARY CARE CLINIC | Age: 70
End: 2020-06-10
Payer: COMMERCIAL

## 2020-06-10 VITALS
BODY MASS INDEX: 40.87 KG/M2 | TEMPERATURE: 97.8 F | HEIGHT: 67 IN | WEIGHT: 260.4 LBS | DIASTOLIC BLOOD PRESSURE: 70 MMHG | SYSTOLIC BLOOD PRESSURE: 130 MMHG | OXYGEN SATURATION: 94 % | HEART RATE: 85 BPM

## 2020-06-10 LAB — HBA1C MFR BLD: 6 %

## 2020-06-10 PROCEDURE — 83036 HEMOGLOBIN GLYCOSYLATED A1C: CPT | Performed by: FAMILY MEDICINE

## 2020-06-10 PROCEDURE — 4004F PT TOBACCO SCREEN RCVD TLK: CPT | Performed by: FAMILY MEDICINE

## 2020-06-10 PROCEDURE — 3017F COLORECTAL CA SCREEN DOC REV: CPT | Performed by: FAMILY MEDICINE

## 2020-06-10 PROCEDURE — 3044F HG A1C LEVEL LT 7.0%: CPT | Performed by: FAMILY MEDICINE

## 2020-06-10 PROCEDURE — 3023F SPIROM DOC REV: CPT | Performed by: FAMILY MEDICINE

## 2020-06-10 PROCEDURE — 80305 DRUG TEST PRSMV DIR OPT OBS: CPT | Performed by: FAMILY MEDICINE

## 2020-06-10 PROCEDURE — 1123F ACP DISCUSS/DSCN MKR DOCD: CPT | Performed by: FAMILY MEDICINE

## 2020-06-10 PROCEDURE — 99214 OFFICE O/P EST MOD 30 MIN: CPT | Performed by: FAMILY MEDICINE

## 2020-06-10 PROCEDURE — 2022F DILAT RTA XM EVC RTNOPTHY: CPT | Performed by: FAMILY MEDICINE

## 2020-06-10 PROCEDURE — G8417 CALC BMI ABV UP PARAM F/U: HCPCS | Performed by: FAMILY MEDICINE

## 2020-06-10 PROCEDURE — 4040F PNEUMOC VAC/ADMIN/RCVD: CPT | Performed by: FAMILY MEDICINE

## 2020-06-10 PROCEDURE — G8427 DOCREV CUR MEDS BY ELIG CLIN: HCPCS | Performed by: FAMILY MEDICINE

## 2020-06-10 PROCEDURE — G8926 SPIRO NO PERF OR DOC: HCPCS | Performed by: FAMILY MEDICINE

## 2020-06-10 RX ORDER — BUDESONIDE AND FORMOTEROL FUMARATE DIHYDRATE 160; 4.5 UG/1; UG/1
2 AEROSOL RESPIRATORY (INHALATION) 2 TIMES DAILY
Qty: 10.2 G | Refills: 5 | Status: SHIPPED | OUTPATIENT
Start: 2020-06-10 | End: 2021-01-13 | Stop reason: SDUPTHER

## 2020-06-10 RX ORDER — ALBUTEROL SULFATE 90 UG/1
2 AEROSOL, METERED RESPIRATORY (INHALATION) EVERY 6 HOURS PRN
Qty: 8.5 G | Refills: 5 | Status: SHIPPED | OUTPATIENT
Start: 2020-06-10

## 2020-06-10 ASSESSMENT — ENCOUNTER SYMPTOMS
CHEST TIGHTNESS: 0
SHORTNESS OF BREATH: 0
EYE PAIN: 0
EYE DISCHARGE: 0
COUGH: 0
VOMITING: 0
NAUSEA: 0
SORE THROAT: 0
BACK PAIN: 1

## 2020-06-10 NOTE — PROGRESS NOTES
[facility] PRIMARY CARE  [address] [address]  Dept: [address] [facility] Olivier is a 69 y.o. male who presents today for his medical conditions/complaintsas noted below. Chief Complaint   Patient presents with    Diabetes       HPI:     HPI- Uses his albuterol inhaler about 2 times per week. He feels like his COPD has been stable and not progressive. He denies use of street drugs. Complains of a sore on his left hand. It is sometimes itchy and he has had it for a couple months. Has been using an antifungal (athletes foot cream) that provided some relief. He was given a steroid and that made it worse. Patient states scheduled for radiofrequency ablation with pain management. Hopefully this will help his back discomfort. Patient states not checking his blood sugars on a regular basis. Has had no low blood sugar reactions. Breathing is about the same. Has been using his inhalers on a regular basis. LDL Calculated (mg/dL)   Date Value   01/12/2018 65       (goal LDL is <100)   AST (U/L)   Date Value   01/03/2020 17     ALT (U/L)   Date Value   01/03/2020 22     BUN (mg/dL)   Date Value   01/03/2020 29 (H)     BP Readings from Last 3 Encounters:   06/10/20 130/70   03/23/20 113/68   03/04/20 134/68          (goal 120/80)    Past Medical History:   Diagnosis Date    Arthritis     [facility]     Diabetes mellitus ([facility]     GURPREET on CPAP       Past Surgical History:   Procedure Laterality Date    APPENDECTOMY      CHOLECYSTECTOMY  25 years ago    JOINT REPLACEMENT Right 11/07/2017    LUMBAR LAMINECTOMY  10/24/2018    L2-L5 laminectomy, foraminotomy    TONSILLECTOMY      TOTAL KNEE ARTHROPLASTY Left 02/01/2017       No family history on file.     Social History     Tobacco Use    Smoking status: Current Every Day Smoker     Packs/day: 1.00     Years: 42.00     Pack years: 42.00 capsule by mouth daily 90 capsule 3     No current facility-administered medications for this visit. Allergies   Allergen Reactions    Cheratussin Ac [Guaifenesin-Codeine] Other (See Comments)     Coughing Attack. Health Maintenance   Topic Date Due    Diabetic retinal exam  03/08/1960    Shingles Vaccine (1 of 2) 03/08/1999    Low dose CT lung screening  04/03/2020    Diabetic foot exam  05/22/2020    Diabetic microalbuminuria test  05/22/2020    Colon Cancer Screen FIT/FOBT  05/22/2020    Lipid screen  09/11/2020    Annual Wellness Visit (AWV)  12/04/2020    Potassium monitoring  01/03/2021    Creatinine monitoring  01/03/2021    A1C test (Diabetic or Prediabetic)  03/04/2021    DTaP/Tdap/Td vaccine (3 - Td) 07/27/2027    Flu vaccine  Completed    Pneumococcal 65+ years Vaccine  Completed    AAA screen  Completed    Hepatitis C screen  Completed    Hepatitis A vaccine  Aged Out    Hib vaccine  Aged Out    Meningococcal (ACWY) vaccine  Aged Out       Subjective:      Review of Systems   Constitutional: Negative for activity change, appetite change, fatigue and fever. HENT: Negative for congestion and sore throat. Eyes: Negative for pain, discharge and visual disturbance. Respiratory: Negative for cough, chest tightness and shortness of breath. Cardiovascular: Negative for chest pain and palpitations. Gastrointestinal: Negative for nausea and vomiting. Endocrine: Negative for polydipsia and polyphagia. Musculoskeletal: Positive for back pain. Neurological: Negative for dizziness and numbness. Psychiatric/Behavioral: Negative for sleep disturbance. Objective:     /70   Pulse 85   Temp 97.8 °F (36.6 °C)   Ht 5' 6.96\" (1.701 m)   Wt 260 lb 6.4 oz (118.1 kg)   SpO2 94%   BMI 40.83 kg/m²   Physical Exam  Vitals signs and nursing note reviewed. Constitutional:       General: He is not in acute distress. Appearance: He is well-developed.    HENT: Diagnosis Orders   1. High risk medication use  POCT Rapid Drug Screen   2. Chronic obstructive pulmonary disease, unspecified COPD type (HCC)  budesonide-formoterol (SYMBICORT) 160-4.5 MCG/ACT AERO    albuterol sulfate HFA (PROAIR HFA) 108 (90 Base) MCG/ACT inhaler   3. Lumbar radicular pain     4. Controlled type 2 diabetes mellitus without complication, without long-term current use of insulin (HCC)  POCT microalbumin    HM DIABETES FOOT EXAM    TX COLLECTION CAPILLARY BLOOD SPECIMEN    POCT glycosylated hemoglobin (Hb A1C)   5. Encounter for screening for malignant neoplasm of colon  Cologuard        Plan:    renew inhaler   Await RFA with pain management  Get eye report from [place] [id number]  Urine drug screen  today    Return in about 3 months (around 9/10/2020). Orders Placed This Encounter   Procedures    Coluard     This test is performed by an external laboratory and is used for result attachment only. Please fill out the appropriate paperwork required by the processing laboratory. See [email or web address] for further information. Standing Status:   Future     Standing Expiration Date:   8/10/2020    POCT microalbumin    POCT Rapid Drug Screen    POCT glycosylated hemoglobin (Hb A1C)    HM DIABETES FOOT EXAM    TX COLLECTION CAPILLARY BLOOD SPECIMEN     Orders Placed This Encounter   Medications    budesonide-formoterol (SYMBICORT) 160-4.5 MCG/ACT AERO     Sig: Inhale 2 puffs into the lungs 2 times daily     Dispense:  10.2 g     Refill:  5    albuterol sulfate HFA (PROAIR HFA) 108 (90 Base) MCG/ACT inhaler     Sig: Inhale 2 puffs into the lungs every 6 hours as needed for Wheezing     Dispense:  8.5 g     Refill:  5    ciclopirox (LOPROX) 0.77 % cream     Sig: Apply topically 2 times daily. Dispense:  30 g     Refill:  1       Patient given educationalmaterials - see patient instructions. Discussed use, benefit, and side effectsof prescribed medications. All patient questions answered. Pt voiced understanding. Reviewed health maintenance. Instructed to continue current medications, diet andexercise. Patient agreed with treatment plan. Follow up as directed.      Electronicallysigned by Ayaan Matias MD on 6/10/2020 at 5:15 PM

## 2020-06-11 LAB
AMPHETAMINE SCREEN, URINE: NEGATIVE
BARBITURATE SCREEN, URINE: NEGATIVE
BENZODIAZEPINE SCREEN, URINE: NEGATIVE
BUPRENORPHINE URINE: NEGATIVE
COCAINE METABOLITE SCREEN URINE: NEGATIVE
GABAPENTIN SCREEN, URINE: NEGATIVE
MDMA URINE: NEGATIVE
METHADONE SCREEN, URINE: NEGATIVE
METHAMPHETAMINE, URINE: NEGATIVE
OPIATE SCREEN URINE: NEGATIVE
OXYCODONE SCREEN URINE: POSITIVE
PHENCYCLIDINE SCREEN URINE: NEGATIVE
PROPOXYPHENE SCREEN, URINE: NEGATIVE
THC SCREEN, URINE: NEGATIVE
TRICYCLIC ANTIDEPRESSANTS, UR: NEGATIVE

## 2020-06-17 RX ORDER — IBUPROFEN 800 MG/1
TABLET ORAL
Qty: 90 TABLET | Refills: 0 | Status: SHIPPED | OUTPATIENT
Start: 2020-06-17 | End: 2020-07-20

## 2020-06-17 RX ORDER — ATORVASTATIN CALCIUM 20 MG/1
TABLET, FILM COATED ORAL
Qty: 90 TABLET | Refills: 0 | Status: SHIPPED | OUTPATIENT
Start: 2020-06-17 | End: 2020-09-22

## 2020-06-24 RX ORDER — OXYCODONE AND ACETAMINOPHEN 10; 325 MG/1; MG/1
1 TABLET ORAL EVERY 6 HOURS PRN
Qty: 120 TABLET | Refills: 0 | Status: SHIPPED | OUTPATIENT
Start: 2020-06-24 | End: 2020-07-22 | Stop reason: SDUPTHER

## 2020-07-20 RX ORDER — IBUPROFEN 800 MG/1
TABLET ORAL
Qty: 90 TABLET | Refills: 0 | Status: SHIPPED | OUTPATIENT
Start: 2020-07-20 | End: 2020-08-17

## 2020-07-22 RX ORDER — OXYCODONE AND ACETAMINOPHEN 10; 325 MG/1; MG/1
1 TABLET ORAL EVERY 6 HOURS PRN
Qty: 120 TABLET | Refills: 0 | Status: SHIPPED | OUTPATIENT
Start: 2020-07-22 | End: 2020-08-25 | Stop reason: SDUPTHER

## 2020-08-17 RX ORDER — IBUPROFEN 800 MG/1
TABLET ORAL
Qty: 90 TABLET | Refills: 0 | Status: SHIPPED | OUTPATIENT
Start: 2020-08-17 | End: 2020-09-22

## 2020-08-25 RX ORDER — OXYCODONE AND ACETAMINOPHEN 10; 325 MG/1; MG/1
1 TABLET ORAL EVERY 6 HOURS PRN
Qty: 120 TABLET | Refills: 0 | Status: SHIPPED | OUTPATIENT
Start: 2020-08-25 | End: 2020-10-19 | Stop reason: SDUPTHER

## 2020-09-22 RX ORDER — ATORVASTATIN CALCIUM 20 MG/1
TABLET, FILM COATED ORAL
Qty: 90 TABLET | Refills: 0 | Status: SHIPPED | OUTPATIENT
Start: 2020-09-22 | End: 2020-12-21

## 2020-09-22 RX ORDER — IBUPROFEN 800 MG/1
TABLET ORAL
Qty: 90 TABLET | Refills: 0 | Status: SHIPPED | OUTPATIENT
Start: 2020-09-22 | End: 2020-10-19

## 2020-09-23 ENCOUNTER — OFFICE VISIT (OUTPATIENT)
Dept: PRIMARY CARE CLINIC | Age: 70
End: 2020-09-23
Payer: COMMERCIAL

## 2020-09-23 VITALS
OXYGEN SATURATION: 94 % | HEART RATE: 85 BPM | DIASTOLIC BLOOD PRESSURE: 70 MMHG | SYSTOLIC BLOOD PRESSURE: 134 MMHG | TEMPERATURE: 97.3 F | WEIGHT: 266.8 LBS | HEIGHT: 67 IN | BODY MASS INDEX: 41.88 KG/M2

## 2020-09-23 LAB — HBA1C MFR BLD: 6.1 %

## 2020-09-23 PROCEDURE — 90694 VACC AIIV4 NO PRSRV 0.5ML IM: CPT | Performed by: FAMILY MEDICINE

## 2020-09-23 PROCEDURE — G8417 CALC BMI ABV UP PARAM F/U: HCPCS | Performed by: FAMILY MEDICINE

## 2020-09-23 PROCEDURE — 4040F PNEUMOC VAC/ADMIN/RCVD: CPT | Performed by: FAMILY MEDICINE

## 2020-09-23 PROCEDURE — 2022F DILAT RTA XM EVC RTNOPTHY: CPT | Performed by: FAMILY MEDICINE

## 2020-09-23 PROCEDURE — 83036 HEMOGLOBIN GLYCOSYLATED A1C: CPT | Performed by: FAMILY MEDICINE

## 2020-09-23 PROCEDURE — 4004F PT TOBACCO SCREEN RCVD TLK: CPT | Performed by: FAMILY MEDICINE

## 2020-09-23 PROCEDURE — 3017F COLORECTAL CA SCREEN DOC REV: CPT | Performed by: FAMILY MEDICINE

## 2020-09-23 PROCEDURE — 90471 IMMUNIZATION ADMIN: CPT | Performed by: FAMILY MEDICINE

## 2020-09-23 PROCEDURE — 3044F HG A1C LEVEL LT 7.0%: CPT | Performed by: FAMILY MEDICINE

## 2020-09-23 PROCEDURE — G8427 DOCREV CUR MEDS BY ELIG CLIN: HCPCS | Performed by: FAMILY MEDICINE

## 2020-09-23 PROCEDURE — G8926 SPIRO NO PERF OR DOC: HCPCS | Performed by: FAMILY MEDICINE

## 2020-09-23 PROCEDURE — 1123F ACP DISCUSS/DSCN MKR DOCD: CPT | Performed by: FAMILY MEDICINE

## 2020-09-23 PROCEDURE — 99213 OFFICE O/P EST LOW 20 MIN: CPT | Performed by: FAMILY MEDICINE

## 2020-09-23 PROCEDURE — 3023F SPIROM DOC REV: CPT | Performed by: FAMILY MEDICINE

## 2020-09-23 RX ORDER — AMMONIUM LACTATE 12 G/100G
LOTION TOPICAL
Qty: 225 ML | Refills: 2 | Status: SHIPPED | OUTPATIENT
Start: 2020-09-23 | End: 2022-02-01

## 2020-09-23 ASSESSMENT — ENCOUNTER SYMPTOMS
RHINORRHEA: 0
EYE REDNESS: 0
SORE THROAT: 0
WHEEZING: 0
ABDOMINAL PAIN: 0
SHORTNESS OF BREATH: 0
EYE DISCHARGE: 0
VOMITING: 0
NAUSEA: 0
COUGH: 0
DIARRHEA: 0

## 2020-09-23 NOTE — PROGRESS NOTES
[address] [facility] PRIMARY CARE  [address] [address]  Dept: 700 [facility] [place] Olivier is a 69 y.o. male who presents today for his medical conditions/complaintsas noted below. Chief Complaint   Patient presents with    3 Month Follow-Up    Diabetes    Flu Vaccine     H/D flu       HPI:     HPI  Patient here for 3-month recheck. Denies any chest pain or shortness of breath. No low blood sugar reactions. Patient had low-dose CT scan done at the [place] but results not available. Patient denies any melena hematochezia. Patient's Cologuard test was due to be repeated but was never sent another kit. Patient states still having shortness of breath from his COPD unchanged. LDL Calculated (mg/dL)   Date Value   2018 65       (goal LDL is <100)   AST (U/L)   Date Value   2020 17     ALT (U/L)   Date Value   2020 22     BUN (mg/dL)   Date Value   2020 29 (H)     BP Readings from Last 3 Encounters:   20 134/70   06/10/20 130/70   20 113/68          (goal 120/80)    Past Medical History:   Diagnosis Date    Arthritis     [facility]) [facility]     Diabetes mellitus ([facility]     GURPREET on CPAP       Past Surgical History:   Procedure Laterality Date    APPENDECTOMY      CHOLECYSTECTOMY  25 years ago    JOINT REPLACEMENT Right 2017    LUMBAR LAMINECTOMY  10/24/2018    L2-L5 laminectomy, foraminotomy    TONSILLECTOMY      TOTAL KNEE ARTHROPLASTY Left 2017       No family history on file.     Social History     Tobacco Use    Smoking status: Current Every Day Smoker     Packs/day: 1.00     Years: 42.00     Pack years: 42.00     Types: Cigarettes     Last attempt to quit: 10/12/2017     Years since quittin.9    Smokeless tobacco: Never Used   Substance Use Topics    Alcohol use: No      Current Outpatient Medications   Medication Sig Dispense Refill    fluocinonide (LIDEX) 0.05 Right eye: No discharge. Left eye: No discharge. Conjunctiva/sclera: Conjunctivae normal.      Pupils: Pupils are equal, round, and reactive to light. Neck:      Thyroid: No thyromegaly. Trachea: No tracheal deviation. Cardiovascular:      Rate and Rhythm: Normal rate and regular rhythm. Heart sounds: Normal heart sounds. Pulmonary:      Effort: Pulmonary effort is normal. No respiratory distress. Breath sounds: Normal breath sounds. No wheezing. Lymphadenopathy:      Cervical: No cervical adenopathy. Skin:     General: Skin is warm. Findings: No rash. Neurological:      Mental Status: He is alert and oriented to person, place, and time. Psychiatric:         Mood and Affect: Mood normal.         Behavior: Behavior normal.         Thought Content: Thought content normal.         Assessment:       Diagnosis Orders   1. Controlled type 2 diabetes mellitus without complication, without long-term current use of insulin ([facility])  [place] COLLECTION CAPILLARY BLOOD SPECIMEN    POCT glycosylated hemoglobin (Hb A1C)    POCT microalbumin   2. Need for vaccination  INFLUENZA, QUADV, ADJUVANTED, 65 YRS =, IM, PF, PREFILL SYR, 0.5ML (FLUAD)   3. Encounter for screening for malignant neoplasm of colon  Cologuard   4. Chronic [facility] pulmonary disease, unspecified [facility]          Plan:    Cologuard ordered. Flu shot today. We will attempt to get release of records for low-dose CT chest.  Lidex cream for rash on arms. Renew Lac-Hydrin. Return in about 3 months (around 12/23/2020). Orders Placed This Encounter   Procedures    Cologuard     This test is performed by an external laboratory and is used for result attachment only. It is required that this order requisition be faxed to: [employer] @@ [phone]. See [email or web address] for further information.      Standing Status:   Future     Standing Expiration Date:   11/23/2020    INFLUENZA, QUADV, ADJUVANTED, 65 YRS =, IM, PF, PREFILL SYR, 0.5ML (FLUAD)    POCT glycosylated hemoglobin (Hb A1C)    POCT microalbumin    SC COLLECTION CAPILLARY BLOOD SPECIMEN     Orders Placed This Encounter   Medications    fluocinonide (LIDEX) 0.05 % cream     Sig: Apply topically 2 times daily as needed     Dispense:  60 g     Refill:  2    ammonium lactate (LAC-HYDRIN) 12 % lotion     Sig: Apply topically daily. Dispense:  225 mL     Refill:  2       Patient given educationalmaterials - see patient instructions. Discussed use, benefit, and side effectsof prescribed medications. All patient questions answered. Pt voiced understanding. Reviewed health maintenance. Instructed to continue current medications, diet andexercise. Patient agreed with treatment plan. Follow up as directed.      Electronicallysigned by Young Vanegas MD on 9/23/2020 at 5:10 PM

## 2020-10-19 RX ORDER — IBUPROFEN 800 MG/1
TABLET ORAL
Qty: 90 TABLET | Refills: 0 | Status: SHIPPED | OUTPATIENT
Start: 2020-10-19 | End: 2020-11-18

## 2020-10-19 RX ORDER — OXYCODONE AND ACETAMINOPHEN 10; 325 MG/1; MG/1
1 TABLET ORAL EVERY 6 HOURS PRN
Qty: 120 TABLET | Refills: 0 | Status: SHIPPED | OUTPATIENT
Start: 2020-10-19 | End: 2020-11-25 | Stop reason: SDUPTHER

## 2020-10-28 ENCOUNTER — TELEPHONE (OUTPATIENT)
Dept: PRIMARY CARE CLINIC | Age: 70
End: 2020-10-28

## 2020-10-28 NOTE — TELEPHONE ENCOUNTER
Pt called stating the rash on his hand he has had for a couple months now is not getting better even with applying creams and steroids he was advised to use. He states it seems to be spreading and itches really bad at times. He stated he was advised that if rash does not get better than recommend to dermatologist.   Pt is asking for referral to derm. Pt would like to go to [facility].        Referral order pending, please approve or deny       I will fax all info with referral .

## 2020-11-18 RX ORDER — IBUPROFEN 800 MG/1
TABLET ORAL
Qty: 90 TABLET | Refills: 0 | Status: SHIPPED | OUTPATIENT
Start: 2020-11-18 | End: 2020-12-02

## 2020-11-18 RX ORDER — HYDROCHLOROTHIAZIDE 25 MG/1
TABLET ORAL
Qty: 30 TABLET | Refills: 0 | Status: SHIPPED | OUTPATIENT
Start: 2020-11-18 | End: 2020-12-21

## 2020-11-25 RX ORDER — OXYCODONE AND ACETAMINOPHEN 10; 325 MG/1; MG/1
1 TABLET ORAL EVERY 6 HOURS PRN
Qty: 120 TABLET | Refills: 0 | Status: SHIPPED | OUTPATIENT
Start: 2020-11-25 | End: 2020-12-23 | Stop reason: SDUPTHER

## 2020-12-02 ENCOUNTER — OFFICE VISIT (OUTPATIENT)
Dept: PRIMARY CARE CLINIC | Age: 70
End: 2020-12-02
Payer: COMMERCIAL

## 2020-12-02 VITALS
BODY MASS INDEX: 42.41 KG/M2 | SYSTOLIC BLOOD PRESSURE: 122 MMHG | HEIGHT: 67 IN | HEART RATE: 91 BPM | WEIGHT: 270.2 LBS | TEMPERATURE: 96.7 F | OXYGEN SATURATION: 96 % | DIASTOLIC BLOOD PRESSURE: 74 MMHG

## 2020-12-02 PROBLEM — M43.16 SPONDYLOLISTHESIS AT L4-L5 LEVEL: Status: ACTIVE | Noted: 2019-06-13

## 2020-12-02 PROBLEM — G89.29 CHRONIC MIDLINE LOW BACK PAIN WITHOUT SCIATICA: Status: ACTIVE | Noted: 2018-01-10

## 2020-12-02 LAB
CREATININE URINE POCT: NORMAL
MICROALBUMIN/CREAT 24H UR: NORMAL MG/G{CREAT}
MICROALBUMIN/CREAT UR-RTO: NORMAL

## 2020-12-02 PROCEDURE — 82044 UR ALBUMIN SEMIQUANTITATIVE: CPT | Performed by: FAMILY MEDICINE

## 2020-12-02 PROCEDURE — G8484 FLU IMMUNIZE NO ADMIN: HCPCS | Performed by: FAMILY MEDICINE

## 2020-12-02 PROCEDURE — 99397 PER PM REEVAL EST PAT 65+ YR: CPT | Performed by: FAMILY MEDICINE

## 2020-12-02 RX ORDER — GABAPENTIN 300 MG/1
300 CAPSULE ORAL 4 TIMES DAILY
Qty: 120 CAPSULE | Refills: 5
Start: 2020-12-02 | End: 2021-01-13 | Stop reason: SDUPTHER

## 2020-12-02 RX ORDER — CELECOXIB 200 MG/1
CAPSULE ORAL
COMMUNITY
Start: 2020-11-12 | End: 2022-04-19 | Stop reason: SDUPTHER

## 2020-12-02 RX ORDER — CYCLOBENZAPRINE HCL 10 MG
10 TABLET ORAL EVERY 8 HOURS PRN
Qty: 60 TABLET | Refills: 1 | Status: SHIPPED | OUTPATIENT
Start: 2020-12-02 | End: 2021-01-13 | Stop reason: SDUPTHER

## 2020-12-02 ASSESSMENT — PATIENT HEALTH QUESTIONNAIRE - PHQ9
SUM OF ALL RESPONSES TO PHQ QUESTIONS 1-9: 0
1. LITTLE INTEREST OR PLEASURE IN DOING THINGS: 0
SUM OF ALL RESPONSES TO PHQ QUESTIONS 1-9: 0
2. FEELING DOWN, DEPRESSED OR HOPELESS: 0
SUM OF ALL RESPONSES TO PHQ9 QUESTIONS 1 & 2: 0
SUM OF ALL RESPONSES TO PHQ QUESTIONS 1-9: 0

## 2020-12-02 ASSESSMENT — ENCOUNTER SYMPTOMS
NAUSEA: 0
RHINORRHEA: 0
EYE DISCHARGE: 0
COUGH: 0
ABDOMINAL PAIN: 0
BACK PAIN: 1
SHORTNESS OF BREATH: 0
WHEEZING: 0
VOMITING: 0
DIARRHEA: 0
SORE THROAT: 0
EYE REDNESS: 0

## 2020-12-02 NOTE — PROGRESS NOTES
[address] [facility] PRIMARY CARE  [address] [facility]  [address] [address]  Dept: [address] [facility] is a 69 y.o. male who presents today for his medical conditions/complaintsas noted below. Chief Complaint   Patient presents with    Annual Exam       HPI:     HPI  Pt here for annual physical. States having spasms across the middle of the back, up to the neck. Using no medication. No fever or chills. Using percocet for lower back pain, up to 4 per day. No change in lower back pain. Seen neurosurgery, had MRI, told no other intervention recommended. Last MRI and notes from neurosurgery were reviewed. Patient states has order to do aquatic therapy. Patient has not been checking her sugars on a regular basis. Patient did have his low-dose CT chest done at the [facility] but as usual no results available. Patient states neurosurgery had changed his Motrin to Celebrex. States has not noticed much difference. LDL Calculated (mg/dL)   Date Value   01/12/2018 65       (goal LDL is <100)   AST (U/L)   Date Value   01/03/2020 17     ALT (U/L)   Date Value   01/03/2020 22     BUN (mg/dL)   Date Value   01/03/2020 29 (H)     BP Readings from Last 3 Encounters:   12/02/20 122/74   09/23/20 134/70   06/10/20 130/70          (goal 120/80)    Past Medical History:   Diagnosis Date    Arthritis     COPD [facility]chronic obstructive [facility] disease) ([facility] [address]     Diabetes mellitus ([facility] [address]     GURPREET on CPAP       Past Surgical History:   Procedure Laterality Date    APPENDECTOMY      CHOLECYSTECTOMY  25 years ago    JOINT REPLACEMENT Right 11/07/2017    LUMBAR LAMINECTOMY  10/24/2018    L2-L5 laminectomy, foraminotomy    TONSILLECTOMY      TOTAL KNEE ARTHROPLASTY Left 02/01/2017       No family history on file.     Social History     Tobacco Use    Smoking status: Current Every Day Smoker     Packs/day: 1.00     Years: 42.00     Pack years: 42.00     Types: Cigarettes     Last attempt to quit: 10/12/2017     Years since quitting: 3.1    Smokeless tobacco: Never Used   Substance Use Topics    Alcohol use: No      Current Outpatient Medications   Medication Sig Dispense Refill    celecoxib (CELEBREX) 200 MG capsule TAKE 1 CAPSULE BY MOUTH EVERY DAY      gabapentin (NEURONTIN) 300 MG capsule Take 1 capsule by mouth 4 times daily for 30 days. 120 capsule 5    cyclobenzaprine (FLEXERIL) 10 MG tablet Take 1 tablet by mouth every 8 hours as needed for Muscle spasms 60 tablet 1    oxyCODONE-acetaminophen (PERCOCET)  MG per tablet Take 1 tablet by mouth every 6 hours as needed for Pain for up to 30 days. 120 tablet 0    hydroCHLOROthiazide (HYDRODIURIL) 25 MG tablet TAKE 1 TABLET BY MOUTH ONE TIME A DAY  30 tablet 0    fluocinonide (LIDEX) 0.05 % cream Apply topically 2 times daily as needed 60 g 2    ammonium lactate (LAC-HYDRIN) 12 % lotion Apply topically daily. 225 mL 2    atorvastatin (LIPITOR) 20 MG tablet TAKE 1 TABLET BY MOUTH ONE TIME A DAY AT NIGHT 90 tablet 0    metFORMIN (GLUCOPHAGE) 1000 MG tablet TAKE 1 TABLET BY MOUTH ONE TIME A DAY (WITH BREAKFAST) 90 tablet 0    budesonide-formoterol (SYMBICORT) 160-4.5 MCG/ACT AERO Inhale 2 puffs into the lungs 2 times daily 10.2 g 5    albuterol sulfate HFA (PROAIR HFA) 108 (90 Base) MCG/ACT inhaler Inhale 2 puffs into the lungs every 6 hours as needed for Wheezing 8.5 g 5    ciclopirox (LOPROX) 0.77 % cream Apply topically 2 times daily. 30 g 1    Crisaborole 2 % OINT Apply 1 applicator topically 2 times daily 60 g 1    fluocinonide (LIDEX) 0.05 % cream Apply topically 2 times daily.  30 g 0    traZODone (DESYREL) 50 MG tablet Take 1 tablet by mouth nightly as needed for Sleep 90 tablet 3    benazepril (LOTENSIN) 20 MG tablet TAKE 1 TABLET BY MOUTH ONE TIME A DAY  90 tablet 3    ipratropium-albuterol (DUONEB) 0.5-2.5 (3) MG/3ML SOLN nebulizer solution Inhale 3 mLs into the lungs every 6 hours as needed for Shortness of Breath 360 mL 1    Vitamin D, Ergocalciferol, 2000 units CAPS Take 1 capsule by mouth daily 90 capsule 3     No current facility-administered medications for this visit. Allergies   Allergen Reactions    Cheratussin Ac [Guaifenesin-Codeine] Other (See Comments)     Coughing Attack. Health Maintenance   Topic Date Due    Diabetic retinal exam  03/08/1960    Shingles Vaccine (1 of 2) 03/08/1999    Low dose CT lung screening  04/03/2020    Diabetic microalbuminuria test  05/22/2020    Lipid screen  09/11/2020    Potassium monitoring  01/03/2021    Creatinine monitoring  01/03/2021    Diabetic foot exam  06/10/2021    A1C test (Diabetic or Prediabetic)  09/23/2021    Colon cancer screen fecal DNA test (Cologuard)  07/12/2023    DTaP/Tdap/Td vaccine (3 - Td) 07/27/2027    Flu vaccine  Completed    Pneumococcal 65+ years Vaccine  Completed    AAA screen  Completed    Hepatitis C screen  Completed    Hepatitis A vaccine  Aged Out    Hib vaccine  Aged Out    Meningococcal (ACWY) vaccine  Aged Out       Subjective:      Review of Systems   Constitutional: Negative for chills and fever. HENT: Negative for rhinorrhea and sore throat. Eyes: Negative for discharge and redness. Respiratory: Negative for cough, shortness of breath and wheezing. Cardiovascular: Negative for chest pain and palpitations. Gastrointestinal: Negative for abdominal pain, diarrhea, nausea and vomiting. Genitourinary: Negative for dysuria and frequency. Musculoskeletal: Positive for back pain. Negative for arthralgias and myalgias. Neurological: Negative for dizziness, light-headedness and headaches. Psychiatric/Behavioral: Negative for sleep disturbance. Objective:     /74   Pulse 91   Temp 96.7 °F (35.9 °C)   Ht 5' 6.96\" (1.701 m)   Wt 270 lb 3.2 oz (122.6 kg)   SpO2 96%   BMI 42.37 kg/m²   Physical Exam  Vitals signs and nursing note reviewed.    Constitutional:       General: He is not in acute distress. Appearance: He is well-developed. He is not ill-appearing. HENT:      Head: Normocephalic and atraumatic. Right Ear: External ear normal.      Left Ear: External ear normal.   Eyes:      General: No scleral icterus. Right eye: No discharge. Left eye: No discharge. Conjunctiva/sclera: Conjunctivae normal.      Pupils: Pupils are equal, round, and reactive to light. Neck:      Thyroid: No thyromegaly. Trachea: No tracheal deviation. Cardiovascular:      Rate and Rhythm: Normal rate and regular rhythm. Heart sounds: Normal heart sounds. Pulmonary:      Effort: Pulmonary effort is normal. No respiratory distress. Breath sounds: Normal breath sounds. No wheezing. Lymphadenopathy:      Cervical: No cervical adenopathy. Skin:     General: Skin is warm. Findings: No rash. Neurological:      Mental Status: He is alert and oriented to person, place, and time. Psychiatric:         Mood and Affect: Mood normal.         Behavior: Behavior normal.         Thought Content: Thought content normal.       Controlled Substance Monitoring:    Acute and Chronic Pain Monitoring:   RX Monitoring 12/2/2020   Attestation -   Acute Pain Prescriptions -   Periodic Controlled Substance Monitoring Possible medication side effects, risk of tolerance/dependence & alternative treatments discussed. ;No signs of potential drug abuse or diversion identified. Chronic Pain > 50 MEDD Obtained or confirmed \"Consent for Opioid Use\" on file. Chronic Pain > 80 MEDD -   Chronic Pain > 120 MEDD Obtained or confirmed \"Medication Contract\" on file. Assessment:       Diagnosis Orders   1. Annual physical exam  Basic Metabolic Panel, Fasting    Hepatic Function Panel   2. Vitamin D deficiency  Vitamin D 25 Hydroxy   3. Controlled type 2 diabetes mellitus without complication, without long-term current use of insulin (HCC)  POCT microalbumin   4.  Encounter for lipid screening for cardiovascular disease  Lipid, Fasting   5. Screening PSA (prostate specific antigen)  Psa screening   6. Muscle spasm  cyclobenzaprine (FLEXERIL) 10 MG tablet        Plan:    Blood work ordered to be done in January. Urine for microalbumin today. Add Flexeril to Percocet for his back pain. Patient to start his aquatic therapy. Return in about 6 weeks (around 1/13/2021) for DIABETES. Orders Placed This Encounter   Procedures    Lipid, Fasting     Standing Status:   Future     Standing Expiration Date:   2/2/2021    Basic Metabolic Panel, Fasting     Standing Status:   Future     Standing Expiration Date:   2/2/2021    Hepatic Function Panel     Standing Status:   Future     Standing Expiration Date:   2/2/2021    Psa screening     Standing Status:   Future     Standing Expiration Date:   2/2/2021    Vitamin D 25 Hydroxy     Standing Status:   Future     Standing Expiration Date:   2/2/2021    POCT microalbumin     Orders Placed This Encounter   Medications    gabapentin (NEURONTIN) 300 MG capsule     Sig: Take 1 capsule by mouth 4 times daily for 30 days. Dispense:  120 capsule     Refill:  5    cyclobenzaprine (FLEXERIL) 10 MG tablet     Sig: Take 1 tablet by mouth every 8 hours as needed for Muscle spasms     Dispense:  60 tablet     Refill:  1       Patient given educationalmaterials - see patient instructions. Discussed use, benefit, and side effectsof prescribed medications. All patient questions answered. Pt voiced understanding. Reviewed health maintenance. Instructed to continue current medications, diet andexercise. Patient agreed with treatment plan. Follow up as directed.      Electronicallysigned by Maggie Manley MD on 12/2/2020 at 4:28 PM

## 2020-12-21 RX ORDER — ATORVASTATIN CALCIUM 20 MG/1
TABLET, FILM COATED ORAL
Qty: 90 TABLET | Refills: 0 | Status: SHIPPED | OUTPATIENT
Start: 2020-12-21 | End: 2021-03-19

## 2020-12-21 RX ORDER — HYDROCHLOROTHIAZIDE 25 MG/1
TABLET ORAL
Qty: 30 TABLET | Refills: 0 | Status: SHIPPED | OUTPATIENT
Start: 2020-12-21 | End: 2021-01-18

## 2020-12-23 ENCOUNTER — HOSPITAL ENCOUNTER (EMERGENCY)
Age: 70
Discharge: HOME | End: 2020-12-23
Attending: EMERGENCY MEDICINE
Payer: COMMERCIAL

## 2020-12-23 ENCOUNTER — APPOINTMENT (OUTPATIENT)
Dept: CT IMAGING | Age: 70
End: 2020-12-23
Payer: COMMERCIAL

## 2020-12-23 ENCOUNTER — APPOINTMENT (OUTPATIENT)
Dept: GENERAL RADIOLOGY | Age: 70
End: 2020-12-23
Payer: COMMERCIAL

## 2020-12-23 VITALS
OXYGEN SATURATION: 96 % | HEIGHT: 67 IN | BODY MASS INDEX: 40.81 KG/M2 | TEMPERATURE: 99.2 F | RESPIRATION RATE: 20 BRPM | HEART RATE: 104 BPM | WEIGHT: 260 LBS | SYSTOLIC BLOOD PRESSURE: 166 MMHG | DIASTOLIC BLOOD PRESSURE: 79 MMHG

## 2020-12-23 PROCEDURE — 6360000002 HC RX W HCPCS: Performed by: EMERGENCY MEDICINE

## 2020-12-23 PROCEDURE — 71045 X-RAY EXAM CHEST 1 VIEW: CPT

## 2020-12-23 PROCEDURE — 96372 THER/PROPH/DIAG INJ SC/IM: CPT

## 2020-12-23 PROCEDURE — 99284 EMERGENCY DEPT VISIT MOD MDM: CPT

## 2020-12-23 PROCEDURE — 72125 CT NECK SPINE W/O DYE: CPT

## 2020-12-23 RX ORDER — OXYCODONE AND ACETAMINOPHEN 10; 325 MG/1; MG/1
1 TABLET ORAL EVERY 6 HOURS PRN
Qty: 120 TABLET | Refills: 0 | Status: SHIPPED | OUTPATIENT
Start: 2020-12-23 | End: 2021-01-28 | Stop reason: SDUPTHER

## 2020-12-23 RX ORDER — KETOROLAC TROMETHAMINE 30 MG/ML
30 INJECTION, SOLUTION INTRAMUSCULAR; INTRAVENOUS ONCE
Status: COMPLETED | OUTPATIENT
Start: 2020-12-23 | End: 2020-12-23

## 2020-12-23 RX ORDER — ORPHENADRINE CITRATE 30 MG/ML
60 INJECTION INTRAMUSCULAR; INTRAVENOUS ONCE
Status: COMPLETED | OUTPATIENT
Start: 2020-12-23 | End: 2020-12-23

## 2020-12-23 RX ORDER — PREDNISONE 10 MG/1
TABLET ORAL
Qty: 20 TABLET | Refills: 0 | Status: SHIPPED | OUTPATIENT
Start: 2020-12-23 | End: 2021-01-02

## 2020-12-23 RX ORDER — ORPHENADRINE CITRATE 30 MG/ML
60 INJECTION INTRAMUSCULAR; INTRAVENOUS ONCE
Status: DISCONTINUED | OUTPATIENT
Start: 2020-12-23 | End: 2020-12-23 | Stop reason: HOSPADM

## 2020-12-23 RX ADMIN — ORPHENADRINE CITRATE 60 MG: 60 INJECTION INTRAMUSCULAR; INTRAVENOUS at 15:31

## 2020-12-23 RX ADMIN — KETOROLAC TROMETHAMINE 30 MG: 30 INJECTION, SOLUTION INTRAMUSCULAR at 13:26

## 2020-12-23 ASSESSMENT — PAIN SCALES - GENERAL
PAINLEVEL_OUTOF10: 9
PAINLEVEL_OUTOF10: 9

## 2020-12-23 ASSESSMENT — PAIN DESCRIPTION - ORIENTATION: ORIENTATION: LEFT

## 2020-12-23 ASSESSMENT — PAIN DESCRIPTION - LOCATION: LOCATION: NECK;SHOULDER

## 2020-12-23 NOTE — ED PROVIDER NOTES
[address] [facility] ED  [address] [place] [address]  Phone: [phone]  Fax: [phone]      Pt Name: Alma Finch  Birthdate 1950  Date of evaluation: 12/23/2020      CHIEF COMPLAINT       Chief Complaint   Patient presents with    Neck Pain    Shoulder Pain       HISTORY OF PRESENT ILLNESS   65-year-old male presents to the emergency department today complaining of left-sided neck pain which radiates down to his left shoulder and his left arm. He has a history of chronic neck pain which is radicular in terms in nature going down to the left side. Move makes pain worse. Nothing makes pain better. He tells me it is acutely worse since yesterday morning. No known injury or trauma. No mitigating precipitating or exacerbating factors otherwise. REVIEW OF SYSTEMS     Review of Systems   All other systems reviewed and are negative. PAST MEDICAL HISTORY    has a past medical history of Arthritis, COPD (chronic obstructive pulmonary disease) ([facility] [address] Diabetes mellitus ([facility] [address] and GURPREET on CPAP. SURGICAL HISTORY      has a past surgical history that includes Appendectomy; Tonsillectomy; Cholecystectomy (25 years ago); Total knee arthroplasty (Left, 02/01/2017); joint replacement (Right, 11/07/2017); and lumbar laminectomy (10/24/2018). CURRENT MEDICATIONS       Previous Medications    ALBUTEROL SULFATE HFA (PROAIR HFA) 108 (90 BASE) MCG/ACT INHALER    Inhale 2 puffs into the lungs every 6 hours as needed for Wheezing    AMMONIUM LACTATE (LAC-HYDRIN) 12 % LOTION    Apply topically daily.     ATORVASTATIN (LIPITOR) 20 MG TABLET    TAKE 1 TABLET BY MOUTH EVERY DAY AT NIGHT    BENAZEPRIL (LOTENSIN) 20 MG TABLET    TAKE 1 TABLET BY MOUTH ONE TIME A DAY     BUDESONIDE-FORMOTEROL (SYMBICORT) 160-4.5 MCG/ACT AERO    Inhale 2 puffs into the lungs 2 times daily    CELECOXIB (CELEBREX) 200 MG CAPSULE    TAKE 1 CAPSULE BY MOUTH EVERY DAY    CICLOPIROX (LOPROX) 0.77 % CREAM    Apply topically 2 times daily. CRISABOROLE 2 % OINT    Apply 1 applicator topically 2 times daily    FLUOCINONIDE (LIDEX) 0.05 % CREAM    Apply topically 2 times daily. FLUOCINONIDE (LIDEX) 0.05 % CREAM    Apply topically 2 times daily as needed    GABAPENTIN (NEURONTIN) 300 MG CAPSULE    Take 1 capsule by mouth 4 times daily for 30 days. HYDROCHLOROTHIAZIDE (HYDRODIURIL) 25 MG TABLET    TAKE 1 TABLET BY MOUTH EVERY DAY     IPRATROPIUM-ALBUTEROL (DUONEB) 0.5-2.5 (3) MG/3ML SOLN NEBULIZER SOLUTION    Inhale 3 mLs into the lungs every 6 hours as needed for Shortness of Breath    METFORMIN (GLUCOPHAGE) 1000 MG TABLET    TAKE 1 TABLET BY MOUTH ONE TIME A DAY (WITH BREAKFAST)    OXYCODONE-ACETAMINOPHEN (PERCOCET)  MG PER TABLET    Take 1 tablet by mouth every 6 hours as needed for Pain for up to 30 days. TRAZODONE (DESYREL) 50 MG TABLET    Take 1 tablet by mouth nightly as needed for Sleep    VITAMIN D, ERGOCALCIFEROL, 2000 UNITS CAPS    Take 1 capsule by mouth daily       ALLERGIES     is allergic to cheratussin ac [guaifenesin-codeine]. FAMILY HISTORY     has no family status information on file. family history is not on file. SOCIAL HISTORY      reports that he has been smoking cigarettes. He has a 42.00 pack-year smoking history. He has never used smokeless tobacco. He reports that he does not drink alcohol or use drugs. PHYSICAL EXAM     INITIAL VITALS:  height is 5' 7\" (1.702 m) and weight is 117.9 kg (260 lb). His oral temperature is 99.2 °F (37.3 °C). His blood pressure is 166/79 (abnormal) and his pulse is 104. His respiration is 20 and oxygen saturation is 96%. Physical Exam  Vitals signs reviewed. Constitutional:       Appearance: He is well-developed. HENT:      Head: Normocephalic and atraumatic. Eyes:      Conjunctiva/sclera: Conjunctivae normal.      Pupils: Pupils are equal, round, and reactive to light.    Neck:      Musculoskeletal: Normal range of motion SYSTEM PROVIDED HISTORY: Left-sided chest pain TECHNOLOGIST PROVIDED HISTORY: Left-sided chest pain Reason for Exam: SOB Acuity: Chronic Type of Exam: Initial Additional signs and symptoms: cough Relevant Medical/Surgical History: Hx COPD Additional history of diabetes. FINDINGS: Apical lordotic view. Cardiac silhouette WNL for AP technique. Mediastinal structures midline with some calcification aortic knob. Probable mild blunting left costophrenic angle. Right costophrenic angle clear. No localized pulmonary opacity suspicious for infiltrate. Mild cephalization of blood flow Some DJD spine and left shoulder. Mild blunting left costophrenic angle, either chronic or reflecting small effusion. No consolidation or large pleural effusion. LABS:  No results found for this visit on 12/23/20. EMERGENCY DEPARTMENT COURSE:     Thepatient was given the following medications:  Orders Placed This Encounter   Medications    ketorolac (TORADOL) injection 30 mg    orphenadrine (NORFLEX) injection 60 mg    predniSONE (DELTASONE) 10 MG tablet     Sig: Take 4 tablets by mouth once daily for 5 days     Dispense:  20 tablet     Refill:  0        Vitals:    Vitals:    12/23/20 1222   BP: (!) 166/79   Pulse: 104   Resp: 20   Temp: 99.2 °F (37.3 °C)   TempSrc: Oral   SpO2: 96%   Weight: 117.9 kg (260 lb)   Height: 5' 7\" (1.702 m)     -------------------------  BP: (!) 166/79, Temp: 99.2 °F (37.3 °C), Pulse: 104, Resp: 20      Re-evaluation Notes  2:39 PM EST  Patient was concerned that this may be pleurisy. Chest x-ray was done which does show a small pleural effusion otherwise is negative. I feel that the pain is more radicular. He is already on muscle relaxers and narcotics at home. I will add steroids for a brief period of time with the knowledge that it will increase his blood sugar. CONSULTS:  None    CRITICAL CARE:   None    PROCEDURES:  None    FINAL IMPRESSION      1.  Cervical radiculopathy DISPOSITION/PLAN   DISPOSITION Decision To Discharge 12/23/2020 02:40:06 PM      Condition on Disposition  Good    PATIENT REFERRED TO:  dS Saleh.   [address] [facility] [address]  [phone]    Schedule an appointment as soon as possible for a visit in 2 days        DISCHARGE MEDICATIONS:  [unfilled]    (Please note that portions of this note were completed with a voice recognitionprogram.  Efforts were made to edit the dictations but occasionally words are mis-transcribed.)    Ezra Perez MD, F.A.C.E.P, F.A.A.E.M  Emergency Physician Attending         Ezra Perez MD  12/23/20 2535

## 2021-01-11 ENCOUNTER — HOSPITAL ENCOUNTER (OUTPATIENT)
Age: 71
Setting detail: SPECIMEN
Discharge: HOME | End: 2021-01-11
Payer: MEDICARE

## 2021-01-11 DIAGNOSIS — Z00.00 ANNUAL PHYSICAL EXAM: ICD-10-CM

## 2021-01-11 DIAGNOSIS — Z13.220 ENCOUNTER FOR LIPID SCREENING FOR CARDIOVASCULAR DISEASE: ICD-10-CM

## 2021-01-11 DIAGNOSIS — Z12.5 SCREENING PSA (PROSTATE SPECIFIC ANTIGEN): ICD-10-CM

## 2021-01-11 DIAGNOSIS — E55.9 VITAMIN D DEFICIENCY: ICD-10-CM

## 2021-01-11 DIAGNOSIS — Z13.6 ENCOUNTER FOR LIPID SCREENING FOR CARDIOVASCULAR DISEASE: ICD-10-CM

## 2021-01-11 LAB — PROSTATE SPECIFIC ANTIGEN: 0.41 UG/L

## 2021-01-12 LAB
ALBUMIN SERPL-MCNC: 4.1 G/DL (ref 3.5–5.2)
ALBUMIN/GLOBULIN RATIO: 1.2 (ref 1–2.5)
ALP BLD-CCNC: 82 U/L (ref 40–129)
ALT SERPL-CCNC: 21 U/L (ref 5–41)
ANION GAP SERPL CALCULATED.3IONS-SCNC: 16 MMOL/L (ref 9–17)
AST SERPL-CCNC: 18 U/L
BILIRUB SERPL-MCNC: 0.47 MG/DL (ref 0.3–1.2)
BILIRUBIN DIRECT: 0.08 MG/DL
BILIRUBIN, INDIRECT: 0.39 MG/DL (ref 0–1)
BUN BLDV-MCNC: 17 MG/DL (ref 8–23)
BUN/CREAT BLD: ABNORMAL (ref 9–20)
CALCIUM SERPL-MCNC: 10.1 MG/DL (ref 8.6–10.4)
CHLORIDE BLD-SCNC: 101 MMOL/L (ref 98–107)
CHOLESTEROL, FASTING: 90 MG/DL
CHOLESTEROL/HDL RATIO: 2.3
CO2: 23 MMOL/L (ref 20–31)
CREAT SERPL-MCNC: 0.95 MG/DL (ref 0.7–1.2)
GFR AFRICAN AMERICAN: >60 ML/MIN
GFR NON-AFRICAN AMERICAN: >60 ML/MIN
GFR SERPL CREATININE-BSD FRML MDRD: ABNORMAL ML/MIN/{1.73_M2}
GFR SERPL CREATININE-BSD FRML MDRD: ABNORMAL ML/MIN/{1.73_M2}
GLOBULIN: NORMAL G/DL (ref 1.5–3.8)
GLUCOSE FASTING: 116 MG/DL (ref 70–99)
HDLC SERPL-MCNC: 39 MG/DL
LDL CHOLESTEROL: 25 MG/DL (ref 0–130)
POTASSIUM SERPL-SCNC: 4.8 MMOL/L (ref 3.7–5.3)
SODIUM BLD-SCNC: 140 MMOL/L (ref 135–144)
TOTAL PROTEIN: 7.6 G/DL (ref 6.4–8.3)
TRIGLYCERIDE, FASTING: 131 MG/DL
VITAMIN D 25-HYDROXY: 32.8 NG/ML (ref 30–100)
VLDLC SERPL CALC-MCNC: ABNORMAL MG/DL (ref 1–30)

## 2021-01-13 ENCOUNTER — OFFICE VISIT (OUTPATIENT)
Dept: PRIMARY CARE CLINIC | Age: 71
End: 2021-01-13
Payer: COMMERCIAL

## 2021-01-13 VITALS
WEIGHT: 272.2 LBS | HEIGHT: 67 IN | OXYGEN SATURATION: 98 % | HEART RATE: 88 BPM | DIASTOLIC BLOOD PRESSURE: 74 MMHG | TEMPERATURE: 97.1 F | BODY MASS INDEX: 42.72 KG/M2 | SYSTOLIC BLOOD PRESSURE: 126 MMHG

## 2021-01-13 DIAGNOSIS — E66.01 OBESITY, MORBID, BMI 40.0-49.9 (HCC): ICD-10-CM

## 2021-01-13 DIAGNOSIS — M62.838 MUSCLE SPASM: ICD-10-CM

## 2021-01-13 DIAGNOSIS — E11.9 CONTROLLED TYPE 2 DIABETES MELLITUS WITHOUT COMPLICATION, WITHOUT LONG-TERM CURRENT USE OF INSULIN (HCC): ICD-10-CM

## 2021-01-13 DIAGNOSIS — Z00.00 ROUTINE GENERAL MEDICAL EXAMINATION AT A HEALTH CARE FACILITY: Primary | ICD-10-CM

## 2021-01-13 DIAGNOSIS — J44.9 CHRONIC OBSTRUCTIVE PULMONARY DISEASE, UNSPECIFIED COPD TYPE (HCC): ICD-10-CM

## 2021-01-13 PROBLEM — S32.049A CLOSED FRACTURE OF FOURTH LUMBAR VERTEBRA (HCC): Status: RESOLVED | Noted: 2019-06-13 | Resolved: 2021-01-13

## 2021-01-13 PROBLEM — M54.10 RADICULAR LEG PAIN: Status: RESOLVED | Noted: 2019-06-13 | Resolved: 2021-01-13

## 2021-01-13 LAB — HBA1C MFR BLD: 6 %

## 2021-01-13 PROCEDURE — G8484 FLU IMMUNIZE NO ADMIN: HCPCS | Performed by: FAMILY MEDICINE

## 2021-01-13 PROCEDURE — 1123F ACP DISCUSS/DSCN MKR DOCD: CPT | Performed by: FAMILY MEDICINE

## 2021-01-13 PROCEDURE — 4040F PNEUMOC VAC/ADMIN/RCVD: CPT | Performed by: FAMILY MEDICINE

## 2021-01-13 PROCEDURE — 3017F COLORECTAL CA SCREEN DOC REV: CPT | Performed by: FAMILY MEDICINE

## 2021-01-13 PROCEDURE — G0439 PPPS, SUBSEQ VISIT: HCPCS | Performed by: FAMILY MEDICINE

## 2021-01-13 PROCEDURE — 3044F HG A1C LEVEL LT 7.0%: CPT | Performed by: FAMILY MEDICINE

## 2021-01-13 PROCEDURE — 83036 HEMOGLOBIN GLYCOSYLATED A1C: CPT | Performed by: FAMILY MEDICINE

## 2021-01-13 RX ORDER — GABAPENTIN 300 MG/1
300 CAPSULE ORAL 4 TIMES DAILY
Qty: 120 CAPSULE | Refills: 5 | Status: SHIPPED | OUTPATIENT
Start: 2021-01-13 | End: 2022-02-01

## 2021-01-13 RX ORDER — BUDESONIDE AND FORMOTEROL FUMARATE DIHYDRATE 160; 4.5 UG/1; UG/1
2 AEROSOL RESPIRATORY (INHALATION) 2 TIMES DAILY
Qty: 10.2 G | Refills: 5 | Status: SHIPPED | OUTPATIENT
Start: 2021-01-13 | End: 2022-02-01 | Stop reason: SDUPTHER

## 2021-01-13 RX ORDER — CYCLOBENZAPRINE HCL 10 MG
10 TABLET ORAL EVERY 8 HOURS PRN
Qty: 60 TABLET | Refills: 1 | Status: SHIPPED | OUTPATIENT
Start: 2021-01-13 | End: 2021-02-02

## 2021-01-13 ASSESSMENT — PATIENT HEALTH QUESTIONNAIRE - PHQ9
SUM OF ALL RESPONSES TO PHQ QUESTIONS 1-9: 0
SUM OF ALL RESPONSES TO PHQ QUESTIONS 1-9: 0
SUM OF ALL RESPONSES TO PHQ9 QUESTIONS 1 & 2: 0
1. LITTLE INTEREST OR PLEASURE IN DOING THINGS: 0

## 2021-01-13 NOTE — PATIENT INSTRUCTIONS
Personalized Preventive Plan for Monica Menchaca Sr. - 1/13/2021  Medicare offers a range of preventive health benefits. Some of the tests and screenings are paid in full while other may be subject to a deductible, co-insurance, and/or copay. Some of these benefits include a comprehensive review of your medical history including lifestyle, illnesses that may run in your family, and various assessments and screenings as appropriate. After reviewing your medical record and screening and assessments performed today your provider may have ordered immunizations, labs, imaging, and/or referrals for you. A list of these orders (if applicable) as well as your Preventive Care list are included within your After Visit Summary for your review. Other Preventive Recommendations:    · A preventive eye exam performed by an eye specialist is recommended every 1-2 years to screen for glaucoma; cataracts, macular degeneration, and other eye disorders. · A preventive dental visit is recommended every 6 months. · Try to get at least 150 minutes of exercise per week or 10,000 steps per day on a pedometer . · Order or download the FREE \"Exercise & Physical Activity: Your Everyday Guide\" from [employer]. Call 9-795.639.8930 or search [employer] online. · You need 9096-4127 mg of calcium and 6388-6645 IU of vitamin D per day. It is possible to meet your calcium requirement with diet alone, but a vitamin D supplement is usually necessary to meet this goal.  · When exposed to the sun, use a sunscreen that protects against both UVA and UVB radiation with an SPF of 30 or greater. Reapply every 2 to 3 hours or after sweating, drying off with a towel, or swimming. · Always wear a seat belt when traveling in a car. Always wear a helmet when riding a bicycle or motorcycle.

## 2021-01-13 NOTE — PROGRESS NOTES
Medicare Annual Wellness Visit  Name: Lisseth Mojica EFUJEM Date: 2021   MRN: [id number] Sex: Male   Age: 69 y.o. Ethnicity: Non-/Non    : 1950 Race: Kirill Wiggins Sr. is here for Medicare AWV    Screenings for behavioral, psychosocial and functional/safety risks, and cognitive dysfunction are all negative except as indicated below. These results, as well as other patient data from the [address] form, are documented in Flowsheets linked to this Encounter. Allergies   Allergen Reactions    Cheratussin Ac [Guaifenesin-Codeine] Other (See Comments)     Coughing Attack. Prior to Visit Medications    Medication Sig Taking? Authorizing Provider   vitamin D 25 MCG (1000 UT) CAPS Take 1,000 Units by mouth daily Yes Historical Provider, MD   oxyCODONE-acetaminophen (PERCOCET)  MG per tablet Take 1 tablet by mouth every 6 hours as needed for Pain for up to 30 days. Yes Efren Berman MD   atorvastatin (LIPITOR) 20 MG tablet TAKE 1 TABLET BY MOUTH EVERY DAY AT NIGHT Yes Efren Berman MD   hydroCHLOROthiazide (HYDRODIURIL) 25 MG tablet TAKE 1 TABLET BY MOUTH EVERY DAY  Yes Efren Berman MD   celecoxib (CELEBREX) 200 MG capsule TAKE 1 CAPSULE BY MOUTH EVERY DAY Yes Historical Provider, MD   fluocinonide (LIDEX) 0.05 % cream Apply topically 2 times daily as needed Yes Efren Berman MD   ammonium lactate (LAC-HYDRIN) 12 % lotion Apply topically daily.  Yes Efren Berman MD   metFORMIN (GLUCOPHAGE) 1000 MG tablet TAKE 1 TABLET BY MOUTH ONE TIME A DAY (WITH BREAKFAST) Yes Efren Berman MD   budesonide-formoterol [facility]) 160-4.5 MCG/ACT AERO Inhale 2 puffs into the lungs 2 times daily Yes Efren Berman MD   albuterol sulfate HFA (PROAIR HFA) 108 (90 Base) MCG/ACT inhaler Inhale 2 puffs into the lungs every 6 hours as needed for Wheezing Yes Efren Berman MD   ciclopirox (LOPROX) 0.77 % cream Apply topically 2 times daily. Yes Sarika Valderrama MD   Crisaborole 2 % OINT Apply 1 applicator topically 2 times daily Yes Nikhil Jung MD   fluocinonide (LIDEX) 0.05 % cream Apply topically 2 times daily. Yes Sarika Valderrama MD   traZODone (DESYREL) 50 MG tablet Take 1 tablet by mouth nightly as needed for Sleep Yes Sarika Valderrama MD   benazepril (LOTENSIN) 20 MG tablet TAKE 1 TABLET BY MOUTH ONE TIME A DAY  Yes Sarika Valderrama MD   ipratropium-albuterol (DUONEB) 0.5-2.5 (3) MG/3ML SOLN nebulizer solution Inhale 3 mLs into the lungs every 6 hours as needed for Shortness of Breath Yes Sarika Valderrama MD   Vitamin D, Ergocalciferol, 2000 units CAPS Take 1 capsule by mouth daily Yes Sarika Valderrama MD   gabapentin (NEURONTIN) 300 MG capsule Take 1 capsule by mouth 4 times daily for 30 days. Sarika Valderrama MD       Past Medical History:   Diagnosis Date    Arthritis     COPD [facility]ronic obstructive [facility] disease) [facility]     Diabetes mellitus [facility]     GURPREET on CPAP        Past Surgical History:   Procedure Laterality Date    APPENDECTOMY      CHOLECYSTECTOMY  25 years ago    JOINT REPLACEMENT Right 11/07/2017    LUMBAR LAMINECTOMY  10/24/2018    L2-L5 laminectomy, foraminotomy    TONSILLECTOMY      TOTAL KNEE ARTHROPLASTY Left 02/01/2017       No family history on file. CareTeam (Including outside providers/suppliers regularly involved in providing care):   Patient Care Team:  Sarika Valderrama MD as PCP - General (Family Medicine)  Sarika Valderrama MD as PCP - REHABILITATION [facility] Provider    Wt Readings from Last 3 Encounters:   01/13/21 272 lb 3.2 oz (123.5 kg)   12/23/20 260 lb (117.9 kg)   12/02/20 270 lb 3.2 oz (122.6 kg)     Vitals:    01/13/21 1104   BP: 126/74   Pulse: 88   Temp: 97.1 °F (36.2 °C)   SpO2: 98%   Weight: 272 lb 3.2 oz (123.5 kg)   Height: 5' 6.96\" (1.701 m)     Body mass index is 42.68 kg/m².     Based upon direct observation of the patient, evaluation of cognition reveals recent and remote memory intact. General Appearance: alert and oriented to person, place and time, well developed and well- nourished, in no acute distress  Skin: warm and dry, no rash or erythema  Head: normocephalic and atraumatic  Eyes: pupils equal, round, and reactive to light, extraocular eye movements intact, conjunctivae normal  ENT: tympanic membrane, external ear and ear canal normal bilaterally, nose without deformity, nasal mucosa and turbinates normal without polyps  Neck: supple and non-tender without mass, no thyromegaly or thyroid nodules, no cervical lymphadenopathy  Pulmonary/Chest: clear to auscultation bilaterally- no wheezes, rales or rhonchi, normal air movement, no respiratory distress  Cardiovascular: normal rate, regular rhythm, normal S1 and S2, no murmurs, rubs, clicks, or gallops, distal pulses intact, no carotid bruits  Abdomen: soft, non-tender, non-distended, normal bowel sounds, no masses or organomegaly  Extremities: no cyanosis, clubbing or edema  Musculoskeletal: normal range of motion, no joint swelling, deformity or tenderness  Neurologic: reflexes normal and symmetric, no cranial nerve deficit, gait, coordination and speech normal    Patient's complete Health Risk Assessment and screening values have been reviewed and are found in Flowsheets. The following problems were reviewed today and where indicated follow up appointments were made and/or referrals ordered.     Positive Risk Factor Screenings with Interventions:         Substance History:  Social History     Tobacco History     Smoking Status  Current Every Day Smoker Last attempt to quit  10/12/2017 Smoking Frequency  1 pack/day for 42 years (42 pk yrs) Smoking Tobacco Type  Cigarettes    Smokeless Tobacco Use  Never Used          Alcohol History     Alcohol Use Status  No          Drug Use     Drug Use Status  No          Sexual Activity     Sexually Active  Not Asked               Alcohol Screening:       A score of 8 or more is associated with harmful or hazardous drinking. A score of 13 or more in women, and 15 or more in men, is likely to indicate alcohol dependence.   Substance Abuse Interventions:  · Tobacco abuse:  patient is not ready to work toward tobacco cessation at this time    [employer] and ACP:     [address] [facility] [address] Will ACP-Advance Directive ACP-Power of     Not on File Not on File Not on File Not on [address] Interventions:  · Has living will     Health Habits/Nutrition:     Body mass index: (!) 42.68  Health Habits/Nutrition Interventions:  · Up to date       Personalized Preventive Plan   Current Health Maintenance Status  Immunization History   Administered Date(s) Administered    Influenza Virus Vaccine 09/28/2017    Influenza, High Dose (Fluzone 65 yrs and older) 09/01/2017, 11/06/2018, 12/04/2019    Influenza, Quadv, adjuvanted, 65 yrs +, IM, PF (Fluad) 09/23/2020    Pneumococcal Conjugate 13-valent ([id number]) 01/05/2016    Pneumococcal Polysaccharide ([id number]) 12/22/2014    Tdap (Boostrix, Adacel) 07/01/2010, 07/27/2017        Health Maintenance   Topic Date Due    Diabetic retinal exam  03/08/1959    Shingles Vaccine (1 of 2) 03/08/1999    Annual Wellness Visit (AWV)  12/02/2020    Diabetic foot exam  06/10/2021    Low dose CT lung screening  06/23/2021    A1C test (Diabetic or Prediabetic)  09/23/2021    Diabetic microalbuminuria test  12/02/2021    Lipid screen  01/11/2022    Potassium monitoring  01/11/2022    Creatinine monitoring  01/11/2022    Colon cancer screen fecal DNA test (Cologuard)  07/12/2023    DTaP/Tdap/Td vaccine (3 - Td) 07/27/2027    Flu vaccine  Completed    Pneumococcal 65+ years Vaccine  Completed    AAA screen  Completed    Hepatitis C screen  Completed    Hepatitis A vaccine  Aged Out    Hib vaccine  Aged Out    Meningococcal (ACWY) vaccine  Aged Out     Recommendations for Preventive Services Due: see orders and patient instructions/AVS.  . Recommended screening schedule for the next 5-10 years is provided to the patient in written form: see Patient Lester Seo was seen today for medicare awv. Diagnoses and all orders for this visit:    Chronic obstructive pulmonary disease, unspecified COPD type ([facility]  Stable. Patient once again encouraged to quit smoking. Continue with inhalers as is. Obesity, morbid, BMI 40.0-49.9 ([facility] [address] [facility]  Weight loss options discussed. Patient states is not very interested at this point. Controlled type 2 diabetes mellitus without complication, without long-term current use of insulin ([facility]  Well-controlled. A1c reviewed. Continue present medications as is. Controlled Substance Monitoring:    Acute and Chronic Pain Monitoring:   RX Monitoring 1/13/2021   Attestation -   Acute Pain Prescriptions -   Periodic Controlled Substance Monitoring Possible medication side effects, risk of tolerance/dependence & alternative treatments discussed. ;No signs of potential drug abuse or diversion identified. Chronic Pain > 50 MEDD Obtained or confirmed \"Consent for Opioid Use\" on file. Chronic Pain > 80 MEDD -   Chronic Pain > 120 MEDD Obtained or confirmed \"Medication Contract\" on file. Flexeril per patient request for back spasms. States physical therapy had made it worse. Encouraged to quit smoking. Renew medications.

## 2021-01-14 ENCOUNTER — APPOINTMENT (OUTPATIENT)
Dept: GENERAL RADIOLOGY | Age: 71
End: 2021-01-14
Payer: COMMERCIAL

## 2021-01-14 ENCOUNTER — HOSPITAL ENCOUNTER (EMERGENCY)
Age: 71
Discharge: HOME | End: 2021-01-14
Attending: EMERGENCY MEDICINE
Payer: COMMERCIAL

## 2021-01-14 VITALS
SYSTOLIC BLOOD PRESSURE: 158 MMHG | DIASTOLIC BLOOD PRESSURE: 83 MMHG | OXYGEN SATURATION: 95 % | HEIGHT: 67 IN | RESPIRATION RATE: 20 BRPM | TEMPERATURE: 98.4 F | HEART RATE: 97 BPM | WEIGHT: 260 LBS | BODY MASS INDEX: 40.81 KG/M2

## 2021-01-14 DIAGNOSIS — M62.838 SPASM OF MUSCLE: Primary | ICD-10-CM

## 2021-01-14 DIAGNOSIS — M54.50 CHRONIC RIGHT-SIDED LOW BACK PAIN WITHOUT SCIATICA: ICD-10-CM

## 2021-01-14 DIAGNOSIS — G89.29 CHRONIC RIGHT-SIDED LOW BACK PAIN WITHOUT SCIATICA: ICD-10-CM

## 2021-01-14 PROCEDURE — 6360000002 HC RX W HCPCS: Performed by: PHYSICIAN ASSISTANT

## 2021-01-14 PROCEDURE — 72100 X-RAY EXAM L-S SPINE 2/3 VWS: CPT

## 2021-01-14 PROCEDURE — 96372 THER/PROPH/DIAG INJ SC/IM: CPT

## 2021-01-14 PROCEDURE — 99283 EMERGENCY DEPT VISIT LOW MDM: CPT

## 2021-01-14 RX ORDER — KETOROLAC TROMETHAMINE 30 MG/ML
30 INJECTION, SOLUTION INTRAMUSCULAR; INTRAVENOUS ONCE
Status: COMPLETED | OUTPATIENT
Start: 2021-01-14 | End: 2021-01-14

## 2021-01-14 RX ORDER — ORPHENADRINE CITRATE 100 MG/1
100 TABLET, EXTENDED RELEASE ORAL 2 TIMES DAILY
Qty: 14 TABLET | Refills: 0 | Status: SHIPPED | OUTPATIENT
Start: 2021-01-14 | End: 2021-04-28 | Stop reason: SDUPTHER

## 2021-01-14 RX ADMIN — KETOROLAC TROMETHAMINE 30 MG: 30 INJECTION, SOLUTION INTRAMUSCULAR at 14:17

## 2021-01-14 ASSESSMENT — PAIN SCALES - GENERAL
PAINLEVEL_OUTOF10: 8
PAINLEVEL_OUTOF10: 8
PAINLEVEL_OUTOF10: 5

## 2021-01-14 ASSESSMENT — PAIN DESCRIPTION - PROGRESSION: CLINICAL_PROGRESSION: GRADUALLY IMPROVING

## 2021-01-14 ASSESSMENT — PAIN DESCRIPTION - PAIN TYPE: TYPE: CHRONIC PAIN

## 2021-01-14 ASSESSMENT — PAIN DESCRIPTION - LOCATION: LOCATION: BACK

## 2021-01-14 NOTE — ED PROVIDER NOTES
[address] [facility] ED  [address] [facility] [address] [facility] [address]  Phone: [phone]  Fax: [phone] [facility] Name: Lane Muñiz. MRN: [id number]  Birthdate 1950  Date of evaluation: 1/14/2021  Provider: Christiane Jernigan PA-C    CHIEF COMPLAINT       Chief Complaint   Patient presents with    Back Pain     low back pain since monday           HISTORY OF PRESENT ILLNESS  (Location/Symptom, Timing/Onset, Context/Setting, Quality, Duration, Modifying Factors, Severity.)   Lane Muñiz. is a 69 y.o. male who presents to the emergency department for BACK PAIN complaint:     Context/timeframe: Patient here for evaluation of back pain recurrence that is been going on over the last 2 days. Patient states that it is consistent in character and location to previous chronic back pain flareups. Patient states he goes to physical therapy approximately twice a week. Patient states that he did do some new stretching on Monday but at the time there was no specific increase in pain while at therapy itself. Patient denies any significant radiculopathy or any bladder or bowel incontinence. Patient was at his PCPs office yesterday where they added some Flexeril to his home regimen of gabapentin and oxycodone 10 mg every 6 hours. Patient denies any associated new chest/respiratory/abdominal/urinary pains or symptoms. Patient confirms he has a Pain Management appointment tomorrow already scheduled. Chronic  Neck/back pain:   YES  Location/Symptom:  Lower Back, R ight   Sciatica? NO  Spasms? YES  New rash? NO  Bladder/bowel incontinence? NO  Paresthesias? NO  Abdominal Pain? NO  Hx AAA or 1* relative? NO  Dysuria? NO  Quality: Aching  Duration: Persistent  Modifying Factors: Worse with bending and twisting  Severity:  Moderate    Nursing Notes were reviewed.     REVIEW OF SYSTEMS    (2-9 systems for level 4, 10 or more for level 5)     Review of Systems Constitutional: Negative. HENT: Negative. Eyes: Negative. Respiratory: Negative. Cardiovascular: Negative. Gastrointestinal: Negative. Musculoskeletal: Back pain   Endocrine: Negative. Genitourinary: Negative. Skin: Negative. Allergic/Immunologic: Negative. Neurological: Negative. Hematological: Negative. Psychiatric/Behavioral: Negative. Except as noted above the remainder of the review of systems was reviewed and negative. PAST MEDICAL HISTORY         Diagnosis Date    Arthritis     COPD (chronic obstructive pulmonary disease) ([facility]     Diabetes mellitus ([facility]     GURPREET on CPAP        SURGICAL HISTORY           Procedure Laterality Date    APPENDECTOMY  1952    CHOLECYSTECTOMY, LAPAROSCOPIC  1996    JOINT REPLACEMENT Right 11/07/2017    LUMBAR LAMINECTOMY  10/24/2018    L2-L5 laminectomy, foraminotomy    TONSILLECTOMY  1959    TOTAL KNEE ARTHROPLASTY Left 02/01/2017       CURRENT MEDICATIONS       Previous Medications    ALBUTEROL SULFATE HFA (PROAIR HFA) 108 (90 BASE) MCG/ACT INHALER    Inhale 2 puffs into the lungs every 6 hours as needed for Wheezing    AMMONIUM LACTATE (LAC-HYDRIN) 12 % LOTION    Apply topically daily. ATORVASTATIN (LIPITOR) 20 MG TABLET    TAKE 1 TABLET BY MOUTH EVERY DAY AT NIGHT    BENAZEPRIL (LOTENSIN) 20 MG TABLET    TAKE 1 TABLET BY MOUTH ONE TIME A DAY     BUDESONIDE-FORMOTEROL (SYMBICORT) 160-4.5 MCG/ACT AERO    Inhale 2 puffs into the lungs 2 times daily    CELECOXIB (CELEBREX) 200 MG CAPSULE    TAKE 1 CAPSULE BY MOUTH EVERY DAY    CICLOPIROX (LOPROX) 0.77 % CREAM    Apply topically 2 times daily. CRISABOROLE 2 % OINT    Apply 1 applicator topically 2 times daily    CYCLOBENZAPRINE (FLEXERIL) 10 MG TABLET    Take 1 tablet by mouth every 8 hours as needed for Muscle spasms    GABAPENTIN (NEURONTIN) 300 MG CAPSULE    Take 1 capsule by mouth 4 times daily for 30 days.     HYDROCHLOROTHIAZIDE (HYDRODIURIL) 25 MG TABLET    TAKE 1 TABLET BY MOUTH EVERY DAY     IPRATROPIUM-ALBUTEROL (DUONEB) 0.5-2.5 (3) MG/3ML SOLN NEBULIZER SOLUTION    Inhale 3 mLs into the lungs every 6 hours as needed for Shortness of Breath    METFORMIN (GLUCOPHAGE) 1000 MG TABLET    Take 1 tablet by mouth 2 times daily (with meals)    OXYCODONE-ACETAMINOPHEN (PERCOCET)  MG PER TABLET    Take 1 tablet by mouth every 6 hours as needed for Pain for up to 30 days. TRAZODONE (DESYREL) 50 MG TABLET    Take 1 tablet by mouth nightly as needed for Sleep    VITAMIN D 25 MCG (1000 UT) CAPS    Take 2 capsules by mouth daily       ALLERGIES     Cheratussin ac [guaifenesin-codeine]    FAMILY HISTORY     History reviewed. No pertinent family history. No family status information on file. SOCIAL HISTORY      reports that he has been smoking cigarettes. He has a 42.00 pack-year smoking history. He has never used smokeless tobacco. He reports that he does not drink alcohol or use drugs. PHYSICAL EXAM    (up to 7 for level 4, 8 or more for level 5)     ED Triage Vitals   BP Temp Temp src Pulse Resp SpO2 Height Weight   -- -- -- -- -- -- -- --       Physical Exam   Nursing note and vitals reviewed. Constitutional: Well-developed, well-nourished  Head: Normocephalic and atraumatic. Ear: External ears normal.   Nose: Nose normal and midline. Eyes: Conjunctivae and EOM are normal. Pupils are equal, round  Neck: Normal range of motion. Neck supple, no midline TTP. Cardiovascular: Normal rate, regular rhythm, normal heart sounds  Pulmonary/Chest: Effort normal and breath sounds normal. No respiratory distress. No wheezes. No rales. No chest tenderness. Abdominal: Soft. Bowel sounds are normal. No distension and no mass. There is no tenderness. There is no rebound and no guarding. No pulsatile mass. Musculoskeletal: Right lumbar paraspinal > midline back tenderness. No deficit of ROM but some mild pain exhibited with movement. .  Old midline lumbar surgical incision.   No step-off deformity, no swelling, no bruising. No saddle paresthesias. NV intact distally x4. Neurological: Alert & age appropriate mentation/interaction   Skin: Skin is warm and dry. No vesicular rash noted. Psychiatric: Mood, memory, affect and judgment normal.           DIAGNOSTIC RESULTS     RADIOLOGY:   Non-plain film images such as CT, Ultrasound and MRI are read by the radiologist. Plain radiographic images are visualized and preliminarily interpreted by the emergency physician with the below findings:    Interpretation per the Radiologist below, if available at the time of this note:    XR LUMBAR SPINE (2-3 VIEWS)   Final Result   No acute osseous abnormality in the lumbar spine. Severe L2-L3 and moderate L3-L4 degenerative disc disease. Not indicated    LABS:  Labs Reviewed - No data to display  Not indicated    All other labs were within normal range or not returned as of this dictation. EMERGENCY DEPARTMENT COURSE and DIFFERENTIAL DIAGNOSIS/MDM:   Vitals:    Vitals:    01/14/21 1327   BP: (!) 158/83   Pulse: 97   Resp: 20   Temp: 98.4 °F (36.9 °C)   TempSrc: Oral   SpO2: 95%   Weight: 117.9 kg (260 lb)   Height: 5' 7\" (1.702 m)       1351  Patient here for recurrence of chronic lower back pain. There is no acute issues on exam or by history that warrant additional work work-up. I will get some plain films of the lumbar spine as this has not been imaged in approximately a year and a half. Patient was at his PCPs office yesterday, he was at his Neurologist approximately 9 days ago and he has a pain management appointment tomorrow already scheduled. Toradol IM ordered. CONSULTS:  None    PROCEDURES:  None    1445  No acute changes on imaging and no new deficits on our exam.  He reports some improvement of pain after Toradol IM. I am replacing his Flexeril with Norflex to see if helps more so than Flexeril. He is to stop Flexeril immediately.   He has Pain Mgmt appt tomorrow. The patient presents with low back pain without signs of spinal cord compression, cauda equina syndrome, infection, aneurysm or other serious etiology. The patient is neurologically intact. Given the extremely low risk of these diagnoses further testing and evaluation for these possibilities does not appear to be indicated at this time. The patient has been instructed to return if the symptoms worsen or change in any way. I have reviewed the disposition diagnosis with the patient and or their family/guardian. I have answered their questions and given discharge instructions. They voiced understanding of these instructions and did not have any further questions or complaints. FINAL IMPRESSION      1. Spasm of muscle    2. Chronic right-sided low back pain without sciatica          DISPOSITION/PLAN   DISPOSITION Decision To Discharge    PATIENT REFERRED TO:  [facility]. [address] [place] [address]  [phone]    Schedule an appointment as soon as possible for a visit in 3 days  for re-evaluation of your symptoms    [facility] ED  [address] [facility].   [address] [place] [address]  [phone]  Go to   muscle weakness, numbness of groin or incontinence      DISCHARGE MEDICATIONS:  New Prescriptions    ORPHENADRINE (NORFLEX) 100 MG EXTENDED RELEASE TABLET    Take 1 tablet by mouth 2 times daily       (Please note that portions of this note were completed with a voice recognition program.  Efforts were made to edit the dictations but occasionally words are mis-transcribed.)    BAO Combs PA-C  01/14/21 3618

## 2021-01-14 NOTE — ED PROVIDER NOTES
Emergency Department         COMPLAINT       Chief Complaint   Patient presents with    Back Pain     low back pain since monday      PHYSICAL EXAM      ED Triage Vitals [01/14/21 1327]   BP Temp Temp Source Pulse Resp SpO2 Height Weight   (!) 158/83 98.4 °F (36.9 °C) Oral 97 20 95 % 5' 7\" (1.702 m) 260 lb (117.9 kg)         Constitutional: Alert, oriented x3, nontoxic, answering questions appropriately, acting properly for age, in no acute distress   HEENT: Extraocular muscles intact,   Neck: Trachea midline   Cardiovascular: Regular rhythm and rate no S3, S4, or murmurs   Respiratory: Clear to auscultation bilaterally no wheezes, rhonchi, rales, no respiratory distress no tachypnea no retractions no hypoxia  Gastrointestinal: Soft, obese, nondistended, positive bowel sounds. No rebound, rigidity, or guarding. Musculoskeletal: No extremity pain chronic lower extremity swelling midline\palpation positive decreased range of motion  Neurologic: Moving all 4 extremities without difficulty there are no gross focal neurologic deficits   Skin: Warm and dry       Physical Exam  DIAGNOSTIC RESULTS     EKG: All EKG's are interpreted by the Emergency Department Physician who either signs or Co-signs this chart in the absence of a cardiologist.    Not indicated unless otherwise documented above or in the midlevel documentation    LABS:  No results found for this visit on 01/14/21. Not indicated unless otherwise documented above or in the midlevel documentation    RADIOLOGY:   I reviewedthe radiologist interpretations:  XR LUMBAR SPINE (2-3 VIEWS)   Final Result   No acute osseous abnormality in the lumbar spine. Severe L2-L3 and moderate L3-L4 degenerative disc disease. Not indicated unless otherwise documented above or in the midlevel documentation    EMERGENCY DEPARTMENT COURSE:       PERTINENT ATTENDING PHYSICIAN COMMENTS:    Acute exacerbation of chronic low back pain.   Seen by his neurologist about a week and a half ago. Seen by his family physician yesterday and scheduled to see pain management tomorrow. Denies loss of bowel or bladder control no new injuries. CT of the lumbar spine done a couple years ago. Lumbar spine x-rays and pain control. Patient states that he may have injured it this past Monday when he was at physical therapy because he woke up Tuesday very sore. No signs of cauda equina syndrome. 2:15 PM x-ray shows significant degenerative changes in the lumbar spine no fracture. Patient has an appointment with pain management tomorrow. There has been discussion about a repeat ablation which she will discuss with them tomorrow. No gross focal neurologic deficits no cauda equina syndrome will discharge. Faculty Attestation    I performed a history and physical examination of the patient and discussed management with the mid level provideer. I reviewed the mid level provider's note and agree with the documented findings and plan of care. Any areas of disagreement are noted on the chart. I was personally present for the key portions of any procedures. I have documented in the chart those procedures where I was not present during the key portions. I have reviewed the emergency nurses triage note. I agree with the chief complaint, past medical history, past surgical history, allergies, medications, social and family history as documented unless otherwise noted below. Documentation of the HPI, Physical Exam and Medical Decision Making performed by medical students or scribes is based on my personal performance of the HPI, PE and MDM. For Physician Assistant/ Nurse Practitioner cases/documentation I have personally evaluated this patient and have completed at least one if not all key elements of the E/M (history, physical exam, and MDM). Additional findings are as noted.        Bj Haji DO  01/14/21 1673

## 2021-01-17 DIAGNOSIS — J44.9 CHRONIC OBSTRUCTIVE PULMONARY DISEASE, UNSPECIFIED COPD TYPE (HCC): ICD-10-CM

## 2021-01-17 DIAGNOSIS — F51.01 PRIMARY INSOMNIA: ICD-10-CM

## 2021-01-17 RX ORDER — HYDROCHLOROTHIAZIDE 25 MG/1
TABLET ORAL
Qty: 30 TABLET | Refills: 0 | Status: CANCELLED | OUTPATIENT
Start: 2021-01-17

## 2021-01-18 RX ORDER — TRAZODONE HYDROCHLORIDE 50 MG/1
50 TABLET ORAL NIGHTLY PRN
Qty: 90 TABLET | Refills: 0 | Status: SHIPPED | OUTPATIENT
Start: 2021-01-18 | End: 2021-04-19

## 2021-01-18 RX ORDER — HYDROCHLOROTHIAZIDE 25 MG/1
TABLET ORAL
Qty: 30 TABLET | Refills: 0 | Status: SHIPPED | OUTPATIENT
Start: 2021-01-18 | End: 2021-02-17

## 2021-01-28 DIAGNOSIS — M54.50 ACUTE MIDLINE LOW BACK PAIN WITHOUT SCIATICA: ICD-10-CM

## 2021-01-28 RX ORDER — OXYCODONE AND ACETAMINOPHEN 10; 325 MG/1; MG/1
1 TABLET ORAL EVERY 6 HOURS PRN
Qty: 120 TABLET | Refills: 0 | Status: SHIPPED | OUTPATIENT
Start: 2021-01-28 | End: 2021-03-09 | Stop reason: SDUPTHER

## 2021-02-16 DIAGNOSIS — J44.9 CHRONIC OBSTRUCTIVE PULMONARY DISEASE, UNSPECIFIED COPD TYPE (HCC): ICD-10-CM

## 2021-02-16 DIAGNOSIS — E11.9 CONTROLLED TYPE 2 DIABETES MELLITUS WITHOUT COMPLICATION, WITHOUT LONG-TERM CURRENT USE OF INSULIN (HCC): ICD-10-CM

## 2021-02-17 RX ORDER — HYDROCHLOROTHIAZIDE 25 MG/1
TABLET ORAL
Qty: 30 TABLET | Refills: 0 | Status: SHIPPED | OUTPATIENT
Start: 2021-02-17 | End: 2021-03-19

## 2021-03-08 DIAGNOSIS — M54.50 ACUTE MIDLINE LOW BACK PAIN WITHOUT SCIATICA: ICD-10-CM

## 2021-03-09 RX ORDER — OXYCODONE AND ACETAMINOPHEN 10; 325 MG/1; MG/1
1 TABLET ORAL EVERY 6 HOURS PRN
Qty: 120 TABLET | Refills: 0 | Status: SHIPPED | OUTPATIENT
Start: 2021-03-09 | End: 2021-04-15 | Stop reason: SDUPTHER

## 2021-03-18 DIAGNOSIS — E11.9 CONTROLLED TYPE 2 DIABETES MELLITUS WITHOUT COMPLICATION, WITHOUT LONG-TERM CURRENT USE OF INSULIN (HCC): ICD-10-CM

## 2021-03-18 DIAGNOSIS — J44.9 CHRONIC OBSTRUCTIVE PULMONARY DISEASE, UNSPECIFIED COPD TYPE (HCC): ICD-10-CM

## 2021-03-19 RX ORDER — ATORVASTATIN CALCIUM 20 MG/1
TABLET, FILM COATED ORAL
Qty: 90 TABLET | Refills: 0 | Status: SHIPPED | OUTPATIENT
Start: 2021-03-19 | End: 2021-06-17

## 2021-03-19 RX ORDER — HYDROCHLOROTHIAZIDE 25 MG/1
TABLET ORAL
Qty: 30 TABLET | Refills: 0 | Status: SHIPPED | OUTPATIENT
Start: 2021-03-19 | End: 2021-04-19

## 2021-04-12 RX ORDER — BENAZEPRIL HYDROCHLORIDE 20 MG/1
TABLET ORAL
Qty: 90 TABLET | Refills: 0 | Status: SHIPPED | OUTPATIENT
Start: 2021-04-12 | End: 2021-07-19

## 2021-04-14 DIAGNOSIS — M54.50 ACUTE MIDLINE LOW BACK PAIN WITHOUT SCIATICA: ICD-10-CM

## 2021-04-15 RX ORDER — OXYCODONE AND ACETAMINOPHEN 10; 325 MG/1; MG/1
1 TABLET ORAL EVERY 6 HOURS PRN
Qty: 120 TABLET | Refills: 0 | Status: SHIPPED | OUTPATIENT
Start: 2021-04-15 | End: 2021-05-18 | Stop reason: SDUPTHER

## 2021-04-17 DIAGNOSIS — F51.01 PRIMARY INSOMNIA: ICD-10-CM

## 2021-04-17 DIAGNOSIS — J44.9 CHRONIC OBSTRUCTIVE PULMONARY DISEASE, UNSPECIFIED COPD TYPE (HCC): ICD-10-CM

## 2021-04-19 RX ORDER — HYDROCHLOROTHIAZIDE 25 MG/1
TABLET ORAL
Qty: 30 TABLET | Refills: 0 | Status: SHIPPED | OUTPATIENT
Start: 2021-04-19 | End: 2021-04-21

## 2021-04-19 RX ORDER — TRAZODONE HYDROCHLORIDE 50 MG/1
TABLET ORAL
Qty: 90 TABLET | Refills: 0 | Status: SHIPPED | OUTPATIENT
Start: 2021-04-19 | End: 2021-07-19

## 2021-04-21 DIAGNOSIS — J44.9 CHRONIC OBSTRUCTIVE PULMONARY DISEASE, UNSPECIFIED COPD TYPE (HCC): ICD-10-CM

## 2021-04-21 RX ORDER — HYDROCHLOROTHIAZIDE 25 MG/1
TABLET ORAL
Qty: 30 TABLET | Refills: 0 | Status: SHIPPED | OUTPATIENT
Start: 2021-04-21 | End: 2021-06-17

## 2021-04-28 ENCOUNTER — OFFICE VISIT (OUTPATIENT)
Dept: PRIMARY CARE CLINIC | Age: 71
End: 2021-04-28
Payer: COMMERCIAL

## 2021-04-28 VITALS
HEIGHT: 67 IN | DIASTOLIC BLOOD PRESSURE: 70 MMHG | OXYGEN SATURATION: 96 % | WEIGHT: 270.8 LBS | BODY MASS INDEX: 42.5 KG/M2 | SYSTOLIC BLOOD PRESSURE: 130 MMHG | HEART RATE: 104 BPM

## 2021-04-28 DIAGNOSIS — E11.9 CONTROLLED TYPE 2 DIABETES MELLITUS WITHOUT COMPLICATION, WITHOUT LONG-TERM CURRENT USE OF INSULIN (HCC): Primary | ICD-10-CM

## 2021-04-28 DIAGNOSIS — M54.50 CHRONIC MIDLINE LOW BACK PAIN WITHOUT SCIATICA: ICD-10-CM

## 2021-04-28 DIAGNOSIS — G89.29 CHRONIC MIDLINE LOW BACK PAIN WITHOUT SCIATICA: ICD-10-CM

## 2021-04-28 LAB — HBA1C MFR BLD: 6 %

## 2021-04-28 PROCEDURE — 83036 HEMOGLOBIN GLYCOSYLATED A1C: CPT | Performed by: FAMILY MEDICINE

## 2021-04-28 PROCEDURE — 1123F ACP DISCUSS/DSCN MKR DOCD: CPT | Performed by: FAMILY MEDICINE

## 2021-04-28 PROCEDURE — 99213 OFFICE O/P EST LOW 20 MIN: CPT | Performed by: FAMILY MEDICINE

## 2021-04-28 PROCEDURE — 4004F PT TOBACCO SCREEN RCVD TLK: CPT | Performed by: FAMILY MEDICINE

## 2021-04-28 PROCEDURE — 4040F PNEUMOC VAC/ADMIN/RCVD: CPT | Performed by: FAMILY MEDICINE

## 2021-04-28 PROCEDURE — 3044F HG A1C LEVEL LT 7.0%: CPT | Performed by: FAMILY MEDICINE

## 2021-04-28 PROCEDURE — G8427 DOCREV CUR MEDS BY ELIG CLIN: HCPCS | Performed by: FAMILY MEDICINE

## 2021-04-28 PROCEDURE — 2022F DILAT RTA XM EVC RTNOPTHY: CPT | Performed by: FAMILY MEDICINE

## 2021-04-28 PROCEDURE — 3017F COLORECTAL CA SCREEN DOC REV: CPT | Performed by: FAMILY MEDICINE

## 2021-04-28 PROCEDURE — G8417 CALC BMI ABV UP PARAM F/U: HCPCS | Performed by: FAMILY MEDICINE

## 2021-04-28 RX ORDER — ORPHENADRINE CITRATE 100 MG/1
100 TABLET, EXTENDED RELEASE ORAL 2 TIMES DAILY
Qty: 14 TABLET | Refills: 0 | Status: SHIPPED | OUTPATIENT
Start: 2021-04-28 | End: 2021-07-28

## 2021-04-28 SDOH — ECONOMIC STABILITY: FOOD INSECURITY: WITHIN THE PAST 12 MONTHS, THE FOOD YOU BOUGHT JUST DIDN'T LAST AND YOU DIDN'T HAVE MONEY TO GET MORE.: NEVER TRUE

## 2021-04-28 SDOH — ECONOMIC STABILITY: INCOME INSECURITY: HOW HARD IS IT FOR YOU TO PAY FOR THE VERY BASICS LIKE FOOD, HOUSING, MEDICAL CARE, AND HEATING?: NOT HARD AT ALL

## 2021-04-28 ASSESSMENT — ENCOUNTER SYMPTOMS
ABDOMINAL PAIN: 0
COUGH: 0
EYE DISCHARGE: 0
EYE REDNESS: 0
SHORTNESS OF BREATH: 0
RHINORRHEA: 0
BACK PAIN: 1
VOMITING: 0
SORE THROAT: 0
DIARRHEA: 0
WHEEZING: 0
NAUSEA: 0

## 2021-04-28 ASSESSMENT — PATIENT HEALTH QUESTIONNAIRE - PHQ9: SUM OF ALL RESPONSES TO PHQ9 QUESTIONS 1 & 2: 0

## 2021-04-28 NOTE — PROGRESS NOTES
[address] [facility] PRIMARY CARE  [address] [facility] [address]  Dept: [address] [facility] Olivier is a 66 y.o. male Established patient, who presents today for his medical conditions/complaints as noted below. Chief Complaint   Patient presents with    Diabetes       HPI:     HPI  Pt with some back spasms, started yesterday. Getting worse. Been taking his percocet. Not checking his blood sugars. No fever or chills. Pt had a trial with a stimulator for the back, had good improvement in his pain. States will have stimulator placed by Dr. Ian Resendez May 20th. Pain mostly in the mid thoracic region. Reviewed prior notes None  Reviewed previous Labs    LDL Cholesterol (mg/dL)   Date Value   01/11/2021 25     LDL Calculated (mg/dL)   Date Value   01/12/2018 65       (goal LDL is <100)   AST (U/L)   Date Value   01/11/2021 18     ALT (U/L)   Date Value   01/11/2021 21     BUN (mg/dL)   Date Value   01/11/2021 17     Hemoglobin A1C (%)   Date Value   04/28/2021 6.0     TSH (mIU/L)   Date Value   01/03/2020 1.78     BP Readings from Last 3 Encounters:   04/28/21 130/70   01/14/21 (!) 158/83   01/13/21 126/74          (goal 120/80)    Past Medical History:   Diagnosis Date    Arthritis     [facility]chronic obstructive [facility] disease) [facility] [address] [facility]     Diabetes mellitus ([facility] [facility]     GURPREET on CPAP       Past Surgical History:   Procedure Laterality Date    APPENDECTOMY  1952    CHOLECYSTECTOMY, LAPAROSCOPIC  1996    JOINT REPLACEMENT Right 11/07/2017    LUMBAR LAMINECTOMY  10/24/2018    L2-L5 laminectomy, foraminotomy    TONSILLECTOMY  1959    TOTAL KNEE ARTHROPLASTY Left 02/01/2017       No family history on file.     Social History     Tobacco Use    Smoking status: Current Every Day Smoker     Packs/day: 1.00     Years: 42.00     Pack years: 42.00     Types: Cigarettes     Last attempt to quit: 10/12/2017     Years since quitting: 3.5    Smokeless tobacco: Never Used Substance Use Topics    Alcohol use: No      Current Outpatient Medications   Medication Sig Dispense Refill    orphenadrine (NORFLEX) 100 MG extended release tablet Take 1 tablet by mouth 2 times daily 14 tablet 0    hydroCHLOROthiazide (HYDRODIURIL) 25 MG tablet TAKE 1 TABLET BY MOUTH EVERY DAY  30 tablet 0    traZODone (DESYREL) 50 MG tablet TAKE 1 TABLET BY MOUTH EVERY DAY AT NIGHT AS NEEDED for sleep 90 tablet 0    oxyCODONE-acetaminophen (PERCOCET)  MG per tablet Take 1 tablet by mouth every 6 hours as needed for Pain for up to 30 days. 120 tablet 0    benazepril (LOTENSIN) 20 MG tablet TAKE 1 TABLET BY MOUTH ONE TIME A DAY  90 tablet 0    atorvastatin (LIPITOR) 20 MG tablet TAKE 1 TABLET BY MOUTH EVERY DAY AT NIGHT 90 tablet 0    metFORMIN (GLUCOPHAGE) 1000 MG tablet TAKE 1 TABLET BY MOUTH ONE TIME A DAY WITH BREAKFAST 90 tablet 0    vitamin D 25 MCG (1000 UT) CAPS Take 2 capsules by mouth daily 60 capsule 5    budesonide-formoterol (SYMBICORT) 160-4.5 MCG/ACT AERO Inhale 2 puffs into the lungs 2 times daily 10.2 g 5    celecoxib (CELEBREX) 200 MG capsule TAKE 1 CAPSULE BY MOUTH EVERY DAY      ammonium lactate (LAC-HYDRIN) 12 % lotion Apply topically daily. 225 mL 2    albuterol sulfate HFA (PROAIR HFA) 108 (90 Base) MCG/ACT inhaler Inhale 2 puffs into the lungs every 6 hours as needed for Wheezing 8.5 g 5    ciclopirox (LOPROX) 0.77 % cream Apply topically 2 times daily. 30 g 1    Crisaborole 2 % OINT Apply 1 applicator topically 2 times daily 60 g 1    ipratropium-albuterol (DUONEB) 0.5-2.5 (3) MG/3ML SOLN nebulizer solution Inhale 3 mLs into the lungs every 6 hours as needed for Shortness of Breath 360 mL 1    gabapentin (NEURONTIN) 300 MG capsule Take 1 capsule by mouth 4 times daily for 30 days. 120 capsule 5     No current facility-administered medications for this visit.       Allergies   Allergen Reactions    Cheratussin Ac [Guaifenesin-Codeine] Other (See Comments) No discharge. Left eye: No discharge. Conjunctiva/sclera: Conjunctivae normal.      Pupils: Pupils are equal, round, and reactive to light. Neck:      Thyroid: No thyromegaly. Trachea: No tracheal deviation. Cardiovascular:      Rate and Rhythm: Normal rate and regular rhythm. Heart sounds: Normal heart sounds. Pulmonary:      Effort: Pulmonary effort is normal. No respiratory distress. Breath sounds: Normal breath sounds. No wheezing. Musculoskeletal:      Comments: Pain in mid thoracic region    Lymphadenopathy:      Cervical: No cervical adenopathy. Skin:     General: Skin is warm. Findings: No rash. Neurological:      Mental Status: He is alert and oriented to person, place, and time. Psychiatric:         Mood and Affect: Mood normal.         Behavior: Behavior normal.         Thought Content: Thought content normal.         Assessment:       Diagnosis Orders   1. Controlled type 2 diabetes mellitus without complication, without long-term current use of insulin ([facility])  VT COLLECTION CAPILLARY BLOOD SPECIMEN    POCT glycosylated hemoglobin (Hb A1C)   2. Chronic midline low back pain without sciatica          Plan:    Norflex for 7 days. Continue diabetic medications as is. Await stimulator for chronic back pain. Continue medications as is. Return in about 3 months (around 7/28/2021). Orders Placed This Encounter   Procedures    POCT glycosylated hemoglobin (Hb A1C)    VT COLLECTION CAPILLARY BLOOD SPECIMEN     Orders Placed This Encounter   Medications    orphenadrine (NORFLEX) 100 MG extended release tablet     Sig: Take 1 tablet by mouth 2 times daily     Dispense:  14 tablet     Refill:  0       Patient given educational materials - see patient instructions. Discussed use, benefit, and side effects of prescribed medications. All patient questions answered. Pt voiced understanding. Reviewed health maintenance.   Instructed to continue current medications, diet andexercise. Patient agreed with treatment plan. Follow up as directed.      Electronicallysigned by Abdi Lechuga MD on 4/28/2021 at 4:18 PM

## 2021-04-30 ENCOUNTER — HOSPITAL ENCOUNTER (EMERGENCY)
Age: 71
Discharge: HOME | End: 2021-04-30
Attending: EMERGENCY MEDICINE
Payer: COMMERCIAL

## 2021-04-30 VITALS
SYSTOLIC BLOOD PRESSURE: 151 MMHG | BODY MASS INDEX: 41.59 KG/M2 | TEMPERATURE: 98.6 F | RESPIRATION RATE: 16 BRPM | OXYGEN SATURATION: 96 % | WEIGHT: 265 LBS | HEIGHT: 67 IN | HEART RATE: 86 BPM | DIASTOLIC BLOOD PRESSURE: 83 MMHG

## 2021-04-30 DIAGNOSIS — M62.830 BACK MUSCLE SPASM: Primary | ICD-10-CM

## 2021-04-30 PROCEDURE — 6370000000 HC RX 637 (ALT 250 FOR IP): Performed by: PHYSICIAN ASSISTANT

## 2021-04-30 PROCEDURE — 6360000002 HC RX W HCPCS: Performed by: PHYSICIAN ASSISTANT

## 2021-04-30 PROCEDURE — 99285 EMERGENCY DEPT VISIT HI MDM: CPT

## 2021-04-30 PROCEDURE — 96372 THER/PROPH/DIAG INJ SC/IM: CPT

## 2021-04-30 RX ORDER — PREDNISONE 20 MG/1
40 TABLET ORAL ONCE
Status: COMPLETED | OUTPATIENT
Start: 2021-04-30 | End: 2021-04-30

## 2021-04-30 RX ORDER — ORPHENADRINE CITRATE 30 MG/ML
60 INJECTION INTRAMUSCULAR; INTRAVENOUS ONCE
Status: COMPLETED | OUTPATIENT
Start: 2021-04-30 | End: 2021-04-30

## 2021-04-30 RX ORDER — PREDNISONE 10 MG/1
20 TABLET ORAL DAILY
Qty: 4 TABLET | Refills: 0 | Status: SHIPPED | OUTPATIENT
Start: 2021-04-30 | End: 2021-07-28

## 2021-04-30 RX ORDER — KETOROLAC TROMETHAMINE 30 MG/ML
30 INJECTION, SOLUTION INTRAMUSCULAR; INTRAVENOUS ONCE
Status: COMPLETED | OUTPATIENT
Start: 2021-04-30 | End: 2021-04-30

## 2021-04-30 RX ADMIN — PREDNISONE 40 MG: 20 TABLET ORAL at 20:22

## 2021-04-30 RX ADMIN — KETOROLAC TROMETHAMINE 30 MG: 30 INJECTION, SOLUTION INTRAMUSCULAR; INTRAVENOUS at 18:33

## 2021-04-30 RX ADMIN — ORPHENADRINE CITRATE 60 MG: 30 INJECTION INTRAMUSCULAR; INTRAVENOUS at 18:33

## 2021-04-30 ASSESSMENT — PAIN DESCRIPTION - PROGRESSION: CLINICAL_PROGRESSION: GRADUALLY IMPROVING

## 2021-04-30 ASSESSMENT — PAIN DESCRIPTION - ORIENTATION: ORIENTATION: MID

## 2021-04-30 ASSESSMENT — PAIN DESCRIPTION - PAIN TYPE: TYPE: ACUTE PAIN

## 2021-04-30 ASSESSMENT — PAIN SCALES - GENERAL: PAINLEVEL_OUTOF10: 8

## 2021-04-30 NOTE — ED PROVIDER NOTES
[address] [facility]  [address] [facility] [address]  Phone: [phone]  Fax: [address]      Pt Name: Ara Merritt MRN: [id number]  Birthdate 1950  Date of evaluation: 4/30/2021  Provider: Waqar Sotelo PA-C    CHIEF COMPLAINT       Chief Complaint   Patient presents with    Spasms     mid back           HISTORY OF PRESENT ILLNESS  (Location/Symptom, Timing/Onset, Context/Setting, Quality, Duration, Modifying Factors, Severity.)   Ara Merritt is a 66 y.o. male who presents to the emergency department for BACK PAIN complaint:     Context/timeframe:    Nontraumatic mid back muscular spasms since Tuesday. Patient denies any bladder or bowel incontinence or any focal weakness/numbness of extremities. Patient denies any associated chest/respiratory/abdominal/urinary/stooling changes. Patient states that he was into see his PCP couple days ago for same symptoms and was provided with a muscle relaxer prescription. Patient takes scheduled pain medicines and does have a neurosurgeon that he sees. Patient states that he has been having mid muscular pain made worse by movement. He has no other complaints. Chronic  Neck/back pain:    YES    Location/Symptom:  Mid   Back,   bilateral  Sciatica? NO  Spasms? YES   New rash? NO  Bladder/bowel incontinence? NO  Paresthesias? NO  Abdominal Pain? NO  Hx AAA or 1* relative? NO  Dysuria? NO  Quality: Aching  Duration: Persistent  Modifying Factors: Worse with bending and twisting  Severity:  Moderate    Nursing Notes were reviewed. REVIEW OF SYSTEMS    (2-9 systems for level 4, 10 or more for level 5)     Review of Systems   Constitutional: Negative. HENT: Negative. Eyes: Negative. Respiratory: Negative. Cardiovascular: Negative. Gastrointestinal: Negative. Musculoskeletal: Back pain   Endocrine: Negative. Genitourinary: Negative. Skin: Negative. Allergic/Immunologic: Negative. Neurological: Negative. Hematological: Negative. Psychiatric/Behavioral: Negative. Except as noted above the remainder of the review of systems was reviewed and negative. PAST MEDICAL HISTORY         Diagnosis Date    Arthritis     [facility]ch[facility] obstructive [facility] disease) [facility] [address]     Diabetes mellitus ([facility] [address]     GURPREET on CPAP        SURGICAL HISTORY           Procedure Laterality Date    APPENDECTOMY  1952    CHOLECYSTECTOMY, LAPAROSCOPIC  1996    JOINT REPLACEMENT Right 11/07/2017    LUMBAR LAMINECTOMY  10/24/2018    L2-L5 laminectomy, foraminotomy    TONSILLECTOMY  1959    TOTAL KNEE ARTHROPLASTY Left 02/01/2017       CURRENT MEDICATIONS       Previous Medications    ALBUTEROL SULFATE HFA (PROAIR HFA) 108 (90 BASE) MCG/ACT INHALER    Inhale 2 puffs into the lungs every 6 hours as needed for Wheezing    AMMONIUM LACTATE (LAC-HYDRIN) 12 % LOTION    Apply topically daily. ATORVASTATIN (LIPITOR) 20 MG TABLET    TAKE 1 TABLET BY MOUTH EVERY DAY AT NIGHT    BENAZEPRIL (LOTENSIN) 20 MG TABLET    TAKE 1 TABLET BY MOUTH ONE TIME A DAY     BUDESONIDE-FORMOTEROL (SYMBICORT) 160-4.5 MCG/ACT AERO    Inhale 2 puffs into the lungs 2 times daily    CELECOXIB (CELEBREX) 200 MG CAPSULE    TAKE 1 CAPSULE BY MOUTH EVERY DAY    CICLOPIROX (LOPROX) 0.77 % CREAM    Apply topically 2 times daily. CRISABOROLE 2 % OINT    Apply 1 applicator topically 2 times daily    GABAPENTIN (NEURONTIN) 300 MG CAPSULE    Take 1 capsule by mouth 4 times daily for 30 days.     HYDROCHLOROTHIAZIDE (HYDRODIURIL) 25 MG TABLET    TAKE 1 TABLET BY MOUTH EVERY DAY     IPRATROPIUM-ALBUTEROL (DUONEB) 0.5-2.5 (3) MG/3ML SOLN NEBULIZER SOLUTION    Inhale 3 mLs into the lungs every 6 hours as needed for Shortness of Breath    METFORMIN (GLUCOPHAGE) 1000 MG TABLET    TAKE 1 TABLET BY MOUTH ONE TIME A DAY WITH BREAKFAST    ORPHENADRINE (NORFLEX) 100 MG EXTENDED RELEASE TABLET    Take 1 tablet by mouth 2 noted.   Psychiatric: Mood, memory, affect and judgment normal.           DIAGNOSTIC RESULTS     RADIOLOGY:   Non-plain film images such as CT, Ultrasound and MRI are read by the radiologist. Plain radiographic images are visualized and preliminarily interpreted by the emergency physician with the below findings:    Interpretation per the Radiologist below, if available at the time of this note:    No orders to display       Not indicated    LABS:  Labs Reviewed - No data to display  Not indicated    All other labs were within normal range or not returned as of this dictation. EMERGENCY DEPARTMENT COURSE and DIFFERENTIAL DIAGNOSIS/MDM:   Vitals:    Vitals:    04/30/21 1809   BP: (!) 151/83   Pulse: 86   Resp: 16   Temp: 98.6 °F (37 °C)   TempSrc: Oral   SpO2: 96%   Weight: 120.2 kg (265 lb)   Height: 5' 7\" (1.702 m)       1827  Toradol/Norflex IM here. Spasming, nontraumatic. Hx chronic lumbago, has other pain meds at home. Recent PCP visit for this 2 days ago. No deficits by history or exam.       CONSULTS:  None    PROCEDURES:  None    2010  Pt already on Gabapentin and Oxycodone 10mg. He has a number of muscle relaxers to use at home as well. He declined Lidoderm patches but states previous Prednisone helped. He takes Metformin so let him know this may bump his BG. Giving lower dose x 4 days at home, 1st dose here. Recommended rest as best treatment. The patient presents with low back pain without signs of spinal cord compression, cauda equina syndrome, infection, aneurysm or other serious etiology. The patient is neurologically intact. Given the extremely low risk of these diagnoses further testing and evaluation for these possibilities does not appear to be indicated at this time. The patient has been instructed to return if the symptoms worsen or change in any way. I have reviewed the disposition diagnosis with the patient and or their family/guardian.   I have answered their questions and given discharge instructions. They voiced understanding of these instructions and did not have any further questions or complaints. FINAL IMPRESSION      1. Back muscle spasm          DISPOSITION/PLAN   DISPOSITION Decision To Discharge    PATIENT REFERRED TO:  Melecio Gtz [facility] Adrienne Hadley. [address] [facility] [place]  [phone]    Schedule an appointment as soon as possible for a visit in 3 days  for re-evaluation of your symptoms    [facility] ED  [address]   [address] [id number]  [phone]  Go to   muscle weakness, numbness of groin or incontinence      DISCHARGE MEDICATIONS:  New Prescriptions    PREDNISONE (DELTASONE) 10 MG TABLET    Take 2 tablets by mouth daily Take 4 tablets by mouth once daily for 5 days       (Please note that portions of this note were completed with a voice recognition program.  Efforts were made to edit the dictations but occasionally words are mis-transcribed.)    Jacques Favre, PA-C Jacques Favre, PA-C  04/30/21 2012

## 2021-04-30 NOTE — ED PROVIDER NOTES
Emergency Department         COMPLAINT       Chief Complaint   Patient presents with    Spasms     mid back      PHYSICAL EXAM      ED Triage Vitals [04/30/21 1809]   BP Temp Temp Source Pulse Resp SpO2 Height Weight   (!) 151/83 98.6 °F (37 °C) Oral 86 16 96 % 5' 7\" (1.702 m) 265 lb (120.2 kg)         Constitutional: Alert, oriented x3, nontoxic, answering questions appropriately, acting properly for age, in no acute distress   HEENT: Extraocular muscles intact  Neck: Trachea midline   Cardiovascular: Regular rhythm and rate no S3, S4, or murmurs   Respiratory: Diminished to auscultation bilaterally no wheezes, rhonchi, rales, no respiratory distress no tachypnea no retractions no hypoxia  Gastrointestinal: Soft, nontender, obese, diminished bowel sounds. No rebound, rigidity, or guarding. Musculoskeletal: No extremity pain positive chronic bilateral lower extremity swelling exquisite tenderness across the mid thoracic area no step-off or deformity  Neurologic: No gross focal neurologic deficits  Skin: Warm and dry       Physical Exam  DIAGNOSTIC RESULTS     EKG: All EKG's are interpreted by the Emergency Department Physician who either signs or Co-signs this chart in the absence of a cardiologist.    Not indicated unless otherwise documented above or in the midlevel documentation    LABS:  No results found for this visit on 04/30/21. Not indicated unless otherwise documented above or in the midlevel documentation    RADIOLOGY:   I reviewedthe radiologist interpretations:  No orders to display       Not indicated unless otherwise documented above or in the midlevel documentation    EMERGENCY DEPARTMENT COURSE:       PERTINENT ATTENDING PHYSICIAN COMMENTS:    Key exacerbation of chronic mid back pain. Seen by his doctor 2 days ago given a prescription for Norflex. Will try injectable medication and reevaluate. No gross focal neurologic deficits. History of the same.   Seen here in January for back spasms. 7 PM care will be transferred. Faculty Attestation    I performed a history and physical examination of the patient and discussed management with the mid level provideer. I reviewed the mid level provider's note and agree with the documented findings and plan of care. Any areas of disagreement are noted on the chart. I was personally present for the key portions of any procedures. I have documented in the chart those procedures where I was not present during the key portions. I have reviewed the emergency nurses triage note. I agree with the chief complaint, past medical history, past surgical history, allergies, medications, social and family history as documented unless otherwise noted below. Documentation of the HPI, Physical Exam and Medical Decision Making performed by medical students or scribes is based on my personal performance of the HPI, PE and MDM. For Physician Assistant/ Nurse Practitioner cases/documentation I have personally evaluated this patient and have completed at least one if not all key elements of the E/M (history, physical exam, and MDM). Additional findings are as noted.        Deann Perez DO  04/30/21 9008

## 2021-05-04 ENCOUNTER — TELEPHONE (OUTPATIENT)
Dept: PRIMARY CARE CLINIC | Age: 71
End: 2021-05-04

## 2021-05-04 NOTE — TELEPHONE ENCOUNTER
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   Pt is requesting letter if possible    Please advise

## 2021-05-17 DIAGNOSIS — E11.9 CONTROLLED TYPE 2 DIABETES MELLITUS WITHOUT COMPLICATION, WITHOUT LONG-TERM CURRENT USE OF INSULIN (HCC): ICD-10-CM

## 2021-05-18 DIAGNOSIS — M54.50 ACUTE MIDLINE LOW BACK PAIN WITHOUT SCIATICA: ICD-10-CM

## 2021-05-18 RX ORDER — OXYCODONE AND ACETAMINOPHEN 10; 325 MG/1; MG/1
1 TABLET ORAL EVERY 6 HOURS PRN
Qty: 120 TABLET | Refills: 0 | Status: SHIPPED | OUTPATIENT
Start: 2021-05-18 | End: 2021-06-15 | Stop reason: SDUPTHER

## 2021-06-15 DIAGNOSIS — M54.50 ACUTE MIDLINE LOW BACK PAIN WITHOUT SCIATICA: ICD-10-CM

## 2021-06-15 RX ORDER — OXYCODONE AND ACETAMINOPHEN 10; 325 MG/1; MG/1
1 TABLET ORAL EVERY 6 HOURS PRN
Qty: 120 TABLET | Refills: 0 | Status: SHIPPED | OUTPATIENT
Start: 2021-06-15 | End: 2021-07-16 | Stop reason: SDUPTHER

## 2021-06-16 DIAGNOSIS — J44.9 CHRONIC OBSTRUCTIVE PULMONARY DISEASE, UNSPECIFIED COPD TYPE (HCC): ICD-10-CM

## 2021-06-16 DIAGNOSIS — E11.9 CONTROLLED TYPE 2 DIABETES MELLITUS WITHOUT COMPLICATION, WITHOUT LONG-TERM CURRENT USE OF INSULIN (HCC): ICD-10-CM

## 2021-06-17 RX ORDER — HYDROCHLOROTHIAZIDE 25 MG/1
TABLET ORAL
Qty: 30 TABLET | Refills: 0 | Status: SHIPPED | OUTPATIENT
Start: 2021-06-17 | End: 2021-06-21

## 2021-06-17 RX ORDER — ATORVASTATIN CALCIUM 20 MG/1
TABLET, FILM COATED ORAL
Qty: 90 TABLET | Refills: 0 | Status: SHIPPED | OUTPATIENT
Start: 2021-06-17 | End: 2021-07-28

## 2021-06-20 DIAGNOSIS — J44.9 CHRONIC OBSTRUCTIVE PULMONARY DISEASE, UNSPECIFIED COPD TYPE (HCC): ICD-10-CM

## 2021-06-21 ENCOUNTER — TELEPHONE (OUTPATIENT)
Dept: PRIMARY CARE CLINIC | Age: 71
End: 2021-06-21

## 2021-06-21 RX ORDER — HYDROCHLOROTHIAZIDE 25 MG/1
TABLET ORAL
Qty: 30 TABLET | Refills: 0 | Status: SHIPPED | OUTPATIENT
Start: 2021-06-21 | End: 2021-07-19

## 2021-06-21 NOTE — TELEPHONE ENCOUNTER
Pt called requesting something from pcp to renew his handicap plates. (NOT placard). Please advise/ ok to renew handicap plates? Please inform pt when done & find out what he would like to do with script.

## 2021-07-16 DIAGNOSIS — F51.01 PRIMARY INSOMNIA: ICD-10-CM

## 2021-07-16 DIAGNOSIS — M54.50 ACUTE MIDLINE LOW BACK PAIN WITHOUT SCIATICA: ICD-10-CM

## 2021-07-16 DIAGNOSIS — J44.9 CHRONIC OBSTRUCTIVE PULMONARY DISEASE, UNSPECIFIED COPD TYPE (HCC): ICD-10-CM

## 2021-07-16 RX ORDER — OXYCODONE AND ACETAMINOPHEN 10; 325 MG/1; MG/1
1 TABLET ORAL EVERY 6 HOURS PRN
Qty: 120 TABLET | Refills: 0 | Status: SHIPPED | OUTPATIENT
Start: 2021-07-16 | End: 2021-07-28

## 2021-07-19 RX ORDER — TRAZODONE HYDROCHLORIDE 50 MG/1
TABLET ORAL
Qty: 90 TABLET | Refills: 0 | Status: SHIPPED | OUTPATIENT
Start: 2021-07-19 | End: 2021-07-28

## 2021-07-19 RX ORDER — BENAZEPRIL HYDROCHLORIDE 20 MG/1
TABLET ORAL
Qty: 90 TABLET | Refills: 0 | Status: SHIPPED | OUTPATIENT
Start: 2021-07-19 | End: 2021-07-28

## 2021-07-19 RX ORDER — HYDROCHLOROTHIAZIDE 25 MG/1
TABLET ORAL
Qty: 30 TABLET | Refills: 0 | Status: SHIPPED | OUTPATIENT
Start: 2021-07-19 | End: 2021-07-28

## 2021-07-28 ENCOUNTER — OFFICE VISIT (OUTPATIENT)
Dept: PRIMARY CARE CLINIC | Age: 71
End: 2021-07-28
Payer: COMMERCIAL

## 2021-07-28 VITALS
HEART RATE: 95 BPM | SYSTOLIC BLOOD PRESSURE: 118 MMHG | OXYGEN SATURATION: 94 % | BODY MASS INDEX: 42.38 KG/M2 | WEIGHT: 270 LBS | HEIGHT: 67 IN | DIASTOLIC BLOOD PRESSURE: 76 MMHG

## 2021-07-28 DIAGNOSIS — M54.50 ACUTE MIDLINE LOW BACK PAIN WITHOUT SCIATICA: ICD-10-CM

## 2021-07-28 DIAGNOSIS — Z12.11 ENCOUNTER FOR SCREENING FOR MALIGNANT NEOPLASM OF COLON: ICD-10-CM

## 2021-07-28 DIAGNOSIS — J44.9 CHRONIC OBSTRUCTIVE PULMONARY DISEASE, UNSPECIFIED COPD TYPE (HCC): ICD-10-CM

## 2021-07-28 DIAGNOSIS — F51.01 PRIMARY INSOMNIA: ICD-10-CM

## 2021-07-28 DIAGNOSIS — E11.9 CONTROLLED TYPE 2 DIABETES MELLITUS WITHOUT COMPLICATION, WITHOUT LONG-TERM CURRENT USE OF INSULIN (HCC): Primary | ICD-10-CM

## 2021-07-28 LAB — HBA1C MFR BLD: 6 %

## 2021-07-28 PROCEDURE — 3044F HG A1C LEVEL LT 7.0%: CPT | Performed by: FAMILY MEDICINE

## 2021-07-28 PROCEDURE — G8926 SPIRO NO PERF OR DOC: HCPCS | Performed by: FAMILY MEDICINE

## 2021-07-28 PROCEDURE — G8427 DOCREV CUR MEDS BY ELIG CLIN: HCPCS | Performed by: FAMILY MEDICINE

## 2021-07-28 PROCEDURE — 3017F COLORECTAL CA SCREEN DOC REV: CPT | Performed by: FAMILY MEDICINE

## 2021-07-28 PROCEDURE — 4040F PNEUMOC VAC/ADMIN/RCVD: CPT | Performed by: FAMILY MEDICINE

## 2021-07-28 PROCEDURE — 1123F ACP DISCUSS/DSCN MKR DOCD: CPT | Performed by: FAMILY MEDICINE

## 2021-07-28 PROCEDURE — 99214 OFFICE O/P EST MOD 30 MIN: CPT | Performed by: FAMILY MEDICINE

## 2021-07-28 PROCEDURE — 2022F DILAT RTA XM EVC RTNOPTHY: CPT | Performed by: FAMILY MEDICINE

## 2021-07-28 PROCEDURE — 4004F PT TOBACCO SCREEN RCVD TLK: CPT | Performed by: FAMILY MEDICINE

## 2021-07-28 PROCEDURE — G8417 CALC BMI ABV UP PARAM F/U: HCPCS | Performed by: FAMILY MEDICINE

## 2021-07-28 PROCEDURE — 3023F SPIROM DOC REV: CPT | Performed by: FAMILY MEDICINE

## 2021-07-28 PROCEDURE — 83036 HEMOGLOBIN GLYCOSYLATED A1C: CPT | Performed by: FAMILY MEDICINE

## 2021-07-28 RX ORDER — HYDROCHLOROTHIAZIDE 50 MG/1
50 TABLET ORAL DAILY
Qty: 90 TABLET | Refills: 3 | Status: SHIPPED | OUTPATIENT
Start: 2021-07-28 | End: 2022-08-16

## 2021-07-28 RX ORDER — BENAZEPRIL HYDROCHLORIDE 20 MG/1
20 TABLET ORAL DAILY
Qty: 90 TABLET | Refills: 2 | Status: SHIPPED | OUTPATIENT
Start: 2021-07-28 | End: 2022-07-15

## 2021-07-28 RX ORDER — TRAZODONE HYDROCHLORIDE 50 MG/1
50 TABLET ORAL NIGHTLY
Qty: 90 TABLET | Refills: 2 | Status: SHIPPED | OUTPATIENT
Start: 2021-07-28 | End: 2021-11-01 | Stop reason: SDUPTHER

## 2021-07-28 RX ORDER — METAXALONE 800 MG/1
TABLET ORAL
COMMUNITY
Start: 2021-07-27 | End: 2022-02-01

## 2021-07-28 RX ORDER — MUPIROCIN CALCIUM 20 MG/G
CREAM TOPICAL
Qty: 30 G | Refills: 2 | Status: SHIPPED | OUTPATIENT
Start: 2021-07-28 | End: 2022-05-10 | Stop reason: SDUPTHER

## 2021-07-28 RX ORDER — ATORVASTATIN CALCIUM 20 MG/1
20 TABLET, FILM COATED ORAL NIGHTLY
Qty: 90 TABLET | Refills: 3 | Status: SHIPPED | OUTPATIENT
Start: 2021-07-28 | End: 2022-08-04 | Stop reason: SDUPTHER

## 2021-07-28 RX ORDER — OXYCODONE AND ACETAMINOPHEN 10; 325 MG/1; MG/1
1 TABLET ORAL EVERY 8 HOURS PRN
Qty: 90 TABLET | Refills: 0
Start: 2021-07-28 | End: 2021-08-26 | Stop reason: SDUPTHER

## 2021-07-28 RX ORDER — OXYCODONE HYDROCHLORIDE 15 MG/1
TABLET ORAL
COMMUNITY
Start: 2021-05-20 | End: 2021-07-28

## 2021-07-28 ASSESSMENT — ENCOUNTER SYMPTOMS
VOMITING: 0
COUGH: 0
EYE DISCHARGE: 0
RHINORRHEA: 0
EYE REDNESS: 0
WHEEZING: 0
SHORTNESS OF BREATH: 0
ABDOMINAL PAIN: 0
DIARRHEA: 0
NAUSEA: 0
SORE THROAT: 0

## 2021-07-28 NOTE — PROGRESS NOTES
[address] [facility]  [address] [facility] [address]  Dept: 700 Yonathan Aguayo is a 66 y.o. male Established patient, who presents today for his medical conditions/complaints as noted below. Chief Complaint   Patient presents with    3 Month Follow-Up       HPI:     HPI  Patient here for 3-month recheck. Not checking blood sugars on a regular basis. Denies any low blood sugar reactions. No fevers or chills. Otherwise unremarkable. Patient did have his nerve stimulator placed. States is down to 3 a day on his Percocet at the 10/3/2025. Patient complaining of increased swelling in the lower extremities. Has been using his hydrochlorothiazide at 25 mg daily. Patient also complaining of an open wound on the left anterior leg. Denies any redness or streaking. States is painful to palpation. Patient states never did get his Cologuard box. Denies any melena or hematochezia.     Reviewed prior notes None  Reviewed previous Labs    LDL Cholesterol (mg/dL)   Date Value   01/11/2021 25     LDL Calculated (mg/dL)   Date Value   01/12/2018 65       (goal LDL is <100)   AST (U/L)   Date Value   01/11/2021 18     ALT (U/L)   Date Value   01/11/2021 21     BUN (mg/dL)   Date Value   01/11/2021 17     Hemoglobin A1C (%)   Date Value   07/28/2021 6.0     TSH (mIU/L)   Date Value   01/03/2020 1.78     BP Readings from Last 3 Encounters:   07/28/21 118/76   04/30/21 (!) 151/83   04/28/21 130/70          (goal 120/80)    Past Medical History:   Diagnosis Date    Arthritis     COPD [facility]chronic obstructive pulmonary disease) ([facility] [address]     Diabetes mellitus ([facility] [address]     GURPREET on CPAP       Past Surgical History:   Procedure Laterality Date    APPENDECTOMY  1952    CHOLECYSTECTOMY, LAPAROSCOPIC  1996    JOINT REPLACEMENT Right 11/07/2017    LUMBAR LAMINECTOMY  10/24/2018    L2-L5 laminectomy, foraminotomy    SPINAL CORD STIMULATOR SURGERY Right 05/2021    TONSILLECTOMY  1959    TOTAL KNEE ARTHROPLASTY Left 02/01/2017       No family history on file. Social History     Tobacco Use    Smoking status: Current Every Day Smoker     Packs/day: 1.00     Years: 42.00     Pack years: 42.00     Types: Cigarettes     Last attempt to quit: 10/12/2017     Years since quitting: 3.7    Smokeless tobacco: Never Used   Substance Use Topics    Alcohol use: No      Current Outpatient Medications   Medication Sig Dispense Refill    metaxalone (SKELAXIN) 800 MG tablet       metFORMIN (GLUCOPHAGE) 1000 MG tablet Take 1 tablet by mouth daily (with breakfast) 90 tablet 3    atorvastatin (LIPITOR) 20 MG tablet Take 1 tablet by mouth nightly 90 tablet 3    oxyCODONE-acetaminophen (PERCOCET)  MG per tablet Take 1 tablet by mouth every 8 hours as needed for Pain for up to 30 days. 90 tablet 0    hydroCHLOROthiazide (HYDRODIURIL) 50 MG tablet Take 1 tablet by mouth daily 90 tablet 3    traZODone (DESYREL) 50 MG tablet Take 1 tablet by mouth nightly 90 tablet 2    benazepril (LOTENSIN) 20 MG tablet Take 1 tablet by mouth daily 90 tablet 2    mupirocin (BACTROBAN) 2 % cream Apply 2 times daily. 30 g 2    vitamin D 25 MCG (1000 UT) CAPS Take 2 capsules by mouth daily 60 capsule 5    budesonide-formoterol (SYMBICORT) 160-4.5 MCG/ACT AERO Inhale 2 puffs into the lungs 2 times daily 10.2 g 5    celecoxib (CELEBREX) 200 MG capsule TAKE 1 CAPSULE BY MOUTH EVERY DAY      ammonium lactate (LAC-HYDRIN) 12 % lotion Apply topically daily. 225 mL 2    albuterol sulfate HFA (PROAIR HFA) 108 (90 Base) MCG/ACT inhaler Inhale 2 puffs into the lungs every 6 hours as needed for Wheezing 8.5 g 5    ciclopirox (LOPROX) 0.77 % cream Apply topically 2 times daily.  30 g 1    Crisaborole 2 % OINT Apply 1 applicator topically 2 times daily 60 g 1    ipratropium-albuterol (DUONEB) 0.5-2.5 (3) MG/3ML SOLN nebulizer solution Inhale 3 mLs into the lungs every 6 hours as needed for Shortness of Breath 360 mL 1    gabapentin (NEURONTIN) 300 MG capsule Take 1 capsule by mouth 4 times daily for 30 days. 120 capsule 5     No current facility-administered medications for this visit. Allergies   Allergen Reactions    Cheratussin Ac [Guaifenesin-Codeine] Other (See Comments)     Coughing Attack. Health Maintenance   Topic Date Due    Diabetic retinal exam  Never done    Shingles Vaccine (1 of 2) Never done    Low dose CT lung screening  06/23/2021    Flu vaccine (1) 09/01/2021    Diabetic microalbuminuria test  12/02/2021    Lipid screen  01/11/2022    Potassium monitoring  01/11/2022    Creatinine monitoring  01/11/2022    Diabetic foot exam  07/28/2022    A1C test (Diabetic or Prediabetic)  07/28/2022    Colon cancer screen fecal DNA test (Cologuard)  07/12/2023    DTaP/Tdap/Td vaccine (3 - Td or Tdap) 07/27/2027    Pneumococcal 65+ years Vaccine  Completed    COVID-19 Vaccine  Completed    AAA screen  Completed    Hepatitis C screen  Completed    Hepatitis A vaccine  Aged Out    Hib vaccine  Aged Out    Meningococcal (ACWY) vaccine  Aged Out       Subjective:      Review of Systems   Constitutional: Negative for chills and fever. HENT: Negative for rhinorrhea and sore throat. Eyes: Negative for discharge and redness. Respiratory: Negative for cough, shortness of breath and wheezing. Cardiovascular: Positive for leg swelling. Negative for chest pain and palpitations. Gastrointestinal: Negative for abdominal pain, diarrhea, nausea and vomiting. Genitourinary: Negative for dysuria and frequency. Musculoskeletal: Negative for arthralgias and myalgias. Neurological: Negative for dizziness, light-headedness and headaches. Psychiatric/Behavioral: Negative for sleep disturbance. Objective:     /76   Pulse 95   Ht 5' 6.96\" (1.701 m)   Wt 270 lb (122.5 kg)   SpO2 94%   BMI 42.34 kg/m²   Physical Exam  Vitals and nursing note reviewed.    Constitutional: General: He is not in acute distress. Appearance: He is well-developed. He is not ill-appearing. HENT:      Head: Normocephalic and atraumatic. Right Ear: External ear normal.      Left Ear: External ear normal.   Eyes:      General: No scleral icterus. Right eye: No discharge. Left eye: No discharge. Conjunctiva/sclera: Conjunctivae normal.      Pupils: Pupils are equal, round, and reactive to light. Neck:      Thyroid: No thyromegaly. Trachea: No tracheal deviation. Cardiovascular:      Rate and Rhythm: Normal rate and regular rhythm. Heart sounds: Normal heart sounds. Comments: No carotid bruits  Pulmonary:      Effort: Pulmonary effort is normal. No respiratory distress. Breath sounds: Normal breath sounds. No wheezing. Lymphadenopathy:      Cervical: No cervical adenopathy. Skin:     General: Skin is warm and dry. Findings: No rash. Comments: Approximately 2 cm open wound noted on the left anterior shin. No active sign of infection   Neurological:      Mental Status: He is alert and oriented to person, place, and time. Comments: Diabetic foot check: Bunions: none . Hammertoes none. Plantar calluses mild. No cyanosis or clubbing. sensation intact on 6 of 6 test points with the 10 gram filament. Dorsalis pedis pulses intact bilaterally. Capillary refill at the toes was less than 2 seconds. No skin breakdown, erythema, blisters, scaling, or ulcers. No evidence of fungal infection. Psychiatric:         Mood and Affect: Mood normal.         Behavior: Behavior normal.         Thought Content: Thought content normal.         Assessment:       Diagnosis Orders   1. Controlled type 2 diabetes mellitus without complication, without long-term current use of insulin ([facility])  POCT glycosylated hemoglobin (Hb A1C)    metFORMIN (GLUCOPHAGE) 1000 MG tablet    atorvastatin (LIPITOR) 20 MG tablet     DIABETES FOOT EXAM   2.  Acute midline low back pain without sciatica  oxyCODONE-acetaminophen (PERCOCET)  MG per tablet   3. Chronic obstructive pulmonary disease, unspecified COPD type ([facility])  hydroCHLOROthiazide (HYDRODIURIL) 50 MG tablet   4. Primary insomnia  traZODone (DESYREL) 50 MG tablet        Plan:    Patient had Shingrix. Increase hydrochlorothiazide 50 mg daily. Continue Percocet but at 3/day. Cologuard ordered  Renew medications  Continue diabetic medications as is    Return in about 3 months (around 10/28/2021). Orders Placed This Encounter   Procedures    POCT glycosylated hemoglobin (Hb A1C)    HM DIABETES FOOT EXAM     Orders Placed This Encounter   Medications    metFORMIN (GLUCOPHAGE) 1000 MG tablet     Sig: Take 1 tablet by mouth daily (with breakfast)     Dispense:  90 tablet     Refill:  3    atorvastatin (LIPITOR) 20 MG tablet     Sig: Take 1 tablet by mouth nightly     Dispense:  90 tablet     Refill:  3    oxyCODONE-acetaminophen (PERCOCET)  MG per tablet     Sig: Take 1 tablet by mouth every 8 hours as needed for Pain for up to 30 days. Dispense:  90 tablet     Refill:  0     Reduce doses taken as pain becomes manageable    hydroCHLOROthiazide (HYDRODIURIL) 50 MG tablet     Sig: Take 1 tablet by mouth daily     Dispense:  90 tablet     Refill:  3    traZODone (DESYREL) 50 MG tablet     Sig: Take 1 tablet by mouth nightly     Dispense:  90 tablet     Refill:  2    benazepril (LOTENSIN) 20 MG tablet     Sig: Take 1 tablet by mouth daily     Dispense:  90 tablet     Refill:  2    mupirocin (BACTROBAN) 2 % cream     Sig: Apply 2 times daily. Dispense:  30 g     Refill:  2       Patient given educational materials - see patient instructions. Discussed use, benefit, and side effects of prescribed medications. All patient questions answered. Pt voiced understanding. Reviewed health maintenance. Instructed to continue current medications, diet andexercise. Patient agreed with treatment plan.  Follow up as directed.      Electronicallysigned by Job Hankins MD on 7/28/2021 at 5:32 PM

## 2021-08-26 DIAGNOSIS — M54.50 ACUTE MIDLINE LOW BACK PAIN WITHOUT SCIATICA: ICD-10-CM

## 2021-08-26 RX ORDER — OXYCODONE AND ACETAMINOPHEN 10; 325 MG/1; MG/1
1 TABLET ORAL EVERY 8 HOURS PRN
Qty: 90 TABLET | Refills: 0 | Status: SHIPPED | OUTPATIENT
Start: 2021-08-26 | End: 2021-09-24 | Stop reason: SDUPTHER

## 2021-09-23 DIAGNOSIS — M54.50 ACUTE MIDLINE LOW BACK PAIN WITHOUT SCIATICA: ICD-10-CM

## 2021-09-24 RX ORDER — OXYCODONE AND ACETAMINOPHEN 10; 325 MG/1; MG/1
1 TABLET ORAL EVERY 8 HOURS PRN
Qty: 90 TABLET | Refills: 0 | Status: SHIPPED | OUTPATIENT
Start: 2021-09-24 | End: 2021-10-25 | Stop reason: SDUPTHER

## 2021-10-08 ENCOUNTER — TELEPHONE (OUTPATIENT)
Dept: PRIMARY CARE CLINIC | Age: 71
End: 2021-10-08

## 2021-10-08 NOTE — TELEPHONE ENCOUNTER
Pt called requesting 2 handicap placard scripts, He states his are going to  soon. He needs 2 for both vehicles.        Please advise       Please call pt when ready for , place up front when done

## 2021-10-12 ENCOUNTER — OFFICE VISIT (OUTPATIENT)
Dept: PRIMARY CARE CLINIC | Age: 71
End: 2021-10-12
Payer: COMMERCIAL

## 2021-10-12 ENCOUNTER — HOSPITAL ENCOUNTER (OUTPATIENT)
Age: 71
Setting detail: SPECIMEN
Discharge: HOME | End: 2021-10-12
Payer: COMMERCIAL

## 2021-10-12 VITALS
HEART RATE: 88 BPM | SYSTOLIC BLOOD PRESSURE: 126 MMHG | HEIGHT: 67 IN | WEIGHT: 266.2 LBS | DIASTOLIC BLOOD PRESSURE: 74 MMHG | BODY MASS INDEX: 41.78 KG/M2 | OXYGEN SATURATION: 95 %

## 2021-10-12 DIAGNOSIS — R05.9 COUGH: Primary | ICD-10-CM

## 2021-10-12 PROCEDURE — 99214 OFFICE O/P EST MOD 30 MIN: CPT | Performed by: PHYSICIAN ASSISTANT

## 2021-10-12 PROCEDURE — 1123F ACP DISCUSS/DSCN MKR DOCD: CPT | Performed by: PHYSICIAN ASSISTANT

## 2021-10-12 PROCEDURE — 3017F COLORECTAL CA SCREEN DOC REV: CPT | Performed by: PHYSICIAN ASSISTANT

## 2021-10-12 PROCEDURE — G8417 CALC BMI ABV UP PARAM F/U: HCPCS | Performed by: PHYSICIAN ASSISTANT

## 2021-10-12 PROCEDURE — 4004F PT TOBACCO SCREEN RCVD TLK: CPT | Performed by: PHYSICIAN ASSISTANT

## 2021-10-12 PROCEDURE — G8484 FLU IMMUNIZE NO ADMIN: HCPCS | Performed by: PHYSICIAN ASSISTANT

## 2021-10-12 PROCEDURE — 4040F PNEUMOC VAC/ADMIN/RCVD: CPT | Performed by: PHYSICIAN ASSISTANT

## 2021-10-12 PROCEDURE — G8428 CUR MEDS NOT DOCUMENT: HCPCS | Performed by: PHYSICIAN ASSISTANT

## 2021-10-12 RX ORDER — AZITHROMYCIN 250 MG/1
TABLET, FILM COATED ORAL
Qty: 1 PACKET | Refills: 0 | Status: SHIPPED | OUTPATIENT
Start: 2021-10-12 | End: 2021-10-22

## 2021-10-12 ASSESSMENT — ENCOUNTER SYMPTOMS
RHINORRHEA: 0
EYE DISCHARGE: 0
ABDOMINAL PAIN: 0
SINUS PRESSURE: 0
PHOTOPHOBIA: 0
COUGH: 1
SORE THROAT: 0
CHEST TIGHTNESS: 0
CONSTIPATION: 0
ABDOMINAL DISTENTION: 0
DIARRHEA: 1
SHORTNESS OF BREATH: 1
VOMITING: 0

## 2021-10-12 NOTE — PROGRESS NOTES
[facility] PRIMARY [facility]ARE  [address] [facility] [address]  Dept: 700 Yonathan Aguayo is a 66 y.o. male Established patient, who presents today for his medical conditions/complaints as noted below. Chief Complaint   Patient presents with    Cough    Other     sneezing    Diarrhea    Shortness of Breath       HPI:     HPI: The patient is having cough, SOB, diarrhea and sniffles. No sore throat, some loss of taste and smell. Patient has COPD and diabetes. Not using nebulizer says that it makes it worse. Reviewed prior notes None  Reviewed previous Labs    LDL Cholesterol (mg/dL)   Date Value   2021 25     LDL Calculated (mg/dL)   Date Value   2018 65       (goal LDL is <100)   AST (U/L)   Date Value   2021 18     ALT (U/L)   Date Value   2021 21     BUN (mg/dL)   Date Value   2021 17     Hemoglobin A1C (%)   Date Value   2021 6.0     TSH (mIU/L)   Date Value   2020 1.78     BP Readings from Last 3 Encounters:   10/12/21 126/74   21 118/76   21 (!) 151/83          (goal 120/80)    Past Medical History:   Diagnosis Date    Arthritis     [facility] [address]     Diabetes mellitus ([facility] [address]     GURPREET on CPAP       Past Surgical History:   Procedure Laterality Date    APPENDECTOMY      CHOLECYSTECTOMY, LAPAROSCOPIC  1996    JOINT REPLACEMENT Right 2017    LUMBAR LAMINECTOMY  10/24/2018    L2-L5 laminectomy, foraminotomy    SPINAL CORD STIMULATOR SURGERY Right 2021    TONSILLECTOMY      TOTAL KNEE ARTHROPLASTY Left 2017       No family history on file.     Social History     Tobacco Use    Smoking status: Current Every Day Smoker     Packs/day: 1.00     Years: 42.00     Pack years: 42.00     Types: Cigarettes     Last attempt to quit: 10/12/2017     Years since quittin.0    Smokeless tobacco: Never Used   Substance Use Topics    Alcohol use: No      Current Outpatient Medications   Medication Sig Dispense Refill    azithromycin (ZITHROMAX) 250 MG tablet 2 tablets now then 1 daily until gone. 1 packet 0    fluocinonide (LIDEX) 0.05 % cream apply topically twice a day as needed 60 g 0    oxyCODONE-acetaminophen (PERCOCET)  MG per tablet Take 1 tablet by mouth every 8 hours as needed for Pain for up to 30 days. 90 tablet 0    metaxalone (SKELAXIN) 800 MG tablet       metFORMIN (GLUCOPHAGE) 1000 MG tablet Take 1 tablet by mouth daily (with breakfast) 90 tablet 3    atorvastatin (LIPITOR) 20 MG tablet Take 1 tablet by mouth nightly 90 tablet 3    hydroCHLOROthiazide (HYDRODIURIL) 50 MG tablet Take 1 tablet by mouth daily 90 tablet 3    traZODone (DESYREL) 50 MG tablet Take 1 tablet by mouth nightly 90 tablet 2    benazepril (LOTENSIN) 20 MG tablet Take 1 tablet by mouth daily 90 tablet 2    vitamin D 25 MCG (1000 UT) CAPS Take 2 capsules by mouth daily 60 capsule 5    budesonide-formoterol (SYMBICORT) 160-4.5 MCG/ACT AERO Inhale 2 puffs into the lungs 2 times daily 10.2 g 5    gabapentin (NEURONTIN) 300 MG capsule Take 1 capsule by mouth 4 times daily for 30 days. 120 capsule 5    celecoxib (CELEBREX) 200 MG capsule TAKE 1 CAPSULE BY MOUTH EVERY DAY      ammonium lactate (LAC-HYDRIN) 12 % lotion Apply topically daily. 225 mL 2    albuterol sulfate HFA (PROAIR HFA) 108 (90 Base) MCG/ACT inhaler Inhale 2 puffs into the lungs every 6 hours as needed for Wheezing 8.5 g 5    ciclopirox (LOPROX) 0.77 % cream Apply topically 2 times daily. 30 g 1    Crisaborole 2 % OINT Apply 1 applicator topically 2 times daily 60 g 1    ipratropium-albuterol (DUONEB) 0.5-2.5 (3) MG/3ML SOLN nebulizer solution Inhale 3 mLs into the lungs every 6 hours as needed for Shortness of Breath 360 mL 1     No current facility-administered medications for this visit.      Allergies   Allergen Reactions    Cheratussin Ac [Guaifenesin-Codeine] Other (See Comments) Coughing Attack. Health Maintenance   Topic Date Due    Diabetic retinal exam  Never done    Shingles Vaccine (1 of 2) Never done    Low dose CT lung screening  06/23/2021    Flu vaccine (1) 09/01/2021    Diabetic microalbuminuria test  12/02/2021    Lipid screen  01/11/2022    Potassium monitoring  01/11/2022    Creatinine monitoring  01/11/2022    Diabetic foot exam  07/28/2022    A1C test (Diabetic or Prediabetic)  07/28/2022    Colon cancer screen fecal DNA test (Cologuard)  07/12/2023    DTaP/Tdap/Td vaccine (3 - Td or Tdap) 07/27/2027    Pneumococcal 65+ years Vaccine  Completed    COVID-19 Vaccine  Completed    AAA screen  Completed    Hepatitis C screen  Completed    Hepatitis A vaccine  Aged Out    Hib vaccine  Aged Out    Meningococcal (ACWY) vaccine  Aged Out       Subjective:      Review of Systems   Constitutional: Negative for chills, fever and unexpected weight change. HENT: Negative for congestion, hearing loss, rhinorrhea, sinus pressure and sore throat. Eyes: Negative for photophobia, discharge and visual disturbance. Respiratory: Positive for cough and shortness of breath. Negative for chest tightness. Cardiovascular: Negative for chest pain, palpitations and leg swelling. Gastrointestinal: Positive for diarrhea. Negative for abdominal distention, abdominal pain, constipation and vomiting. Endocrine: Negative for polydipsia and polyuria. Genitourinary: Negative for decreased urine volume, difficulty urinating, frequency and urgency. Musculoskeletal: Negative for arthralgias, gait problem and myalgias. Skin: Negative for rash. Allergic/Immunologic: Negative for food allergies. Neurological: Negative for dizziness, weakness, numbness and headaches. Hematological: Negative for adenopathy. Psychiatric/Behavioral: Negative for dysphoric mood and sleep disturbance. The patient is not nervous/anxious.         Objective:     /74   Pulse 88 maintenance. Instructed to continue current medications, diet and exercise. Patient agreed with treatment plan. Follow up as directed.      Electronically signed by JEREMY Hernandez on 10/12/2021 at 10:40 AM

## 2021-10-13 ENCOUNTER — HOSPITAL ENCOUNTER (OUTPATIENT)
Age: 71
Discharge: HOME | End: 2021-10-15
Payer: COMMERCIAL

## 2021-10-13 ENCOUNTER — HOSPITAL ENCOUNTER (OUTPATIENT)
Dept: GENERAL RADIOLOGY | Age: 71
Discharge: HOME | End: 2021-10-15
Payer: COMMERCIAL

## 2021-10-13 DIAGNOSIS — R05.9 COUGH: ICD-10-CM

## 2021-10-13 PROCEDURE — 71046 X-RAY EXAM CHEST 2 VIEWS: CPT

## 2021-10-14 LAB
SARS-COV-2: NORMAL
SARS-COV-2: NOT DETECTED
SOURCE: NORMAL

## 2021-10-22 DIAGNOSIS — M54.50 ACUTE MIDLINE LOW BACK PAIN WITHOUT SCIATICA: ICD-10-CM

## 2021-10-25 RX ORDER — OXYCODONE AND ACETAMINOPHEN 10; 325 MG/1; MG/1
1 TABLET ORAL EVERY 8 HOURS PRN
Qty: 90 TABLET | Refills: 0 | Status: SHIPPED | OUTPATIENT
Start: 2021-10-25 | End: 2021-11-24 | Stop reason: SDUPTHER

## 2021-11-01 ENCOUNTER — OFFICE VISIT (OUTPATIENT)
Dept: PRIMARY CARE CLINIC | Age: 71
End: 2021-11-01
Payer: COMMERCIAL

## 2021-11-01 VITALS
HEART RATE: 88 BPM | OXYGEN SATURATION: 93 % | WEIGHT: 276.2 LBS | DIASTOLIC BLOOD PRESSURE: 70 MMHG | BODY MASS INDEX: 43.35 KG/M2 | HEIGHT: 67 IN | SYSTOLIC BLOOD PRESSURE: 126 MMHG

## 2021-11-01 DIAGNOSIS — J44.9 CHRONIC OBSTRUCTIVE PULMONARY DISEASE, UNSPECIFIED COPD TYPE (HCC): ICD-10-CM

## 2021-11-01 DIAGNOSIS — E11.9 CONTROLLED TYPE 2 DIABETES MELLITUS WITHOUT COMPLICATION, WITHOUT LONG-TERM CURRENT USE OF INSULIN (HCC): Primary | ICD-10-CM

## 2021-11-01 DIAGNOSIS — L21.9 SEBORRHEIC DERMATITIS: ICD-10-CM

## 2021-11-01 DIAGNOSIS — Z23 NEED FOR VACCINATION: ICD-10-CM

## 2021-11-01 DIAGNOSIS — Z87.891 PERSONAL HISTORY OF TOBACCO USE: ICD-10-CM

## 2021-11-01 DIAGNOSIS — J40 BRONCHITIS: ICD-10-CM

## 2021-11-01 DIAGNOSIS — Z79.899 HIGH RISK MEDICATION USE: ICD-10-CM

## 2021-11-01 DIAGNOSIS — F51.01 PRIMARY INSOMNIA: ICD-10-CM

## 2021-11-01 LAB
ALCOHOL URINE: NORMAL
AMPHETAMINE SCREEN, URINE: NORMAL
BARBITURATE SCREEN, URINE: NORMAL
BENZODIAZEPINE SCREEN, URINE: NORMAL
BUPRENORPHINE URINE: NORMAL
COCAINE METABOLITE SCREEN URINE: NORMAL
FENTANYL SCREEN, URINE: NORMAL
GABAPENTIN SCREEN, URINE: NORMAL
HBA1C MFR BLD: 6.1 %
MDMA URINE: NORMAL
METHADONE SCREEN, URINE: NORMAL
METHAMPHETAMINE, URINE: NORMAL
OPIATE SCREEN URINE: NORMAL
OXYCODONE SCREEN URINE: POSITIVE
PHENCYCLIDINE SCREEN URINE: NORMAL
PROPOXYPHENE SCREEN, URINE: NORMAL
SYNTHETIC CANNABINOIDS(K2) SCREEN, URINE: NORMAL
THC SCREEN, URINE: NORMAL
TRAMADOL SCREEN URINE: NORMAL
TRICYCLIC ANTIDEPRESSANTS, UR: NORMAL

## 2021-11-01 PROCEDURE — 4040F PNEUMOC VAC/ADMIN/RCVD: CPT | Performed by: FAMILY MEDICINE

## 2021-11-01 PROCEDURE — G8417 CALC BMI ABV UP PARAM F/U: HCPCS | Performed by: FAMILY MEDICINE

## 2021-11-01 PROCEDURE — G0008 ADMIN INFLUENZA VIRUS VAC: HCPCS | Performed by: FAMILY MEDICINE

## 2021-11-01 PROCEDURE — 4004F PT TOBACCO SCREEN RCVD TLK: CPT | Performed by: FAMILY MEDICINE

## 2021-11-01 PROCEDURE — 99214 OFFICE O/P EST MOD 30 MIN: CPT | Performed by: FAMILY MEDICINE

## 2021-11-01 PROCEDURE — 3044F HG A1C LEVEL LT 7.0%: CPT | Performed by: FAMILY MEDICINE

## 2021-11-01 PROCEDURE — 80305 DRUG TEST PRSMV DIR OPT OBS: CPT | Performed by: FAMILY MEDICINE

## 2021-11-01 PROCEDURE — 2022F DILAT RTA XM EVC RTNOPTHY: CPT | Performed by: FAMILY MEDICINE

## 2021-11-01 PROCEDURE — G0296 VISIT TO DETERM LDCT ELIG: HCPCS | Performed by: FAMILY MEDICINE

## 2021-11-01 PROCEDURE — G8427 DOCREV CUR MEDS BY ELIG CLIN: HCPCS | Performed by: FAMILY MEDICINE

## 2021-11-01 PROCEDURE — 3023F SPIROM DOC REV: CPT | Performed by: FAMILY MEDICINE

## 2021-11-01 PROCEDURE — 1123F ACP DISCUSS/DSCN MKR DOCD: CPT | Performed by: FAMILY MEDICINE

## 2021-11-01 PROCEDURE — 3017F COLORECTAL CA SCREEN DOC REV: CPT | Performed by: FAMILY MEDICINE

## 2021-11-01 PROCEDURE — 90694 VACC AIIV4 NO PRSRV 0.5ML IM: CPT | Performed by: FAMILY MEDICINE

## 2021-11-01 PROCEDURE — G8484 FLU IMMUNIZE NO ADMIN: HCPCS | Performed by: FAMILY MEDICINE

## 2021-11-01 PROCEDURE — G8926 SPIRO NO PERF OR DOC: HCPCS | Performed by: FAMILY MEDICINE

## 2021-11-01 PROCEDURE — 83036 HEMOGLOBIN GLYCOSYLATED A1C: CPT | Performed by: FAMILY MEDICINE

## 2021-11-01 RX ORDER — TRAZODONE HYDROCHLORIDE 50 MG/1
50 TABLET ORAL NIGHTLY
Qty: 90 TABLET | Refills: 3 | Status: SHIPPED | OUTPATIENT
Start: 2021-11-01 | End: 2022-02-01

## 2021-11-01 RX ORDER — DOXYCYCLINE HYCLATE 100 MG
100 TABLET ORAL 2 TIMES DAILY
Qty: 20 TABLET | Refills: 0 | Status: SHIPPED | OUTPATIENT
Start: 2021-11-01 | End: 2021-11-11

## 2021-11-01 RX ORDER — FLUOCINONIDE TOPICAL SOLUTION USP, 0.05% 0.5 MG/ML
SOLUTION TOPICAL
Qty: 60 ML | Refills: 2 | Status: SHIPPED | OUTPATIENT
Start: 2021-11-01 | End: 2022-02-01

## 2021-11-01 ASSESSMENT — PATIENT HEALTH QUESTIONNAIRE - PHQ9
SUM OF ALL RESPONSES TO PHQ QUESTIONS 1-9: 0
1. LITTLE INTEREST OR PLEASURE IN DOING THINGS: 0
SUM OF ALL RESPONSES TO PHQ QUESTIONS 1-9: 0
SUM OF ALL RESPONSES TO PHQ9 QUESTIONS 1 & 2: 0
SUM OF ALL RESPONSES TO PHQ QUESTIONS 1-9: 0
2. FEELING DOWN, DEPRESSED OR HOPELESS: 0

## 2021-11-01 ASSESSMENT — ENCOUNTER SYMPTOMS
VOMITING: 0
ABDOMINAL PAIN: 0
DIARRHEA: 0
EYE DISCHARGE: 0
WHEEZING: 0
EYE REDNESS: 0
RHINORRHEA: 0
SORE THROAT: 0
NAUSEA: 0
COUGH: 0
SHORTNESS OF BREATH: 0

## 2021-11-01 NOTE — PROGRESS NOTES
Types: Cigarettes     Last attempt to quit: 10/12/2017     Years since quittin.0    Smokeless tobacco: Never Used   Substance Use Topics    Alcohol use: No      Current Outpatient Medications   Medication Sig Dispense Refill    traZODone (DESYREL) 50 MG tablet Take 1 tablet by mouth nightly 90 tablet 3    fluocinonide (LIDEX) 0.05 % external solution Apply topically 4 drops to each ear 2 times daily. 60 mL 2    doxycycline hyclate (VIBRA-TABS) 100 MG tablet Take 1 tablet by mouth 2 times daily for 10 days 20 tablet 0    oxyCODONE-acetaminophen (PERCOCET)  MG per tablet Take 1 tablet by mouth every 8 hours as needed for Pain for up to 30 days. 90 tablet 0    fluocinonide (LIDEX) 0.05 % cream apply topically twice a day as needed 60 g 0    metaxalone (SKELAXIN) 800 MG tablet       metFORMIN (GLUCOPHAGE) 1000 MG tablet Take 1 tablet by mouth daily (with breakfast) 90 tablet 3    atorvastatin (LIPITOR) 20 MG tablet Take 1 tablet by mouth nightly 90 tablet 3    hydroCHLOROthiazide (HYDRODIURIL) 50 MG tablet Take 1 tablet by mouth daily 90 tablet 3    benazepril (LOTENSIN) 20 MG tablet Take 1 tablet by mouth daily 90 tablet 2    vitamin D 25 MCG (1000 UT) CAPS Take 2 capsules by mouth daily 60 capsule 5    budesonide-formoterol (SYMBICORT) 160-4.5 MCG/ACT AERO Inhale 2 puffs into the lungs 2 times daily 10.2 g 5    celecoxib (CELEBREX) 200 MG capsule TAKE 1 CAPSULE BY MOUTH EVERY DAY      ammonium lactate (LAC-HYDRIN) 12 % lotion Apply topically daily. 225 mL 2    albuterol sulfate HFA (PROAIR HFA) 108 (90 Base) MCG/ACT inhaler Inhale 2 puffs into the lungs every 6 hours as needed for Wheezing 8.5 g 5    ciclopirox (LOPROX) 0.77 % cream Apply topically 2 times daily.  30 g 1    Crisaborole 2 % OINT Apply 1 applicator topically 2 times daily 60 g 1    ipratropium-albuterol (DUONEB) 0.5-2.5 (3) MG/3ML SOLN nebulizer solution Inhale 3 mLs into the lungs every 6 hours as needed for Shortness of Breath 360 mL 1    gabapentin (NEURONTIN) 300 MG capsule Take 1 capsule by mouth 4 times daily for 30 days. 120 capsule 5     No current facility-administered medications for this visit. Allergies   Allergen Reactions    Cheratussin Ac [Guaifenesin-Codeine] Other (See Comments)     Coughing Attack. Health Maintenance   Topic Date Due    Diabetic retinal exam  Never done    Shingles Vaccine (1 of 2) Never done    Low dose CT lung screening  06/23/2021    Flu vaccine (1) 09/01/2021    Diabetic microalbuminuria test  12/02/2021    Lipid screen  01/11/2022    Potassium monitoring  01/11/2022    Creatinine monitoring  01/11/2022    Diabetic foot exam  07/28/2022    A1C test (Diabetic or Prediabetic)  11/01/2022    Colon cancer screen fecal DNA test (Cologuard)  07/12/2023    DTaP/Tdap/Td vaccine (3 - Td or Tdap) 07/27/2027    Pneumococcal 65+ years Vaccine  Completed    COVID-19 Vaccine  Completed    AAA screen  Completed    Hepatitis C screen  Completed    Hepatitis A vaccine  Aged Out    Hib vaccine  Aged Out    Meningococcal (ACWY) vaccine  Aged Out       Subjective:      Review of Systems   Constitutional: Negative for chills and fever. HENT: Negative for rhinorrhea and sore throat. Eyes: Negative for discharge and redness. Respiratory: Negative for cough, shortness of breath and wheezing. Cardiovascular: Negative for chest pain and palpitations. Gastrointestinal: Negative for abdominal pain, diarrhea, nausea and vomiting. Genitourinary: Negative for dysuria and frequency. Musculoskeletal: Negative for arthralgias and myalgias. Neurological: Negative for dizziness, light-headedness and headaches. Psychiatric/Behavioral: Negative for sleep disturbance. Objective:     /70   Pulse 88   Ht 5' 6.96\" (1.701 m)   Wt 276 lb 3.2 oz (125.3 kg)   SpO2 93%   BMI 43.31 kg/m²   Physical Exam  Vitals and nursing note reviewed.    Constitutional:       General: He is not in acute distress. Appearance: He is well-developed. He is not ill-appearing. HENT:      Head: Normocephalic and atraumatic. Right Ear: External ear normal.      Left Ear: External ear normal.   Eyes:      General: No scleral icterus. Right eye: No discharge. Left eye: No discharge. Conjunctiva/sclera: Conjunctivae normal.      Pupils: Pupils are equal, round, and reactive to light. Neck:      Thyroid: No thyromegaly. Trachea: No tracheal deviation. Cardiovascular:      Rate and Rhythm: Normal rate and regular rhythm. Heart sounds: Normal heart sounds. Pulmonary:      Effort: Pulmonary effort is normal. No respiratory distress. Breath sounds: Normal breath sounds. No wheezing. Lymphadenopathy:      Cervical: No cervical adenopathy. Skin:     General: Skin is warm. Findings: No rash. Neurological:      Mental Status: He is alert and oriented to person, place, and time. Psychiatric:         Mood and Affect: Mood normal.         Behavior: Behavior normal.         Thought Content: Thought content normal.       Controlled Substance Monitoring:    Acute and Chronic Pain Monitoring:   RX Monitoring 11/1/2021   Attestation -   Acute Pain Prescriptions -   Periodic Controlled Substance Monitoring Possible medication side effects, risk of tolerance/dependence & alternative treatments discussed. ;No signs of potential drug abuse or diversion identified. ;Random urine drug screen sent today. Chronic Pain > 50 MEDD Obtained or confirmed \"Consent for Opioid Use\" on file. Chronic Pain > 80 MEDD -   Chronic Pain > 120 MEDD Obtained or confirmed \"Medication Contract\" on file. Assessment:       Diagnosis Orders   1. Controlled type 2 diabetes mellitus without complication, without long-term current use of insulin (HCC)  POCT glycosylated hemoglobin (Hb A1C)   2.  Need for vaccination  INFLUENZA, QUADV, ADJUVANTED, 65 YRS =, IM, PF, PREFILL SYR, 0.5ML (FLUAD)   3. Primary insomnia  traZODone (DESYREL) 50 MG tablet   4. High risk medication use  POCT Rapid Drug Screen   5. Chronic obstructive pulmonary disease, unspecified COPD type (HCC)     6. Seborrheic dermatitis  fluocinonide (LIDEX) 0.05 % external solution   7. Personal history of tobacco use  OR VISIT TO DISCUSS LUNG CA SCREEN W LDCT    CT Lung Screen (Annual)   8. Bronchitis  doxycycline hyclate (VIBRA-TABS) 100 MG tablet        Plan:    Low-dose CT chest.  Third Covid shot recommended. Flu shot today. Continue diabetic medications as is. Urine drug screen today. Doxycycline for bronchitis  Lidex solution for dry scaly skin in the external ear canals. Return in about 3 months (around 2022). Orders Placed This Encounter   Procedures    CT Lung Screen (Annual)     Age: Patient is 66 y.o. Smoking History: Social History    Tobacco Use      Smoking status: Current Every Day Smoker        Packs/day: 1.00        Years: 42.00        Pack years: 43        Types: Cigarettes        Quit date: 10/12/2017        Years since quittin.0      Smokeless tobacco: Never Used    Alcohol use: No    Drug use: No   Pack years: 43    Date of last lung cancer screenin/3/2019     Standing Status:   Future     Standing Expiration Date:   2023     Order Specific Question:   Is there documentation of shared decision making? Answer:   Yes     Order Specific Question:   Is this a low dose CT or a routine CT? Answer:   Low Dose CT [1]     Order Specific Question:   Is this the first (baseline) CT or an annual exam?     Answer: Annual [2]     Order Specific Question:   Does the patient show any signs or symptoms of lung cancer? Answer:   No     Order Specific Question:   Smoking Status? Answer:   Current Every Day Smoker [1]     Order Specific Question:   Smoking packs per day? Answer:   1     Order Specific Question:   Years smoking?      Answer:   43    INFLUENZA, QUADV, ADJUVANTED, 72 YRS =, IM, PF, PREFILL SYR, 0.5ML (FLUAD)    POCT glycosylated hemoglobin (Hb A1C)    POCT Rapid Drug Screen    [place] VISIT TO DISCUSS LUNG CA SCREEN W LDCT     Orders Placed This Encounter   Medications    traZODone (DESYREL) 50 MG tablet     Sig: Take 1 tablet by mouth nightly     Dispense:  90 tablet     Refill:  3    fluocinonide (LIDEX) 0.05 % external solution     Sig: Apply topically 4 drops to each ear 2 times daily. Dispense:  60 mL     Refill:  2    doxycycline hyclate (VIBRA-TABS) 100 MG tablet     Sig: Take 1 tablet by mouth 2 times daily for 10 days     Dispense:  20 tablet     Refill:  0       Patient given educational materials - see patient instructions. Discussed use, benefit, and side effects of prescribed medications. All patient questions answered. Pt voiced understanding. Reviewed health maintenance. Instructed to continue current medications, diet andexercise. Patient agreed with treatment plan. Follow up as directed. Electronicallysigned by Lorna Santamaria MD on 11/1/2021 at 4:10 PM    Low Dose CT (LDCT) Lung Screening criteria met:     Age 55-77(Medicare) or 50-80 ([facility])   Pack year smoking >30 (Medicare) or >20 ([facility])   Still smoking or less than 15 year since quit   No sign or symptoms of lung cancer   > 11 months since last LDCT     Risks and benefits of lung cancer screening with LDCT scans discussed:    Significance of positive screen - False-positive LDCT results often occur. 95% of all positive results do not lead to a diagnosis of cancer. Usually further imaging can resolve most false-positive results; however, some patients may require invasive procedures. Over diagnosis risk - 10% to 12% of screen-detected lung cancer cases are over diagnosed--that is, the cancer would not have been detected in the patient's lifetime without the screening.     Need for follow up screens annually to continue lung cancer screening effectiveness     Risks associated with radiation from annual LDCT- Radiation exposure is about the same as for a mammogram, which is about 1/3 of the annual background radiation exposure from everyday life. Starting screening at age 54 is not likely to increase cancer risk from radiation exposure. Patients with comorbidities resulting in life expectancy of < 10 years, or that would preclude treatment of an abnormality identified on CT, should not be screened due to lack of benefit.     To obtain maximal benefit from this screening, smoking cessation and long-term abstinence from smoking is critical

## 2021-11-23 DIAGNOSIS — M54.50 ACUTE MIDLINE LOW BACK PAIN WITHOUT SCIATICA: ICD-10-CM

## 2021-11-24 RX ORDER — OXYCODONE AND ACETAMINOPHEN 10; 325 MG/1; MG/1
1 TABLET ORAL EVERY 8 HOURS PRN
Qty: 90 TABLET | Refills: 0 | Status: SHIPPED | OUTPATIENT
Start: 2021-11-24 | End: 2021-12-20 | Stop reason: SDUPTHER

## 2021-11-30 DIAGNOSIS — Z12.11 ENCOUNTER FOR SCREENING FOR MALIGNANT NEOPLASM OF COLON: ICD-10-CM

## 2021-12-16 ENCOUNTER — TELEPHONE (OUTPATIENT)
Dept: ONCOLOGY | Age: 71
End: 2021-12-16

## 2021-12-20 DIAGNOSIS — M54.50 ACUTE MIDLINE LOW BACK PAIN WITHOUT SCIATICA: ICD-10-CM

## 2021-12-20 RX ORDER — OXYCODONE AND ACETAMINOPHEN 10; 325 MG/1; MG/1
1 TABLET ORAL EVERY 8 HOURS PRN
Qty: 90 TABLET | Refills: 0 | Status: SHIPPED | OUTPATIENT
Start: 2021-12-20 | End: 2021-12-22 | Stop reason: SDUPTHER

## 2021-12-22 DIAGNOSIS — M54.50 ACUTE MIDLINE LOW BACK PAIN WITHOUT SCIATICA: ICD-10-CM

## 2021-12-22 RX ORDER — OXYCODONE AND ACETAMINOPHEN 10; 325 MG/1; MG/1
1 TABLET ORAL EVERY 8 HOURS PRN
Qty: 90 TABLET | Refills: 0 | Status: SHIPPED | OUTPATIENT
Start: 2021-12-22 | End: 2022-01-21 | Stop reason: SDUPTHER

## 2021-12-29 ENCOUNTER — HOSPITAL ENCOUNTER (OUTPATIENT)
Dept: CT IMAGING | Age: 71
Discharge: HOME | End: 2021-12-31
Payer: COMMERCIAL

## 2021-12-29 DIAGNOSIS — Z87.891 PERSONAL HISTORY OF TOBACCO USE: ICD-10-CM

## 2021-12-29 PROCEDURE — 71271 CT THORAX LUNG CANCER SCR C-: CPT

## 2022-01-21 DIAGNOSIS — M54.50 ACUTE MIDLINE LOW BACK PAIN WITHOUT SCIATICA: ICD-10-CM

## 2022-01-21 RX ORDER — OXYCODONE AND ACETAMINOPHEN 10; 325 MG/1; MG/1
1 TABLET ORAL EVERY 8 HOURS PRN
Qty: 90 TABLET | Refills: 0 | Status: SHIPPED | OUTPATIENT
Start: 2022-01-21 | End: 2022-02-21 | Stop reason: SDUPTHER

## 2022-02-01 ENCOUNTER — OFFICE VISIT (OUTPATIENT)
Dept: PRIMARY CARE CLINIC | Age: 72
End: 2022-02-01
Payer: COMMERCIAL

## 2022-02-01 ENCOUNTER — IMMUNIZATION (OUTPATIENT)
Dept: PRIMARY CARE CLINIC | Age: 72
End: 2022-02-01
Payer: MEDICARE

## 2022-02-01 VITALS
OXYGEN SATURATION: 95 % | WEIGHT: 289 LBS | SYSTOLIC BLOOD PRESSURE: 134 MMHG | DIASTOLIC BLOOD PRESSURE: 72 MMHG | HEIGHT: 67 IN | HEART RATE: 67 BPM | BODY MASS INDEX: 45.36 KG/M2

## 2022-02-01 DIAGNOSIS — E11.9 CONTROLLED TYPE 2 DIABETES MELLITUS WITHOUT COMPLICATION, WITHOUT LONG-TERM CURRENT USE OF INSULIN (HCC): ICD-10-CM

## 2022-02-01 DIAGNOSIS — Z23 NEED FOR VACCINATION: Primary | ICD-10-CM

## 2022-02-01 DIAGNOSIS — Z00.00 ANNUAL PHYSICAL EXAM: ICD-10-CM

## 2022-02-01 DIAGNOSIS — Z13.6 ENCOUNTER FOR LIPID SCREENING FOR CARDIOVASCULAR DISEASE: ICD-10-CM

## 2022-02-01 DIAGNOSIS — J44.9 CHRONIC OBSTRUCTIVE PULMONARY DISEASE, UNSPECIFIED COPD TYPE (HCC): ICD-10-CM

## 2022-02-01 DIAGNOSIS — Z13.220 ENCOUNTER FOR LIPID SCREENING FOR CARDIOVASCULAR DISEASE: ICD-10-CM

## 2022-02-01 DIAGNOSIS — R60.0 LOCALIZED EDEMA: ICD-10-CM

## 2022-02-01 DIAGNOSIS — Z12.5 SCREENING PSA (PROSTATE SPECIFIC ANTIGEN): ICD-10-CM

## 2022-02-01 DIAGNOSIS — Z00.00 ROUTINE GENERAL MEDICAL EXAMINATION AT A HEALTH CARE FACILITY: Primary | ICD-10-CM

## 2022-02-01 LAB
CREATININE URINE POCT: NORMAL
HBA1C MFR BLD: 5.7 %
MICROALBUMIN/CREAT 24H UR: NORMAL MG/G{CREAT}
MICROALBUMIN/CREAT UR-RTO: NORMAL

## 2022-02-01 PROCEDURE — 0004A COVID-19, PFIZER PURPLE TOP, DILUTE FOR USE, 12+ YRS, 30MCG/0.3ML DOSE: CPT | Performed by: FAMILY MEDICINE

## 2022-02-01 PROCEDURE — 82044 UR ALBUMIN SEMIQUANTITATIVE: CPT | Performed by: FAMILY MEDICINE

## 2022-02-01 PROCEDURE — 4040F PNEUMOC VAC/ADMIN/RCVD: CPT | Performed by: FAMILY MEDICINE

## 2022-02-01 PROCEDURE — G8484 FLU IMMUNIZE NO ADMIN: HCPCS | Performed by: FAMILY MEDICINE

## 2022-02-01 PROCEDURE — 83036 HEMOGLOBIN GLYCOSYLATED A1C: CPT | Performed by: FAMILY MEDICINE

## 2022-02-01 PROCEDURE — 3044F HG A1C LEVEL LT 7.0%: CPT | Performed by: FAMILY MEDICINE

## 2022-02-01 PROCEDURE — 91300 COVID-19, PFIZER PURPLE TOP, DILUTE FOR USE, 12+ YRS, 30MCG/0.3ML DOSE: CPT | Performed by: FAMILY MEDICINE

## 2022-02-01 PROCEDURE — 3017F COLORECTAL CA SCREEN DOC REV: CPT | Performed by: FAMILY MEDICINE

## 2022-02-01 PROCEDURE — G0439 PPPS, SUBSEQ VISIT: HCPCS | Performed by: FAMILY MEDICINE

## 2022-02-01 PROCEDURE — 1123F ACP DISCUSS/DSCN MKR DOCD: CPT | Performed by: FAMILY MEDICINE

## 2022-02-01 RX ORDER — FUROSEMIDE 20 MG/1
20 TABLET ORAL 2 TIMES DAILY
Qty: 180 TABLET | Refills: 3 | Status: SHIPPED | OUTPATIENT
Start: 2022-02-01

## 2022-02-01 RX ORDER — BUDESONIDE AND FORMOTEROL FUMARATE DIHYDRATE 160; 4.5 UG/1; UG/1
2 AEROSOL RESPIRATORY (INHALATION) 2 TIMES DAILY
Qty: 10.2 G | Refills: 5 | Status: SHIPPED | OUTPATIENT
Start: 2022-02-01

## 2022-02-01 RX ORDER — GABAPENTIN 300 MG/1
300 CAPSULE ORAL 4 TIMES DAILY
Qty: 360 CAPSULE | Refills: 1
Start: 2022-02-01 | End: 2022-02-24 | Stop reason: SDUPTHER

## 2022-02-01 ASSESSMENT — PATIENT HEALTH QUESTIONNAIRE - PHQ9
SUM OF ALL RESPONSES TO PHQ QUESTIONS 1-9: 0
2. FEELING DOWN, DEPRESSED OR HOPELESS: 0
SUM OF ALL RESPONSES TO PHQ QUESTIONS 1-9: 0
1. LITTLE INTEREST OR PLEASURE IN DOING THINGS: 0
SUM OF ALL RESPONSES TO PHQ9 QUESTIONS 1 & 2: 0
SUM OF ALL RESPONSES TO PHQ QUESTIONS 1-9: 0
SUM OF ALL RESPONSES TO PHQ QUESTIONS 1-9: 0

## 2022-02-01 NOTE — PROGRESS NOTES
After obtaining consent, and per orders of Dr. Payton Gallo, injection of Villatoro Peter booster given in Left deltoid by Jaqueline Cottrell. Patient instructed to remain in clinic for 20 minutes afterwards, and to report any adverse reaction to me immediately.

## 2022-02-01 NOTE — PROGRESS NOTES
Medicare Annual Wellness Visit  Name: Sudhir Boyd JWKLDH Date: 2022   MRN: [id number] Sex: Male   Age: 66 y.o. Ethnicity: Non- / Non    : 1950 Race: White (non-)      Dirk Narayan Sr. is here for Medicare AWV    Screenings for behavioral, psychosocial and functional/safety risks, and cognitive dysfunction are all negative except as indicated below. These results, as well as other patient data from the [address] form, are documented in Flowsheets linked to this Encounter. Allergies   Allergen Reactions    Cheratussin Ac [Guaifenesin-Codeine] Other (See Comments)     Coughing Attack. Prior to Visit Medications    Medication Sig Taking? Authorizing Provider   Multiple Vitamins-Minerals (CENTRUM SILVER PO) Take by mouth Yes Historical Provider, MD   gabapentin (NEURONTIN) 300 MG capsule Take 1 capsule by mouth 4 times daily for 30 days. Yes Jason Leos MD   budesonide-formoterol [facility]) 160-4.5 MCG/ACT AERO Inhale 2 puffs into the lungs 2 times daily Yes Jason Leos MD   furosemide (LASIX) 20 MG tablet Take 1 tablet by mouth 2 times daily Yes Jason Leos MD   oxyCODONE-acetaminophen (PERCOCET)  MG per tablet Take 1 tablet by mouth every 8 hours as needed for Pain for up to 30 days.  Yes Jason Leos MD   metFORMIN (GLUCOPHAGE) 1000 MG tablet Take 1 tablet by mouth daily (with breakfast) Yes Jason Leos MD   atorvastatin (LIPITOR) 20 MG tablet Take 1 tablet by mouth nightly Yes Jason Leos MD   hydroCHLOROthiazide (HYDRODIURIL) 50 MG tablet Take 1 tablet by mouth daily Yes Jason Leos MD   benazepril (LOTENSIN) 20 MG tablet Take 1 tablet by mouth daily Yes Jason Leos MD   vitamin D 25 MCG (1000 UT) CAPS Take 2 capsules by mouth daily Yes Jason Leos MD   celecoxib (CELEBREX) 200 MG capsule TAKE 1 CAPSULE BY MOUTH EVERY DAY Yes Historical Provider, MD   albuterol sulfate HFA (PROAIR HFA) 108 (90 Base) MCG/ACT inhaler Inhale 2 puffs into the lungs every 6 hours as needed for Wheezing Yes Marycruz Pedraza MD   ipratropium-albuterol (DUONEB) 0.5-2.5 (3) MG/3ML SOLN nebulizer solution Inhale 3 mLs into the lungs every 6 hours as needed for Shortness of Breath  Patient not taking: Reported on 2/1/2022  Marycruz Pedraza MD         Past Medical History:   Diagnosis Date    Arthritis     COPD [facility] obstructive [facility] disease) [facility]     Diabetes mellitus ([facility]     GURPREET on CPAP        Past Surgical History:   Procedure Laterality Date    APPENDECTOMY  1952    CHOLECYSTECTOMY, LAPAROSCOPIC  1996    JOINT REPLACEMENT Right 11/07/2017    LUMBAR LAMINECTOMY  10/24/2018    L2-L5 laminectomy, foraminotomy    SPINAL CORD STIMULATOR SURGERY Right 05/2021    TONSILLECTOMY  1959    TOTAL KNEE ARTHROPLASTY Left 02/01/2017       No family history on file. CareTeam (Including outside providers/suppliers regularly involved in providing care):   Patient Care Team:  Marycruz Pedraza MD as PCP - General (Family Medicine)  Marycruz Pedraza MD as PCP - REHABILITATION [facility] Empaneled Provider    Wt Readings from Last 3 Encounters:   02/01/22 289 lb (131.1 kg)   11/01/21 276 lb 3.2 oz (125.3 kg)   10/12/21 266 lb 3.2 oz (120.7 kg)     Vitals:    02/01/22 1544   BP: 134/72   Pulse: 67   SpO2: 95%   Weight: 289 lb (131.1 kg)   Height: 5' 6.96\" (1.701 m)     Body mass index is 45.32 kg/m². Based upon direct observation of the patient, evaluation of cognition reveals recent and remote memory intact.     General Appearance: alert and oriented to person, place and time, well developed and well- nourished, in no acute distress  Skin: warm and dry, no rash or erythema  Head: normocephalic and atraumatic  Eyes: pupils equal, round, and reactive to light, extraocular eye movements intact, conjunctivae normal  ENT: tympanic membrane, external ear and ear canal normal bilaterally, nose without deformity, nasal mucosa and turbinates normal without polyps  Neck: supple and non-tender without mass, no thyromegaly or thyroid nodules, no cervical lymphadenopathy  Pulmonary/Chest: clear to auscultation bilaterally- no wheezes, rales or rhonchi, normal air movement, no respiratory distress  Cardiovascular: normal rate, regular rhythm, normal S1 and S2, no murmurs, rubs, clicks, or gallops, distal pulses intact, no carotid bruits  Abdomen: soft, non-tender, non-distended, normal bowel sounds, no masses or organomegaly  Extremities: no cyanosis, clubbing. 2+ pitting edema bilateral  Musculoskeletal: normal range of motion, no joint swelling, deformity or tenderness  Neurologic: reflexes normal and symmetric, no cranial nerve deficit, gait, coordination and speech normal    Patient's complete Health Risk Assessment and screening values have been reviewed and are found in Flowsheets. The following problems were reviewed today and where indicated follow up appointments were made and/or referrals ordered. Positive Risk Factor Screenings with Interventions:         Substance History:  Social History     Tobacco History     Smoking Status  Current Every Day Smoker Last attempt to quit  10/12/2017 Smoking Frequency  1 pack/day for 42 years (42 pk yrs) Smoking Tobacco Type  Cigarettes    Smokeless Tobacco Use  Never Used          Alcohol History     Alcohol Use Status  No          Drug Use     Drug Use Status  No          Sexual Activity     Sexually Active  Not Asked               Alcohol Screening:       A score of 8 or more is associated with harmful or hazardous drinking. A score of 13 or more in women, and 15 or more in men, is likely to indicate alcohol dependence. Substance Abuse Interventions:  · Tobacco abuse:  patient is not ready to work toward tobacco cessation at this time       Opioid Risk: (Low risk score <55)  Opioid risk score: 41    Patient is low risk for opioid use disorder or overdose.     Click here to Document Controlled Substance Monitoring. Last PDMP Tami Nazario as Reviewed:  Review User Review Instant Review Result   Cosme Lantigua 2/1/2022  4:28 PM Reviewed PDMP [1]     Last Controlled Substance Monitoring Documentation      Office Visit from 11/1/2021 in [facility]   Periodic Controlled Substance Monitoring Possible medication side effects, risk of tolerance/dependence & alternative treatments discussed., No signs of potential drug abuse or diversion identified. , Random urine drug screen sent today. filed at 11/01/2021 1609   Chronic Pain > 50 MEDD Obtained or confirmed \"Consent for Opioid Use\" on file. filed at 11/01/2021 1609   Chronic Pain > 120 MEDD Obtained or confirmed \"Medication Contract\" on file. filed at 11/01/2021 [address] and ACP:  General  In general, how would you say your health is?: Fair  In the past 7 days, have you experienced any of the following?  New or Increased Pain, New or Increased Fatigue, Loneliness, Social Isolation, Stress or Anger?: (!) New or Increased Fatigue  Do you get the social and emotional support that you need?: Yes  Do you have a Living Will?: (!) No  Advance Directives     Power of [address] [place] [address] Will ACP-Advance Directive ACP-Power of     Not on File Not on File Not on File Not on File      General Health Risk Interventions:  · No Living Will: Advance Care Planning addressed with patient today    Health Habits/Nutrition:  Health Habits/Nutrition  Do you exercise for at least 20 minutes 2-3 times per week?: (!) No  Have you lost any weight without trying in the past 3 months?: No  Do you eat only one meal per day?: No  Have you seen the dentist within the past year?: Yes  Body mass index: (!) 45.31  Health Habits/Nutrition Interventions:  · Dental exam overdue:  patient encouraged to make appointment with his/her dentist         Personalized Preventive Plan   Current Health Maintenance Status  Immunization History Administered Date(s) Administered    COVID-19, Moderna, Primary or Immunocompromised, PF, 100mcg/0.5mL 02/04/2021, 03/04/2021    Influenza Virus Vaccine 09/28/2017    Influenza, High Dose (Fluzone 65 yrs and older) 09/01/2017, 11/06/2018, 12/04/2019    Influenza, High-dose, Quadv, 65 yrs +, IM (Fluzone) 09/28/2017    Influenza, Quadv, adjuvanted, 65 yrs +, IM, PF (Fluad) 09/23/2020, 11/01/2021    Pneumococcal Conjugate 13-valent ([id number]) 01/05/2016    Pneumococcal Polysaccharide ([id number]) 12/22/2014    Tdap (Boostrix, Adacel) 07/01/2010, 07/27/2017        Health Maintenance   Topic Date Due    Diabetic retinal exam  Never done    Shingles Vaccine (1 of 2) Never done    COVID-19 Vaccine (3 - Booster for Moderna series) 08/04/2021    Diabetic microalbuminuria test  12/02/2021    Potassium monitoring  01/11/2022    Creatinine monitoring  01/11/2022    Annual Wellness Visit (AWV)  01/14/2022    Lipid screen  01/11/2022    Diabetic foot exam  07/28/2022    Depression Screen  11/01/2022    Low dose CT lung screening  12/29/2022    A1C test (Diabetic or Prediabetic)  02/01/2023    Colon cancer screen fecal DNA test (Cologuard)  11/15/2024    DTaP/Tdap/Td vaccine (3 - Td or Tdap) 07/27/2027    Flu vaccine  Completed    Pneumococcal 65+ years Vaccine  Completed    AAA screen  Completed    Hepatitis C screen  Completed    Hepatitis A vaccine  Aged Out    Hib vaccine  Aged Out    Meningococcal (ACWY) vaccine  Aged Out     Recommendations for [employer] Due: see orders and patient instructions/AVS.  . Recommended screening schedule for the next 5-10 years is provided to the patient in written form: see Patient Orion Farias was seen today for medicare awv.     Diagnoses and all orders for this visit:    Routine general medical examination at a health care facility    Chronic obstructive pulmonary disease, unspecified COPD type ([facility] [address] [facility]  -     budesonide-formoterol (SYMBICORT) 160-4.5 MCG/ACT AERO; Inhale 2 puffs into the lungs 2 times daily  Stable. Patient has not been using his DuoNeb aerosols. States they make him cough more. Controlled type 2 diabetes mellitus without complication, without long-term current use of insulin ([facility])  -     POCT glycosylated hemoglobin (Hb A1C)  -     POCT microalbumin  Stable. Continue present medication as is    Body mass index (BMI) 45.0-49.9, adult ([facility])  Weight loss options discussed    Screening PSA (prostate specific antigen)  -     PSA screening; Future    Annual physical exam  -     Basic Metabolic Panel, Fasting; Future  -     Hepatic Function Panel; Future    Encounter for lipid screening for cardiovascular disease  -     Lipid, Fasting; Future    Localized edema  Start Lasix 20 mg twice daily    Other orders  -     gabapentin (NEURONTIN) 300 MG capsule; Take 1 capsule by mouth 4 times daily for 30 days. -     furosemide (LASIX) 20 MG tablet;  Take 1 tablet by mouth 2 times daily      Covid booster today

## 2022-02-01 NOTE — PATIENT INSTRUCTIONS
Personalized Preventive Plan for Popeye Ventura 468 2/1/2022  Medicare offers a range of preventive health benefits. Some of the tests and screenings are paid in full while other may be subject to a deductible, co-insurance, and/or copay. Some of these benefits include a comprehensive review of your medical history including lifestyle, illnesses that may run in your family, and various assessments and screenings as appropriate. After reviewing your medical record and screening and assessments performed today your provider may have ordered immunizations, labs, imaging, and/or referrals for you. A list of these orders (if applicable) as well as your Preventive Care list are included within your After Visit Summary for your review. Other Preventive Recommendations:    · A preventive eye exam performed by an eye specialist is recommended every 1-2 years to screen for glaucoma; cataracts, macular degeneration, and other eye disorders. · A preventive dental visit is recommended every 6 months. · Try to get at least 150 minutes of exercise per week or 10,000 steps per day on a pedometer . · Order or download the FREE \"Exercise & Physical Activity: Your Everyday Guide\" from [employer]. Call 0-940.841.1114 or search [employer] online. · You need 0652-2530 mg of calcium and 0056-9709 IU of vitamin D per day. It is possible to meet your calcium requirement with diet alone, but a vitamin D supplement is usually necessary to meet this goal.  · When exposed to the sun, use a sunscreen that protects against both UVA and UVB radiation with an SPF of 30 or greater. Reapply every 2 to 3 hours or after sweating, drying off with a towel, or swimming. · Always wear a seat belt when traveling in a car. Always wear a helmet when riding a bicycle or motorcycle.

## 2022-02-21 DIAGNOSIS — M54.50 ACUTE MIDLINE LOW BACK PAIN WITHOUT SCIATICA: ICD-10-CM

## 2022-02-21 RX ORDER — OXYCODONE AND ACETAMINOPHEN 10; 325 MG/1; MG/1
1 TABLET ORAL EVERY 8 HOURS PRN
Qty: 90 TABLET | Refills: 0 | Status: SHIPPED | OUTPATIENT
Start: 2022-02-21 | End: 2022-03-21 | Stop reason: SDUPTHER

## 2022-02-24 RX ORDER — GABAPENTIN 300 MG/1
300 CAPSULE ORAL 4 TIMES DAILY
Qty: 360 CAPSULE | Refills: 1 | Status: SHIPPED | OUTPATIENT
Start: 2022-02-24 | End: 2022-09-15

## 2022-03-21 DIAGNOSIS — M54.50 ACUTE MIDLINE LOW BACK PAIN WITHOUT SCIATICA: ICD-10-CM

## 2022-03-21 RX ORDER — OXYCODONE AND ACETAMINOPHEN 10; 325 MG/1; MG/1
1 TABLET ORAL EVERY 8 HOURS PRN
Qty: 90 TABLET | Refills: 0 | Status: SHIPPED | OUTPATIENT
Start: 2022-03-21 | End: 2022-04-19 | Stop reason: SDUPTHER

## 2022-03-27 ENCOUNTER — HOSPITAL ENCOUNTER (EMERGENCY)
Age: 72
Discharge: HOME | End: 2022-03-27
Attending: EMERGENCY MEDICINE
Payer: COMMERCIAL

## 2022-03-27 ENCOUNTER — APPOINTMENT (OUTPATIENT)
Dept: GENERAL RADIOLOGY | Age: 72
End: 2022-03-27
Payer: COMMERCIAL

## 2022-03-27 VITALS
HEART RATE: 85 BPM | BODY MASS INDEX: 43.16 KG/M2 | SYSTOLIC BLOOD PRESSURE: 108 MMHG | RESPIRATION RATE: 17 BRPM | TEMPERATURE: 98.1 F | HEIGHT: 67 IN | OXYGEN SATURATION: 93 % | WEIGHT: 275 LBS | DIASTOLIC BLOOD PRESSURE: 94 MMHG

## 2022-03-27 DIAGNOSIS — J44.1 COPD EXACERBATION (HCC): Primary | ICD-10-CM

## 2022-03-27 LAB
ABSOLUTE EOS #: 0.2 K/UL (ref 0–0.4)
ABSOLUTE LYMPH #: 2.4 K/UL (ref 1–4.8)
ABSOLUTE MONO #: 1 K/UL (ref 0.1–1.2)
ALBUMIN SERPL-MCNC: 3.8 G/DL (ref 3.5–5.2)
ALBUMIN/GLOBULIN RATIO: 1.1 (ref 1–2.5)
ALP BLD-CCNC: 77 U/L (ref 40–129)
ALT SERPL-CCNC: 28 U/L (ref 5–41)
ANION GAP SERPL CALCULATED.3IONS-SCNC: 11 MMOL/L (ref 9–17)
AST SERPL-CCNC: 24 U/L
BASOPHILS # BLD: 1 % (ref 0–2)
BASOPHILS ABSOLUTE: 0 K/UL (ref 0–0.2)
BILIRUB SERPL-MCNC: 0.26 MG/DL (ref 0.3–1.2)
BUN BLDV-MCNC: 11 MG/DL (ref 8–23)
CALCIUM SERPL-MCNC: 8.9 MG/DL (ref 8.6–10.4)
CHLORIDE BLD-SCNC: 99 MMOL/L (ref 98–107)
CO2: 25 MMOL/L (ref 20–31)
CREAT SERPL-MCNC: 0.77 MG/DL (ref 0.7–1.2)
EOSINOPHILS RELATIVE PERCENT: 3 % (ref 1–4)
GFR AFRICAN AMERICAN: >60 ML/MIN
GFR NON-AFRICAN AMERICAN: >60 ML/MIN
GFR SERPL CREATININE-BSD FRML MDRD: ABNORMAL ML/MIN/{1.73_M2}
GLUCOSE BLD-MCNC: 119 MG/DL (ref 70–99)
HCT VFR BLD CALC: 47.5 % (ref 41–53)
HEMOGLOBIN: 15.7 G/DL (ref 13.5–17.5)
LYMPHOCYTES # BLD: 27 % (ref 24–44)
MCH RBC QN AUTO: 30.9 PG (ref 26–34)
MCHC RBC AUTO-ENTMCNC: 33.1 G/DL (ref 31–37)
MCV RBC AUTO: 93.3 FL (ref 80–100)
MONOCYTES # BLD: 12 % (ref 2–11)
PDW BLD-RTO: 14.1 % (ref 12.5–15.4)
PLATELET # BLD: 252 K/UL (ref 140–450)
PMV BLD AUTO: 6.9 FL (ref 6–12)
POTASSIUM SERPL-SCNC: 4 MMOL/L (ref 3.7–5.3)
PRO-BNP: 65 PG/ML
RBC # BLD: 5.09 M/UL (ref 4.5–5.9)
SARS-COV-2, RAPID: NOT DETECTED
SEG NEUTROPHILS: 57 % (ref 36–66)
SEGMENTED NEUTROPHILS ABSOLUTE COUNT: 5.3 K/UL (ref 1.8–7.7)
SODIUM BLD-SCNC: 135 MMOL/L (ref 135–144)
SPECIMEN DESCRIPTION: NORMAL
TOTAL PROTEIN: 7.2 G/DL (ref 6.4–8.3)
TROPONIN, HIGH SENSITIVITY: 18 NG/L (ref 0–22)
WBC # BLD: 9 K/UL (ref 3.5–11)

## 2022-03-27 PROCEDURE — 71045 X-RAY EXAM CHEST 1 VIEW: CPT

## 2022-03-27 PROCEDURE — 87635 SARS-COV-2 COVID-19 AMP PRB: CPT

## 2022-03-27 PROCEDURE — 99284 EMERGENCY DEPT VISIT MOD MDM: CPT

## 2022-03-27 PROCEDURE — 6370000000 HC RX 637 (ALT 250 FOR IP): Performed by: PHYSICIAN ASSISTANT

## 2022-03-27 PROCEDURE — 93005 ELECTROCARDIOGRAM TRACING: CPT | Performed by: PHYSICIAN ASSISTANT

## 2022-03-27 PROCEDURE — 36415 COLL VENOUS BLD VENIPUNCTURE: CPT

## 2022-03-27 PROCEDURE — 83880 ASSAY OF NATRIURETIC PEPTIDE: CPT

## 2022-03-27 PROCEDURE — 80053 COMPREHEN METABOLIC PANEL: CPT

## 2022-03-27 PROCEDURE — 96374 THER/PROPH/DIAG INJ IV PUSH: CPT

## 2022-03-27 PROCEDURE — 84484 ASSAY OF TROPONIN QUANT: CPT

## 2022-03-27 PROCEDURE — 6370000000 HC RX 637 (ALT 250 FOR IP)

## 2022-03-27 PROCEDURE — 6360000002 HC RX W HCPCS: Performed by: PHYSICIAN ASSISTANT

## 2022-03-27 PROCEDURE — 85025 COMPLETE CBC W/AUTO DIFF WBC: CPT

## 2022-03-27 RX ORDER — BENZONATATE 100 MG/1
100 CAPSULE ORAL 3 TIMES DAILY PRN
Qty: 30 CAPSULE | Refills: 1 | Status: SHIPPED | OUTPATIENT
Start: 2022-03-27 | End: 2022-04-03

## 2022-03-27 RX ORDER — IPRATROPIUM BROMIDE AND ALBUTEROL SULFATE 2.5; .5 MG/3ML; MG/3ML
1 SOLUTION RESPIRATORY (INHALATION) ONCE
Status: COMPLETED | OUTPATIENT
Start: 2022-03-27 | End: 2022-03-27

## 2022-03-27 RX ORDER — IPRATROPIUM BROMIDE AND ALBUTEROL SULFATE 2.5; .5 MG/3ML; MG/3ML
1 SOLUTION RESPIRATORY (INHALATION)
Status: DISCONTINUED | OUTPATIENT
Start: 2022-03-27 | End: 2022-03-27

## 2022-03-27 RX ORDER — IPRATROPIUM BROMIDE AND ALBUTEROL SULFATE 2.5; .5 MG/3ML; MG/3ML
1 SOLUTION RESPIRATORY (INHALATION)
Status: DISCONTINUED | OUTPATIENT
Start: 2022-03-27 | End: 2022-03-27 | Stop reason: HOSPADM

## 2022-03-27 RX ORDER — IPRATROPIUM BROMIDE AND ALBUTEROL SULFATE 2.5; .5 MG/3ML; MG/3ML
SOLUTION RESPIRATORY (INHALATION)
Status: COMPLETED
Start: 2022-03-27 | End: 2022-03-27

## 2022-03-27 RX ORDER — METHYLPREDNISOLONE SODIUM SUCCINATE 125 MG/2ML
125 INJECTION, POWDER, LYOPHILIZED, FOR SOLUTION INTRAMUSCULAR; INTRAVENOUS ONCE
Status: COMPLETED | OUTPATIENT
Start: 2022-03-27 | End: 2022-03-27

## 2022-03-27 RX ORDER — HYDROCHLOROTHIAZIDE 25 MG/1
TABLET ORAL
COMMUNITY

## 2022-03-27 RX ORDER — PREDNISONE 10 MG/1
TABLET ORAL
Qty: 20 TABLET | Refills: 0 | Status: SHIPPED | OUTPATIENT
Start: 2022-03-27 | End: 2022-04-06

## 2022-03-27 RX ADMIN — METHYLPREDNISOLONE SODIUM SUCCINATE 125 MG: 125 INJECTION, POWDER, FOR SOLUTION INTRAMUSCULAR; INTRAVENOUS at 17:08

## 2022-03-27 RX ADMIN — IPRATROPIUM BROMIDE AND ALBUTEROL SULFATE 3 ML: 2.5; .5 SOLUTION RESPIRATORY (INHALATION) at 17:17

## 2022-03-27 RX ADMIN — IPRATROPIUM BROMIDE AND ALBUTEROL SULFATE 1 AMPULE: .5; 3 SOLUTION RESPIRATORY (INHALATION) at 15:35

## 2022-03-27 RX ADMIN — IPRATROPIUM BROMIDE AND ALBUTEROL SULFATE 3 ML: .5; 3 SOLUTION RESPIRATORY (INHALATION) at 17:17

## 2022-03-27 NOTE — ED PROVIDER NOTES
[id number] [facility]  [address] [facility] [address]  Phone: [phone]  Fax: [address]      Pt Name: Maxime Frankel. MRN: [id number]  Birthdate 1950  Date of evaluation: 3/27/22      CHIEF COMPLAINT:  Chief Complaint   Patient presents with    Shortness of Breath       HISTORY OF PRESENT ILLNESS    Maxime Frankel. is a 67 y.o. male who presents with respiratory complaint:     Location/Symptom:      Cough? YES    Productive? Yes  Fever? No  SOB? Yes  Wheezing? No  Pleuritic pain? No  Chestpain associated? No  Hx asthma or COPD? Yes  Smoker? Yes  Trauma? NO    Timing/Onset: 7 days  Context/Setting: Patient here for evaluation of increasing shortness of breath over the last 7 days. Patient states it has been getting worse the more so the last few days. Patient has a history of COPD and has Symbicort and albuterol that he uses at home though this does not seem to be helping much. Patient has been having preceding upper respiratory symptoms as well as some cough. He denies any chest, pleuritic, back or abdominal pains. Patient states he felt little bit of pressure in his chest a little earlier today. Patient does have history of bilateral lower extremity pitting edema though has been much better since he has been back to work, he does have diuretics prescribed but he has not been taking them. Patient states that since has been back that is done a good job of keeping the swelling at baseline. He denies any new calf or lower extremity pain. Patient has had Covid vaccinations but has not been booster to. He has not been around anyone else with similar symptoms or any other concerning flu/Covid-like symptoms. He denies any headache or myalgias. Quality:  As above  Duration:  intermittent  Modifying Factors:   exertion  Severity: mild-moderate    Nursing Notes were reviewed.   REVIEW OF SYSTEMS Constitutional:  Per HPI  Eyes: No visual changes. Neck: No neck pain. Respiratory:  Per HPI  Cardiac:  Per HPI   GI:  Denies abdominal pain/nausea/vomiting/diarrhea. : Denies dysuria. Musculoskeletal: Denies focal weakness. Neurologic: denies headache or focal weakness. Skin:  Denies any rash. Negative in 10 essential Systems except as mentioned above and in the HPI. PAST MEDICAL HISTORY   PMH:  has a past medical history of Arthritis, COPD (chronic obstructive pulmonary disease) ([facility] [address] Diabetes mellitus ([facility] and GURPREET on CPAP. Surgical History:  has a past surgical history that includes Appendectomy (1952); Tonsillectomy (1959); Cholecystectomy, laparoscopic (1996); Total knee arthroplasty (Left, 02/01/2017); joint replacement (Right, 11/07/2017); lumbar laminectomy (10/24/2018); and Spinal Cord Stimulator Surgery (Right, 05/2021). Social History:  reports that he has been smoking cigarettes. He has a 42.00 pack-year smoking history. He has never used smokeless tobacco. He reports that he does not drink alcohol and does not use drugs. Family History: Noncontributory   Psychiatric History: Noncontributory     Allergies:is allergic to cheratussin ac [guaifenesin-codeine]. PHYSICAL EXAM     INITIAL VITALS: BP (!) 185/82   Pulse 87   Temp 98.1 °F (36.7 °C) (Oral)   Resp 17   Ht 5' 7\" (1.702 m)   Wt 124.7 kg (275 lb)   SpO2 93%   BMI 43.07 kg/m²   Constitutional:  Well developed   Eyes:  Pupils equal/round  HENT:  Ears/nose wnl. Nasal congestion appreciated. Respiratory:   Patient with some mild dyspnea during my exam but in no respiratory distress. Some very mild expiratory wheezing in all fields. No rhonchi or significant rales appreciated. Cardiovascular:  RRR with normal S1 and S2  Gastrointestinal/Abdomen:  Soft, NT.  BS present. Musculoskeletal:  Normal to inspection. 2-3+ pitting edema bilateral lower extremities without any new acute appearing erythema.   No warmth or calf tenderness palpation bilat. Back:  No CVA tenderness. Normal to inspection. Integument:  No rash. Neurologic:  Alert, age appropriate interaction/mentation, no focal deficits noted       DIAGNOSTIC RESULTS     EKG: All EKG's are interpreted by the Emergency Department Physician who either signs or Co-signs this chart in the absence of a cardiologist.  Not indicated, or per attending note    RADIOLOGY:   Reviewed the radiologist:  XR CHEST PORTABLE   Final Result   No acute cardiopulmonary disease             LABS:  Labs Reviewed   CBC WITH AUTO DIFFERENTIAL - Abnormal; Notable for the following components:       Result Value    Monocytes 12 (*)     All other components within normal limits   COMPREHENSIVE METABOLIC PANEL - Abnormal; Notable for the following components:    Glucose 119 (*)     Total Bilirubin 0.26 (*)     All other components within normal limits   COVID-19, RAPID   TROPONIN   BRAIN NATRIURETIC PEPTIDE         EMERGENCY DEPARTMENT COURSE/MDM/DDX:       4182  Patient here with increasing shortness of breath, he is treated for COPD and is still presently a smoker. Less than hypertension all vital signs are stable, does have some expiratory wheeze on auscultation. No associated pleuritic or chest pains. No new swelling of the lower extremities. 1810  Pt looking a bit more comfortable after both Duonebs but reporting he hasn't felt much improvement with them. He has concerns about the level of WOODS he is having. I am checking an exertional sat level and family was concerned about COVID r/o so I have ordered a rapid COVID as well. 1819  92-94% on RA while walking in room and duque. 1914  I have reviewed the disposition diagnosis with the patient and or their family/guardian. I have answered their questions and given discharge instructions. They voiced understanding of these instructions and did not have any further questions or complaints.       Orders Placed This Encounter   Medications    DISCONTD: ipratropium-albuterol (DUONEB) nebulizer solution 1 ampule     Order Specific Question:   Initiate RT Bronchodilator Protocol     Answer: Yes    ipratropium-albuterol (DUONEB) nebulizer solution 1 ampule     Order Specific Question:   Initiate RT Bronchodilator Protocol     Answer: Yes    methylPREDNISolone sodium (SOLU-MEDROL) injection 125 mg    ipratropium-albuterol (DUONEB) nebulizer solution 1 ampule     Order Specific Question:   Initiate RT Bronchodilator Protocol     Answer: Yes    ipratropium-albuterol (DUONEB) 0.5-2.5 (3) MG/3ML nebulizer solution     MORENO REED: cabinet override    predniSONE (DELTASONE) 10 MG tablet     Sig: Take 4 tablets by mouth once daily for 5 days     Dispense:  20 tablet     Refill:  0    benzonatate (TESSALON PERLES) 100 MG capsule     Sig: Take 1 capsule by mouth 3 times daily as needed for Cough     Dispense:  30 capsule     Refill:  1       CONSULTS:  None      FINAL IMPRESSION      1. [facility] exacerbation ([facility]          DISPOSITION/PLAN:  DISPOSITION          PATIENT REFERRED TO:  Jin Jacome MD  [address]   [facility]  [facility] [address]  [phone]    Schedule an appointment as soon as possible for a visit in 2 days  for re-evaluation of your symptoms      DISCHARGE MEDICATIONS:  New Prescriptions    BENZONATATE (TESSALON PERLES) 100 MG CAPSULE    Take 1 capsule by mouth 3 times daily as needed for Cough    PREDNISONE (DELTASONE) 10 MG TABLET    Take 4 tablets by mouth once daily for 5 days       (Please note that portions of this note were completed with a voice recognition program.  Efforts were made to edit the dictations but occasionally words are mis-transcribed.)    BAO Yap PA-C  03/27/22 5735

## 2022-03-28 LAB
EKG ATRIAL RATE: 86 BPM
EKG P AXIS: 29 DEGREES
EKG P-R INTERVAL: 146 MS
EKG Q-T INTERVAL: 348 MS
EKG QRS DURATION: 86 MS
EKG QTC CALCULATION (BAZETT): 416 MS
EKG R AXIS: -12 DEGREES
EKG T AXIS: 29 DEGREES
EKG VENTRICULAR RATE: 86 BPM

## 2022-03-30 NOTE — ED PROVIDER NOTES
[id number] [facility] ED  [address] [facility] [address]  Phone: [phone]  Fax: [phone]      Attending Physician Attestation    I performed a history and physical examination of the patient and discussed management with the mid level provider. I reviewed the mid level provider's note and agree with the documented findings and plan of care. Any areas of disagreement are noted on the chart. I was personally present for the key portions of any procedures. I have documented in the chart those procedures where I was not present during the key portions. I have reviewed the emergency nurses triage note. I agree with the chief complaint, past medical history, past surgical history, allergies, medications, social and family history as documented unless otherwise noted below. Documentation of the HPI, Physical Exam and Medical Decision Making performed by mid level providers is based on my personal performance of the HPI, PE and MDM. For Physician Assistant/ Nurse Practitioner cases/documentation I have personally evaluated this patient and have completed at least one if not all key elements of the E/M (history, physical exam, and MDM). Additional findings are as noted. CHIEF COMPLAINT       Chief Complaint   Patient presents with    Shortness of Breath         HISTORY OF PRESENT ILLNESS    Teresa Ward is a 67 y.o. male who presents shortness of breath with concern for COPD. He has no chest pain. No lower extremity swelling or edema edema. No history of DVT or PE. PAST MEDICAL HISTORY    has a past medical history of Arthritis, COPD (chronic obstructive pulmonary disease) ([address] Diabetes mellitus ([address] and GURPREET on CPAP. SURGICAL HISTORY      has a past surgical history that includes Appendectomy (1952); Tonsillectomy (1959); Cholecystectomy, laparoscopic (1996);  Total knee arthroplasty (Left, 02/01/2017); joint replacement (Right, 11/07/2017); lumbar laminectomy (10/24/2018); and Spinal Cord Stimulator Surgery (Right, 05/2021). CURRENT MEDICATIONS       Discharge Medication List as of 3/27/2022  7:14 PM      CONTINUE these medications which have NOT CHANGED    Details   !! hydroCHLOROthiazide (HYDRODIURIL) 25 MG tablet 1 tabletHistorical Med      oxyCODONE-acetaminophen (PERCOCET)  MG per tablet Take 1 tablet by mouth every 8 hours as needed for Pain for up to 30 days. , Disp-90 tablet, R-0Normal      gabapentin (NEURONTIN) 300 MG capsule Take 1 capsule by mouth 4 times daily for 30 days. , Disp-360 capsule, R-1Normal      Multiple Vitamins-Minerals (CENTRUM SILVER PO) Take by mouthHistorical Med      budesonide-formoterol (SYMBICORT) 160-4.5 MCG/ACT AERO Inhale 2 puffs into the lungs 2 times daily, Disp-10.2 g, R-5Normal      furosemide (LASIX) 20 MG tablet Take 1 tablet by mouth 2 times daily, Disp-180 tablet, R-3Normal      metFORMIN (GLUCOPHAGE) 1000 MG tablet Take 1 tablet by mouth daily (with breakfast), Disp-90 tablet, R-3Normal      atorvastatin (LIPITOR) 20 MG tablet Take 1 tablet by mouth nightly, Disp-90 tablet, R-3Normal      !! hydroCHLOROthiazide (HYDRODIURIL) 50 MG tablet Take 1 tablet by mouth daily, Disp-90 tablet, R-3Normal      benazepril (LOTENSIN) 20 MG tablet Take 1 tablet by mouth daily, Disp-90 tablet, R-2Normal      vitamin D 25 MCG (1000 UT) CAPS Take 2 capsules by mouth daily, Disp-60 capsule, R-5Normal      celecoxib (CELEBREX) 200 MG capsule TAKE 1 CAPSULE BY MOUTH EVERY DAYHistorical Med      albuterol sulfate HFA (PROAIR HFA) 108 (90 Base) MCG/ACT inhaler Inhale 2 puffs into the lungs every 6 hours as needed for Wheezing, Disp-8.5 g, R-5Normal      ipratropium-albuterol (DUONEB) 0.5-2.5 (3) MG/3ML SOLN nebulizer solution Inhale 3 mLs into the lungs every 6 hours as needed for Shortness of Breath, Disp-360 mL, R-1Normal       !! - Potential duplicate medications found. Please discuss with provider.           ALLERGIES     is allergic to cheratussin ac [guaifenesin-codeine]. FAMILY HISTORY     has no family status information on file. family history is not on file. SOCIAL HISTORY      reports that he has been smoking cigarettes. He has a 42.00 pack-year smoking history. He has never used smokeless tobacco. He reports that he does not drink alcohol and does not use drugs. PHYSICAL EXAM     INITIAL VITALS:  height is 5' 7\" (1.702 m) and weight is 275 lb (124.7 kg). His oral temperature is 98.1 °F (36.7 °C). His blood pressure is 108/94 (abnormal) and his pulse is 85. His respiration is 17 and oxygen saturation is 93%. Patient is resting comfortably no acute distress does have some scattered wheezes      DIAGNOSTIC RESULTS     EKG: All EKG's are interpreted by the Emergency Department Physician who either signs or Co-signs this chart in the absence of a cardiologist.    EKG Interpretation    Interpreted by me    Rhythm: normal sinus   Rate: normal  Axis: normal  Ectopy: none  Conduction: normal  ST Segments: no acute change  T Waves: no acute change  Q Waves: none    Clinical Impression: no acute changes and normal EKG    RADIOLOGY:   Non-plain film images such as CT, Ultrasound and MRI are read by the radiologist. Plain radiographic images are visualized and the radiologist interpretations are reviewed as follows:         LABS:  Results for orders placed or performed during the hospital encounter of 03/27/22   COVID-19, Rapid    Specimen: Nasopharyngeal Swab   Result Value Ref Range    Specimen Description . NASOPHARYNGEAL SWAB     SARS-CoV-2, Rapid Not Detected Not Detected   CBC with Auto Differential   Result Value Ref Range    WBC 9.0 3.5 - 11.0 k/uL    RBC 5.09 4.5 - 5.9 m/uL    Hemoglobin 15.7 13.5 - 17.5 g/dL    Hematocrit 47.5 41 - 53 %    MCV 93.3 80 - 100 fL    MCH 30.9 26 - 34 pg    MCHC 33.1 31 - 37 g/dL    RDW 14.1 12.5 - 15.4 %    Platelets 362 762 - 153 k/uL    MPV 6.9 6.0 - 12.0 fL    Seg Neutrophils 57 36 - 66 % Lymphocytes 27 24 - 44 %    Monocytes 12 (H) 2 - 11 %    Eosinophils % 3 1 - 4 %    Basophils 1 0 - 2 %    Segs Absolute 5.30 1.8 - 7.7 k/uL    Absolute Lymph # 2.40 1.0 - 4.8 k/uL    Absolute Mono # 1.00 0.1 - 1.2 k/uL    Absolute Eos # 0.20 0.0 - 0.4 k/uL    Basophils Absolute 0.00 0.0 - 0.2 k/uL   Comprehensive Metabolic Panel   Result Value Ref Range    Glucose 119 (H) 70 - 99 mg/dL    BUN 11 8 - 23 mg/dL    CREATININE 0.77 0.70 - 1.20 mg/dL    Calcium 8.9 8.6 - 10.4 mg/dL    Sodium 135 135 - 144 mmol/L    Potassium 4.0 3.7 - 5.3 mmol/L    Chloride 99 98 - 107 mmol/L    CO2 25 20 - 31 mmol/L    Anion Gap 11 9 - 17 mmol/L    Alkaline Phosphatase 77 40 - 129 U/L    ALT 28 5 - 41 U/L    AST 24 <40 U/L    Total Bilirubin 0.26 (L) 0.3 - 1.2 mg/dL    Total Protein 7.2 6.4 - 8.3 g/dL    Albumin 3.8 3.5 - 5.2 g/dL    Albumin/Globulin Ratio 1.1 1.0 - 2.5    GFR Non-African American >60 >60 mL/min    GFR African American >60 >60 mL/min    GFR Comment         Troponin   Result Value Ref Range    Troponin, High Sensitivity 18 0 - 22 ng/L   Brain Natriuretic Peptide   Result Value Ref Range    Pro-BNP 65 <300 pg/mL   EKG 12 Lead   Result Value Ref Range    Ventricular Rate 86 BPM    Atrial Rate 86 BPM    P-R Interval 146 ms    QRS Duration 86 ms    Q-T Interval 348 ms    QTc Calculation (Bazett) 416 ms    P Axis 29 degrees    R Axis -12 degrees    T Axis 29 degrees           EMERGENCY DEPARTMENT COURSE:   Vitals:    Vitals:    03/27/22 1453 03/27/22 1536 03/27/22 1717 03/27/22 1915   BP: (!) 185/82   (!) 108/94   Pulse: 87   85   Resp: 24 21 17 17   Temp: 98.1 °F (36.7 °C)      TempSrc: Oral      SpO2: 91% 93% 93% 93%   Weight: 275 lb (124.7 kg)      Height: 5' 7\" (1.702 m)        -------------------------  BP: (!) 108/94, Temp: 98.1 °F (36.7 °C), Pulse: 85, Resp: 17      PERTINENT ATTENDING PHYSICIAN COMMENTS:    Patient with history of COPD. Received breathing treatments here was adamant about discharge.   Is able walk and ambulate with sats maintained in 92-94. Low clinical suspicion for DVT or PE. Advised on quitting smoking. (Please note that portions of this note were completed with a voice recognition program.  Efforts were made to edit the dictations but occasionally words are mis-transcribed. )    Tika Fernandez MD,, MD, F.A.C.E.P.   Attending Emergency Medicine Physician      Callie Camejo MD  03/30/22 4159

## 2022-04-19 DIAGNOSIS — M54.50 ACUTE MIDLINE LOW BACK PAIN WITHOUT SCIATICA: ICD-10-CM

## 2022-04-19 RX ORDER — CELECOXIB 200 MG/1
CAPSULE ORAL
Qty: 30 CAPSULE | Refills: 5 | Status: SHIPPED | OUTPATIENT
Start: 2022-04-19 | End: 2022-09-20 | Stop reason: SDUPTHER

## 2022-04-19 RX ORDER — OXYCODONE AND ACETAMINOPHEN 10; 325 MG/1; MG/1
1 TABLET ORAL EVERY 8 HOURS PRN
Qty: 90 TABLET | Refills: 0 | Status: SHIPPED | OUTPATIENT
Start: 2022-04-19 | End: 2022-05-20 | Stop reason: SDUPTHER

## 2022-04-24 ENCOUNTER — HOSPITAL ENCOUNTER (EMERGENCY)
Age: 72
Discharge: HOME | End: 2022-04-24
Attending: EMERGENCY MEDICINE
Payer: COMMERCIAL

## 2022-04-24 VITALS
SYSTOLIC BLOOD PRESSURE: 149 MMHG | HEIGHT: 67 IN | RESPIRATION RATE: 16 BRPM | TEMPERATURE: 97.7 F | WEIGHT: 265 LBS | HEART RATE: 88 BPM | DIASTOLIC BLOOD PRESSURE: 93 MMHG | BODY MASS INDEX: 41.59 KG/M2 | OXYGEN SATURATION: 95 %

## 2022-04-24 DIAGNOSIS — S39.012A STRAIN OF LUMBAR REGION, INITIAL ENCOUNTER: Primary | ICD-10-CM

## 2022-04-24 PROCEDURE — 96372 THER/PROPH/DIAG INJ SC/IM: CPT

## 2022-04-24 PROCEDURE — 6360000002 HC RX W HCPCS: Performed by: PHYSICIAN ASSISTANT

## 2022-04-24 PROCEDURE — 99284 EMERGENCY DEPT VISIT MOD MDM: CPT

## 2022-04-24 RX ORDER — ORPHENADRINE CITRATE 30 MG/ML
60 INJECTION INTRAMUSCULAR; INTRAVENOUS ONCE
Status: COMPLETED | OUTPATIENT
Start: 2022-04-24 | End: 2022-04-24

## 2022-04-24 RX ORDER — CYCLOBENZAPRINE HCL 10 MG
10 TABLET ORAL NIGHTLY PRN
Qty: 10 TABLET | Refills: 0 | Status: SHIPPED | OUTPATIENT
Start: 2022-04-24 | End: 2022-05-04

## 2022-04-24 RX ORDER — KETOROLAC TROMETHAMINE 30 MG/ML
30 INJECTION, SOLUTION INTRAMUSCULAR; INTRAVENOUS ONCE
Status: COMPLETED | OUTPATIENT
Start: 2022-04-24 | End: 2022-04-24

## 2022-04-24 RX ADMIN — ORPHENADRINE CITRATE 60 MG: 30 INJECTION INTRAMUSCULAR; INTRAVENOUS at 13:21

## 2022-04-24 RX ADMIN — KETOROLAC TROMETHAMINE 30 MG: 30 INJECTION, SOLUTION INTRAMUSCULAR at 13:21

## 2022-04-24 ASSESSMENT — PAIN DESCRIPTION - ORIENTATION: ORIENTATION: LOWER

## 2022-04-24 ASSESSMENT — PAIN SCALES - GENERAL: PAINLEVEL_OUTOF10: 7

## 2022-04-24 ASSESSMENT — PAIN DESCRIPTION - LOCATION: LOCATION: BACK

## 2022-04-24 NOTE — ED NOTES
Patient provided with discharge instructions, prescriptions, and follow up information. Verbalized understanding. No IV access to discontinue. A&OX3. Wheelchair provided for discharge.      Marito Blancas RN  04/24/22 6255

## 2022-04-24 NOTE — ED PROVIDER NOTES
[address] [facility] ED    [address] [place] [address]  Phone: [phone]  Fax: [phone]  Emergency Department  Faculty Attestation    I performed a history and physical examination of the patient and discussed management with the mid level provideer. I reviewed the mid level provider's note and agree with the documented findings and plan of care. Any areas of disagreement are noted on the chart. I was personally present for the key portions of any procedures. I have documented in the chart those procedures where I was not present during the key portions. I have reviewed the emergency nurses triage note. I agree with the chief complaint, past medical history, past surgical history, allergies, medications, social and family history as documented unless otherwise noted below. Documentation of the HPI, Physical Exam and Medical Decision Making performed by medical students or scribes is based on my personal performance of the HPI, PE and MDM. For Physician Assistant/ Nurse Practitioner cases/documentation I have personally evaluated this patient and have completed at least one if not all key elements of the E/M (history, physical exam, and MDM). Additional findings are as noted. Primary Care Physician:  Yuriy Valdez MD    [address] [facility]       Chief Complaint   Patient presents with    Back Pain     lower back unknown LUPILLO       RECENT VITALS:   Temp: 97.7 °F (36.5 °C),  Pulse: 88, Resp: 16, BP: (!) 149/93    LABS:  Labs Reviewed - No data to display      PERTINENT ATTENDING PHYSICIAN COMMENTS:    The patient presents with back pain. He has a history of back pain in the past, including prior lumbar surgeries. He sees a pain specialist.  Patient states he awoke yesterday with pain. He does not recall any recent trauma however. He has had some Percocet with improvement. The patient denies any focal weakness or numbness. He denies bowel or bladder issues.   On exam, the patient has pain across the lumbar area but no skin changes. He is able to ambulate and has plus 5 out of 5 gross motor strength. He has no saddle paresthesias. We will write for muscle relaxer and anti-inflammatories. The patient is discharged in good condition.          Philippa Holstein, MD  04/24/22 1589

## 2022-05-10 ENCOUNTER — OFFICE VISIT (OUTPATIENT)
Dept: PRIMARY CARE CLINIC | Age: 72
End: 2022-05-10
Payer: MEDICARE

## 2022-05-10 VITALS
OXYGEN SATURATION: 92 % | DIASTOLIC BLOOD PRESSURE: 70 MMHG | BODY MASS INDEX: 43.03 KG/M2 | SYSTOLIC BLOOD PRESSURE: 134 MMHG | HEIGHT: 67 IN | WEIGHT: 274.2 LBS | HEART RATE: 84 BPM

## 2022-05-10 DIAGNOSIS — M96.1 POST LAMINECTOMY SYNDROME: ICD-10-CM

## 2022-05-10 DIAGNOSIS — M43.16 SPONDYLOLISTHESIS AT L4-L5 LEVEL: ICD-10-CM

## 2022-05-10 DIAGNOSIS — Z12.5 SCREENING PSA (PROSTATE SPECIFIC ANTIGEN): ICD-10-CM

## 2022-05-10 DIAGNOSIS — Z00.00 ANNUAL PHYSICAL EXAM: ICD-10-CM

## 2022-05-10 DIAGNOSIS — E08.65 DIABETES MELLITUS DUE TO UNDERLYING CONDITION WITH HYPERGLYCEMIA, WITHOUT LONG-TERM CURRENT USE OF INSULIN (HCC): Primary | ICD-10-CM

## 2022-05-10 DIAGNOSIS — E66.01 OBESITY, MORBID, BMI 40.0-49.9 (HCC): ICD-10-CM

## 2022-05-10 DIAGNOSIS — E11.9 CONTROLLED TYPE 2 DIABETES MELLITUS WITHOUT COMPLICATION, WITHOUT LONG-TERM CURRENT USE OF INSULIN (HCC): ICD-10-CM

## 2022-05-10 DIAGNOSIS — Z13.220 ENCOUNTER FOR LIPID SCREENING FOR CARDIOVASCULAR DISEASE: ICD-10-CM

## 2022-05-10 DIAGNOSIS — Z13.6 ENCOUNTER FOR LIPID SCREENING FOR CARDIOVASCULAR DISEASE: ICD-10-CM

## 2022-05-10 LAB — HBA1C MFR BLD: 6.4 %

## 2022-05-10 PROCEDURE — 3017F COLORECTAL CA SCREEN DOC REV: CPT | Performed by: FAMILY MEDICINE

## 2022-05-10 PROCEDURE — 83036 HEMOGLOBIN GLYCOSYLATED A1C: CPT | Performed by: FAMILY MEDICINE

## 2022-05-10 PROCEDURE — 1123F ACP DISCUSS/DSCN MKR DOCD: CPT | Performed by: FAMILY MEDICINE

## 2022-05-10 PROCEDURE — G8417 CALC BMI ABV UP PARAM F/U: HCPCS | Performed by: FAMILY MEDICINE

## 2022-05-10 PROCEDURE — 4004F PT TOBACCO SCREEN RCVD TLK: CPT | Performed by: FAMILY MEDICINE

## 2022-05-10 PROCEDURE — 4040F PNEUMOC VAC/ADMIN/RCVD: CPT | Performed by: FAMILY MEDICINE

## 2022-05-10 PROCEDURE — 99214 OFFICE O/P EST MOD 30 MIN: CPT | Performed by: FAMILY MEDICINE

## 2022-05-10 PROCEDURE — G8427 DOCREV CUR MEDS BY ELIG CLIN: HCPCS | Performed by: FAMILY MEDICINE

## 2022-05-10 PROCEDURE — 2022F DILAT RTA XM EVC RTNOPTHY: CPT | Performed by: FAMILY MEDICINE

## 2022-05-10 PROCEDURE — 3044F HG A1C LEVEL LT 7.0%: CPT | Performed by: FAMILY MEDICINE

## 2022-05-10 RX ORDER — MUPIROCIN CALCIUM 20 MG/G
CREAM TOPICAL
Qty: 30 G | Refills: 2 | Status: SHIPPED | OUTPATIENT
Start: 2022-05-10 | End: 2022-06-09

## 2022-05-10 SDOH — ECONOMIC STABILITY: FOOD INSECURITY: WITHIN THE PAST 12 MONTHS, THE FOOD YOU BOUGHT JUST DIDN'T LAST AND YOU DIDN'T HAVE MONEY TO GET MORE.: NEVER TRUE

## 2022-05-10 SDOH — ECONOMIC STABILITY: FOOD INSECURITY: WITHIN THE PAST 12 MONTHS, YOU WORRIED THAT YOUR FOOD WOULD RUN OUT BEFORE YOU GOT MONEY TO BUY MORE.: NEVER TRUE

## 2022-05-10 ASSESSMENT — ENCOUNTER SYMPTOMS
COUGH: 0
DIARRHEA: 0
ABDOMINAL PAIN: 0
RHINORRHEA: 0
EYE DISCHARGE: 0
NAUSEA: 0
EYE REDNESS: 0
WHEEZING: 0
SHORTNESS OF BREATH: 0
VOMITING: 0
SORE THROAT: 0

## 2022-05-10 ASSESSMENT — SOCIAL DETERMINANTS OF HEALTH (SDOH): HOW HARD IS IT FOR YOU TO PAY FOR THE VERY BASICS LIKE FOOD, HOUSING, MEDICAL CARE, AND HEATING?: NOT HARD AT ALL

## 2022-05-11 DIAGNOSIS — Z12.5 SCREENING PSA (PROSTATE SPECIFIC ANTIGEN): ICD-10-CM

## 2022-05-11 DIAGNOSIS — Z13.220 ENCOUNTER FOR LIPID SCREENING FOR CARDIOVASCULAR DISEASE: ICD-10-CM

## 2022-05-11 DIAGNOSIS — E08.65 DIABETES MELLITUS DUE TO UNDERLYING CONDITION WITH HYPERGLYCEMIA, WITHOUT LONG-TERM CURRENT USE OF INSULIN (HCC): ICD-10-CM

## 2022-05-11 DIAGNOSIS — Z00.00 ANNUAL PHYSICAL EXAM: ICD-10-CM

## 2022-05-11 DIAGNOSIS — E66.01 OBESITY, MORBID, BMI 40.0-49.9 (HCC): ICD-10-CM

## 2022-05-11 DIAGNOSIS — Z13.6 ENCOUNTER FOR LIPID SCREENING FOR CARDIOVASCULAR DISEASE: ICD-10-CM

## 2022-05-11 LAB
ALBUMIN SERPL-MCNC: 4 G/DL (ref 3.5–5.2)
ALBUMIN/GLOBULIN RATIO: 1.2 (ref 1–2.5)
ALP BLD-CCNC: 74 U/L (ref 40–129)
ALT SERPL-CCNC: 25 U/L (ref 5–41)
ANION GAP SERPL CALCULATED.3IONS-SCNC: 19 MMOL/L (ref 9–17)
AST SERPL-CCNC: 26 U/L
BILIRUB SERPL-MCNC: 0.39 MG/DL (ref 0.3–1.2)
BILIRUBIN DIRECT: 0.08 MG/DL
BILIRUBIN, INDIRECT: 0.31 MG/DL (ref 0–1)
BUN BLDV-MCNC: 17 MG/DL (ref 8–23)
BUN/CREAT BLD: ABNORMAL (ref 9–20)
CALCIUM SERPL-MCNC: 9.8 MG/DL (ref 8.6–10.4)
CHLORIDE BLD-SCNC: 102 MMOL/L (ref 98–107)
CHOLESTEROL, FASTING: 136 MG/DL
CHOLESTEROL/HDL RATIO: 4.3
CO2: 20 MMOL/L (ref 20–31)
CREAT SERPL-MCNC: 0.79 MG/DL (ref 0.7–1.2)
GFR AFRICAN AMERICAN: >60 ML/MIN
GFR NON-AFRICAN AMERICAN: >60 ML/MIN
GFR SERPL CREATININE-BSD FRML MDRD: ABNORMAL ML/MIN/{1.73_M2}
GFR SERPL CREATININE-BSD FRML MDRD: ABNORMAL ML/MIN/{1.73_M2}
GLOBULIN: NORMAL G/DL (ref 1.5–3.8)
GLUCOSE FASTING: 106 MG/DL (ref 70–99)
HDLC SERPL-MCNC: 32 MG/DL
LDL CHOLESTEROL: 70 MG/DL (ref 0–130)
POTASSIUM SERPL-SCNC: 4.4 MMOL/L (ref 3.7–5.3)
PROSTATE SPECIFIC ANTIGEN: 0.93 NG/ML
SODIUM BLD-SCNC: 141 MMOL/L (ref 135–144)
TOTAL PROTEIN: 7.3 G/DL (ref 6.4–8.3)
TRIGLYCERIDE, FASTING: 171 MG/DL
TSH SERPL DL<=0.05 MIU/L-ACNC: 3 UIU/ML (ref 0.3–5)
VLDLC SERPL CALC-MCNC: ABNORMAL MG/DL (ref 1–30)

## 2022-05-19 DIAGNOSIS — M54.50 ACUTE MIDLINE LOW BACK PAIN WITHOUT SCIATICA: ICD-10-CM

## 2022-05-20 RX ORDER — OXYCODONE AND ACETAMINOPHEN 10; 325 MG/1; MG/1
1 TABLET ORAL EVERY 8 HOURS PRN
Qty: 90 TABLET | Refills: 0 | Status: SHIPPED | OUTPATIENT
Start: 2022-05-20 | End: 2022-06-20 | Stop reason: SDUPTHER

## 2022-06-20 DIAGNOSIS — M54.50 ACUTE MIDLINE LOW BACK PAIN WITHOUT SCIATICA: ICD-10-CM

## 2022-06-20 RX ORDER — OXYCODONE AND ACETAMINOPHEN 10; 325 MG/1; MG/1
1 TABLET ORAL EVERY 8 HOURS PRN
Qty: 90 TABLET | Refills: 0 | Status: SHIPPED | OUTPATIENT
Start: 2022-06-20 | End: 2022-07-22 | Stop reason: SDUPTHER

## 2022-06-29 ENCOUNTER — HOSPITAL ENCOUNTER (EMERGENCY)
Age: 72
Discharge: HOME | End: 2022-06-29
Attending: EMERGENCY MEDICINE
Payer: MEDICARE

## 2022-06-29 VITALS
TEMPERATURE: 98.1 F | RESPIRATION RATE: 16 BRPM | HEART RATE: 86 BPM | SYSTOLIC BLOOD PRESSURE: 152 MMHG | DIASTOLIC BLOOD PRESSURE: 80 MMHG | HEIGHT: 67 IN | BODY MASS INDEX: 42.95 KG/M2 | OXYGEN SATURATION: 94 %

## 2022-06-29 DIAGNOSIS — S39.012A BACK STRAIN, INITIAL ENCOUNTER: Primary | ICD-10-CM

## 2022-06-29 PROCEDURE — 96372 THER/PROPH/DIAG INJ SC/IM: CPT

## 2022-06-29 PROCEDURE — 6360000002 HC RX W HCPCS: Performed by: EMERGENCY MEDICINE

## 2022-06-29 PROCEDURE — 99284 EMERGENCY DEPT VISIT MOD MDM: CPT

## 2022-06-29 RX ORDER — KETOROLAC TROMETHAMINE 30 MG/ML
30 INJECTION, SOLUTION INTRAMUSCULAR; INTRAVENOUS ONCE
Status: COMPLETED | OUTPATIENT
Start: 2022-06-29 | End: 2022-06-29

## 2022-06-29 RX ORDER — CYCLOBENZAPRINE HCL 10 MG
10 TABLET ORAL 3 TIMES DAILY PRN
Qty: 30 TABLET | Refills: 0 | Status: SHIPPED | OUTPATIENT
Start: 2022-06-29 | End: 2022-07-09

## 2022-06-29 RX ORDER — DEXAMETHASONE 4 MG/1
4 TABLET ORAL ONCE
Status: COMPLETED | OUTPATIENT
Start: 2022-06-29 | End: 2022-06-29

## 2022-06-29 RX ORDER — ORPHENADRINE CITRATE 30 MG/ML
60 INJECTION INTRAMUSCULAR; INTRAVENOUS ONCE
Status: COMPLETED | OUTPATIENT
Start: 2022-06-29 | End: 2022-06-29

## 2022-06-29 RX ADMIN — DEXAMETHASONE 4 MG: 4 TABLET ORAL at 02:34

## 2022-06-29 RX ADMIN — ORPHENADRINE CITRATE 60 MG: 30 INJECTION INTRAMUSCULAR; INTRAVENOUS at 02:35

## 2022-06-29 RX ADMIN — KETOROLAC TROMETHAMINE 30 MG: 30 INJECTION, SOLUTION INTRAMUSCULAR; INTRAVENOUS at 02:35

## 2022-06-29 ASSESSMENT — ENCOUNTER SYMPTOMS
DIARRHEA: 0
SHORTNESS OF BREATH: 0
NAUSEA: 0
VOMITING: 0
BACK PAIN: 1
ABDOMINAL PAIN: 0
SORE THROAT: 0

## 2022-06-29 ASSESSMENT — PAIN DESCRIPTION - LOCATION: LOCATION: BACK

## 2022-06-29 ASSESSMENT — PAIN SCALES - GENERAL: PAINLEVEL_OUTOF10: 8

## 2022-06-29 ASSESSMENT — PAIN DESCRIPTION - ORIENTATION: ORIENTATION: MID;LOWER

## 2022-06-29 ASSESSMENT — PAIN - FUNCTIONAL ASSESSMENT: PAIN_FUNCTIONAL_ASSESSMENT: 0-10

## 2022-06-29 ASSESSMENT — PAIN DESCRIPTION - PAIN TYPE: TYPE: ACUTE PAIN

## 2022-06-29 NOTE — ED PROVIDER NOTES
[id number] [facility] ED  [address] [facility] [address] [facility] [place] [address]  Phone: [phone]  Fax: [phone] [facility]          Pt Name: Zainab Dave MRN: [id number]  Birthdate 1950  Date of evaluation: 6/29/2022      CHIEF COMPLAINT       Chief Complaint   Patient presents with    Back Pain     woke up with lower mid back pain. Denies injury       HISTORY OF PRESENT ILLNESS       Zainab Dave is a 67 y.o. male who presents with exacerbation of his chronic low back pain. Has had surgeries to that area of his back in the past about 3 years ago most recently. Does have a nerve stimulator as well. Symptoms began about 2 hours prior to arrival.  Took an extra Percocet which he reports is already helping. No neurologic symptoms such as numbness/weakness/paresthesias. No trauma. No fevers. No IV drug use. Denies any other symptoms or concerns. No saddle anesthesia or urinary/bowel retention or incontinence. REVIEW OF SYSTEMS       Review of Systems   Constitutional: Negative for chills and fever. HENT: Negative for sore throat. Respiratory: Negative for shortness of breath. Cardiovascular: Negative for chest pain. Gastrointestinal: Negative for abdominal pain, diarrhea, nausea and vomiting. Musculoskeletal: Positive for back pain. Negative for neck pain. Skin: Negative for rash. Neurological: Negative for weakness and numbness. PAST MEDICAL HISTORY    has a past medical history of Arthritis, COPD (chronic obstructive pulmonary disease) ([facility] [address] Diabetes mellitus ([facility] [address] and GURPREET on CPAP. SURGICAL HISTORY      has a past surgical history that includes Appendectomy (1952); Tonsillectomy (1959); Cholecystectomy, laparoscopic (1996); Total knee arthroplasty (Left, 02/01/2017); joint replacement (Right, 11/07/2017); lumbar laminectomy (10/24/2018); and Spinal Cord Stimulator Surgery (Right, 05/2021).     CURRENT MEDICATIONS       Discharge Medication List as of 6/29/2022  3:07 AM      CONTINUE these medications which have NOT CHANGED    Details   oxyCODONE-acetaminophen (PERCOCET)  MG per tablet Take 1 tablet by mouth every 8 hours as needed for Pain for up to 30 days. , Disp-90 tablet, R-0Normal      fluocinonide (LIDEX) 0.05 % cream apply topically twice a day as needed, Disp-60 g, R-0, Normal      celecoxib (CELEBREX) 200 MG capsule TAKE 1 CAPSULE BY MOUTH EVERY DAY, Disp-30 capsule, R-5Normal      !! hydroCHLOROthiazide (HYDRODIURIL) 25 MG tablet 1 tabletHistorical Med      gabapentin (NEURONTIN) 300 MG capsule Take 1 capsule by mouth 4 times daily for 30 days. , Disp-360 capsule, R-1Normal      Multiple Vitamins-Minerals (CENTRUM SILVER PO) Take by mouthHistorical Med      budesonide-formoterol (SYMBICORT) 160-4.5 MCG/ACT AERO Inhale 2 puffs into the lungs 2 times daily, Disp-10.2 g, R-5Normal      furosemide (LASIX) 20 MG tablet Take 1 tablet by mouth 2 times daily, Disp-180 tablet, R-3Normal      metFORMIN (GLUCOPHAGE) 1000 MG tablet Take 1 tablet by mouth daily (with breakfast), Disp-90 tablet, R-3Normal      atorvastatin (LIPITOR) 20 MG tablet Take 1 tablet by mouth nightly, Disp-90 tablet, R-3Normal      !! hydroCHLOROthiazide (HYDRODIURIL) 50 MG tablet Take 1 tablet by mouth daily, Disp-90 tablet, R-3Normal      benazepril (LOTENSIN) 20 MG tablet Take 1 tablet by mouth daily, Disp-90 tablet, R-2Normal      vitamin D 25 MCG (1000 UT) CAPS Take 2 capsules by mouth daily, Disp-60 capsule, R-5Normal      albuterol sulfate HFA (PROAIR HFA) 108 (90 Base) MCG/ACT inhaler Inhale 2 puffs into the lungs every 6 hours as needed for Wheezing, Disp-8.5 g, R-5Normal      ipratropium-albuterol (DUONEB) 0.5-2.5 (3) MG/3ML SOLN nebulizer solution Inhale 3 mLs into the lungs every 6 hours as needed for Shortness of Breath, Disp-360 mL, R-1Normal       !! - Potential duplicate medications found. Please discuss with provider.           ALLERGIES     is allergic to cheratussin ac [guaifenesin-codeine]. FAMILY HISTORY     has no family status information on file. family history is not on file. SOCIAL HISTORY      reports that he has been smoking cigarettes. He has a 42.00 pack-year smoking history. He has never used smokeless tobacco. He reports that he does not drink alcohol and does not use drugs. PHYSICAL EXAM     INITIAL VITALS:  height is 5' 7\" (1.702 m). His oral temperature is 98.1 °F (36.7 °C). His blood pressure is 152/80 (abnormal) and his pulse is 86. His respiration is 16 and oxygen saturation is 94%. Physical Exam  Constitutional:       General: He is not in acute distress. Appearance: He is well-developed. HENT:      Head: Normocephalic and atraumatic. Right Ear: External ear normal.      Left Ear: External ear normal.   Eyes:      General: Lids are normal.         Right eye: No discharge. Left eye: No discharge. Neck:      Trachea: No tracheal deviation. Cardiovascular:      Rate and Rhythm: Normal rate and regular rhythm. Pulmonary:      Effort: Pulmonary effort is normal.   Skin:     General: Skin is warm and dry. Neurological:      Mental Status: He is alert. GCS: GCS eye subscore is 4. GCS verbal subscore is 5. GCS motor subscore is 6. Sensory: Sensation is intact. Motor: Motor function is intact. Coordination: Coordination is intact. Gait: Gait is intact. Comments: Normal neurologic exam.  No focal deficits. Ambulating appropriately. No saddle anesthesia or evidence of cauda equina syndrome clinically. Psychiatric:         Behavior: Behavior normal.           DIFFERENTIAL DIAGNOSIS/ MDM:     Plan at this time will be to treat symptomatically. I do not feel imaging will be of any benefit or necessary at this time. This seems to be acute on chronic in nature. He does have Percocet at home.   Advised to follow-up with his PCP return right away if worsening or for new or concerning symptoms. Injections given. Patient is comfortable with the plan and knows to return sooner if worsening. The patient presents with low back pain without signs of spinal cord compression, cauda equina syndrome, infection, aneurysm or other serious etiology. The patient is neurologically intact and appears stable for discharge. No skin lesions were seen. The pulses are 2/4 bilaterally. The motor is 5/5 bilaterally. The sensation is intact. Given the extremely low risk of these diagnoses further testing and evaluation for these possibilities does not appear to be indicated at this time. The patient was advised to return to the Emergency Department for worsening pain, numbness, weakness, difficulty with urination or defecation, difficulty with ambulation, fever, abdominal pain, coolness or color change to the extremity. The patient understands that at this time there is no evidence for a more malignant underlying process, but the patient also understands that early in the process of an illness or injury, an emergency department workup can be falsely reassuring. Routine discharge counseling was given, and the patient understands that worsening, changing or persistent symptoms should prompt an immediate call or follow up with their primary physician or return to the emergency department. The importance of appropriate follow up was also discussed. I have reviewed the disposition diagnosis with the patient and or their family/guardian. I have answered their questions and given discharge instructions. They voiced understanding of these instructions and did not have any further questions or complaints.           DIAGNOSTIC RESULTS     EKG: All EKG's are interpreted by the Emergency Department Physician who either signs or Co-signs this chart in the absence of a cardiologist.        RADIOLOGY:   Interpretation per the Radiologist below, if available at the time of this note:  No orders to display No results found. LABS:  No results found for this visit on 06/29/22. EMERGENCY DEPARTMENT COURSE:     The patient was given the following medications:  Orders Placed This Encounter   Medications    ketorolac (TORADOL) injection 30 mg    orphenadrine (NORFLEX) injection 60 mg    dexamethasone (DECADRON) tablet 4 mg    cyclobenzaprine (FLEXERIL) 10 MG tablet     Sig: Take 1 tablet by mouth 3 times daily as needed for Muscle spasms     Dispense:  30 tablet     Refill:  0        Vitals:    Vitals:    06/29/22 0209   BP: (!) 152/80   Pulse: 86   Resp: 16   Temp: 98.1 °F (36.7 °C)   TempSrc: Oral   SpO2: 94%   Height: 5' 7\" (1.702 m)     -------------------------  BP: (!) 152/80, Temp: 98.1 °F (36.7 °C), Heart Rate: 86, Resp: 16        CONSULTS:    None    CRITICAL CARE:     None    PROCEDURES:    None    FINAL IMPRESSION      1. Back strain, initial encounter          DISPOSITION/PLAN   DISPOSITION Decision To Discharge 06/29/2022 02:48:31 AM      Condition on Disposition    Improved    PATIENT REFERRED TO:  Sd Painter.   [address]  [facility] [address] [phone]    Schedule an appointment as soon as possible for a visit in 3 days        DISCHARGE MEDICATIONS:  Discharge Medication List as of 6/29/2022  3:07 AM      START taking these medications    Details   cyclobenzaprine (FLEXERIL) 10 MG tablet Take 1 tablet by mouth 3 times daily as needed for Muscle spasms, Disp-30 tablet, R-0Normal             (Please note that portions of this note were completed with a voice recognition program.  Efforts were made to edit the dictations but occasionally words are mis-transcribed.)    Juventino Roe DO, DO  Attending Emergency Physician         Juventino Roe DO  06/29/22 3253

## 2022-07-12 ENCOUNTER — OFFICE VISIT (OUTPATIENT)
Dept: PRIMARY CARE CLINIC | Age: 72
End: 2022-07-12
Payer: MEDICARE

## 2022-07-12 VITALS
HEART RATE: 90 BPM | WEIGHT: 270.6 LBS | OXYGEN SATURATION: 93 % | BODY MASS INDEX: 42.38 KG/M2 | SYSTOLIC BLOOD PRESSURE: 118 MMHG | DIASTOLIC BLOOD PRESSURE: 62 MMHG

## 2022-07-12 DIAGNOSIS — G89.29 CHRONIC MIDLINE LOW BACK PAIN WITHOUT SCIATICA: ICD-10-CM

## 2022-07-12 DIAGNOSIS — M51.36 DDD (DEGENERATIVE DISC DISEASE), LUMBAR: Primary | ICD-10-CM

## 2022-07-12 DIAGNOSIS — R21 RASH OF HAND: ICD-10-CM

## 2022-07-12 DIAGNOSIS — M54.50 CHRONIC MIDLINE LOW BACK PAIN WITHOUT SCIATICA: ICD-10-CM

## 2022-07-12 PROCEDURE — 1123F ACP DISCUSS/DSCN MKR DOCD: CPT | Performed by: FAMILY MEDICINE

## 2022-07-12 PROCEDURE — 99213 OFFICE O/P EST LOW 20 MIN: CPT | Performed by: FAMILY MEDICINE

## 2022-07-12 ASSESSMENT — ENCOUNTER SYMPTOMS
RHINORRHEA: 0
SHORTNESS OF BREATH: 0
VOMITING: 0
EYE REDNESS: 0
SORE THROAT: 0
DIARRHEA: 0
NAUSEA: 0
COUGH: 0
EYE DISCHARGE: 0
WHEEZING: 0
ABDOMINAL PAIN: 0

## 2022-07-12 NOTE — PROGRESS NOTES
[facility] PRIMARY CARE  [address] [address]  Dept: [address] [facility] Olivier is a 67 y.o. male Established patient, who presents today for his medical conditions/complaints as noted below. Chief Complaint   Patient presents with    Back Pain     Patient went to [facility] ER on 6/29/22 due to back pain. Patient is currently at a 3. HPI:     HPI  Patient is a 68 y/o male presenting to the clinic today for evaluation of chronic back pain. He was seen on 6/29/22 in the ED. He woke up in the middle of the night with severe back pain. Patient was prescribed flexeril and states that has not helped much. He takes about 3 percocet a day. Denies paresthesia, weakness, or numbness in his legs. Previous lumbar laminectomy in 2018 by Dr. Jeppie Boas. Patients pain has improved since the visit to the ED. Patient states that he has seen pain management in the past and did not have much relief. He denies any injections in his back. Left hand has continued to have dry areas. Patient states that the cream has not been helping.      Reviewed prior notes Previous PCP  Reviewed previous Labs and Hospital Records    LDL Cholesterol (mg/dL)   Date Value   05/11/2022 70   01/11/2021 25     LDL Calculated (mg/dL)   Date Value   01/12/2018 65       (goal LDL is <100)   AST (U/L)   Date Value   05/11/2022 26     ALT (U/L)   Date Value   05/11/2022 25     BUN (mg/dL)   Date Value   05/11/2022 17     Hemoglobin A1C (%)   Date Value   05/10/2022 6.4     TSH (uIU/mL)   Date Value   05/11/2022 3.00     BP Readings from Last 3 Encounters:   07/12/22 118/62   06/29/22 (!) 152/80   05/10/22 134/70          (goal 120/80)    Past Medical History:   Diagnosis Date    Arthritis     COPD (chronic obstructive pulmonary disease) ([facility] [address]     Diabetes mellitus ([facility] [address]     GURPREET on CPAP       Past Surgical History:   Procedure Laterality Date    APPENDECTOMY  1952    CHOLECYSTECTOMY, LAPAROSCOPIC  1996    JOINT REPLACEMENT Right 2017    LUMBAR LAMINECTOMY  10/24/2018    L2-L5 laminectomy, foraminotomy    SPINAL CORD STIMULATOR SURGERY Right 2021    TONSILLECTOMY      TOTAL KNEE ARTHROPLASTY Left 2017       No family history on file. Social History     Tobacco Use    Smoking status: Current Every Day Smoker     Packs/day: 1.00     Years: 42.00     Pack years: 42.00     Types: Cigarettes     Last attempt to quit: 10/12/2017     Years since quittin.7    Smokeless tobacco: Never Used   Substance Use Topics    Alcohol use: No      Current Outpatient Medications   Medication Sig Dispense Refill    oxyCODONE-acetaminophen (PERCOCET)  MG per tablet Take 1 tablet by mouth every 8 hours as needed for Pain for up to 30 days. 90 tablet 0    fluocinonide (LIDEX) 0.05 % cream apply topically twice a day as needed 60 g 0    celecoxib (CELEBREX) 200 MG capsule TAKE 1 CAPSULE BY MOUTH EVERY DAY 30 capsule 5    gabapentin (NEURONTIN) 300 MG capsule Take 1 capsule by mouth 4 times daily for 30 days.  360 capsule 1    Multiple Vitamins-Minerals (CENTRUM SILVER PO) Take by mouth      budesonide-formoterol (SYMBICORT) 160-4.5 MCG/ACT AERO Inhale 2 puffs into the lungs 2 times daily 10.2 g 5    metFORMIN (GLUCOPHAGE) 1000 MG tablet Take 1 tablet by mouth daily (with breakfast) 90 tablet 3    atorvastatin (LIPITOR) 20 MG tablet Take 1 tablet by mouth nightly 90 tablet 3    benazepril (LOTENSIN) 20 MG tablet Take 1 tablet by mouth daily 90 tablet 2    albuterol sulfate HFA (PROAIR HFA) 108 (90 Base) MCG/ACT inhaler Inhale 2 puffs into the lungs every 6 hours as needed for Wheezing 8.5 g 5    ipratropium-albuterol (DUONEB) 0.5-2.5 (3) MG/3ML SOLN nebulizer solution Inhale 3 mLs into the lungs every 6 hours as needed for Shortness of Breath 360 mL 1    hydroCHLOROthiazide (HYDRODIURIL) 25 MG tablet 1 tablet (Patient not taking: Reported on 2022)  furosemide (LASIX) 20 MG tablet Take 1 tablet by mouth 2 times daily (Patient not taking: Reported on 7/12/2022) 180 tablet 3    hydroCHLOROthiazide (HYDRODIURIL) 50 MG tablet Take 1 tablet by mouth daily (Patient not taking: Reported on 7/12/2022) 90 tablet 3    vitamin D 25 MCG (1000 UT) CAPS Take 2 capsules by mouth daily (Patient not taking: Reported on 7/12/2022) 60 capsule 5     No current facility-administered medications for this visit. Allergies   Allergen Reactions    Cheratussin Ac [Guaifenesin-Codeine] Other (See Comments)     Coughing Attack. Health Maintenance   Topic Date Due    Diabetic retinal exam  Never done    Shingles vaccine (1 of 2) Never done    Diabetic foot exam  07/28/2022    Flu vaccine (1) 09/01/2022    Low dose CT lung screening  12/29/2022    Diabetic microalbuminuria test  02/01/2023    Depression Screen  02/01/2023    Annual Wellness Visit (AWV)  02/02/2023    A1C test (Diabetic or Prediabetic)  05/10/2023    Lipids  05/11/2023    Colorectal Cancer Screen  11/15/2024    DTaP/Tdap/Td vaccine (3 - Td or Tdap) 07/27/2027    Pneumococcal 65+ years Vaccine  Completed    COVID-19 Vaccine  Completed    AAA screen  Completed    Hepatitis C screen  Completed    Hepatitis A vaccine  Aged Out    Hib vaccine  Aged Out    Meningococcal (ACWY) vaccine  Aged Out       Subjective:      Review of Systems   Constitutional: Negative for chills and fever. HENT: Negative for rhinorrhea and sore throat. Eyes: Negative for discharge and redness. Respiratory: Negative for cough, shortness of breath and wheezing. Cardiovascular: Negative for chest pain and palpitations. Gastrointestinal: Negative for abdominal pain, diarrhea, nausea and vomiting. Genitourinary: Negative for dysuria and frequency. Musculoskeletal: Negative for arthralgias and myalgias. Neurological: Negative for dizziness, light-headedness and headaches.    Psychiatric/Behavioral: Negative for sleep disturbance. Objective:     /62   Pulse 90   Wt 270 lb 9.6 oz (122.7 kg)   SpO2 93%   BMI 42.38 kg/m²   Physical Exam  Vitals and nursing note reviewed. Constitutional:       General: He is not in acute distress. Appearance: He is well-developed. He is not ill-appearing. HENT:      Head: Normocephalic and atraumatic. Right Ear: External ear normal.      Left Ear: External ear normal.   Eyes:      General: No scleral icterus. Right eye: No discharge. Left eye: No discharge. Conjunctiva/sclera: Conjunctivae normal.   Neck:      Thyroid: No thyromegaly. Trachea: No tracheal deviation. Cardiovascular:      Rate and Rhythm: Normal rate and regular rhythm. Heart sounds: Normal heart sounds. Pulmonary:      Effort: Pulmonary effort is normal. No respiratory distress. Breath sounds: Normal breath sounds. No wheezing. Lymphadenopathy:      Cervical: No cervical adenopathy. Skin:     General: Skin is warm. Findings: No rash. Neurological:      Mental Status: He is alert and oriented to person, place, and time. Psychiatric:         Mood and Affect: Mood normal.         Behavior: Behavior normal.         Thought Content: Thought content normal.         Assessment:       Diagnosis Orders   1. DDD (degenerative disc disease), lumbar     2. Chronic midline low back pain without sciatica     3. Rash of hand  AFL - Rosa Elena Tinsley MD, Dermatology, [facility]:     refer to dermatology for second opinion  Continue on medications. Encouraged to cut back on smoking. Patient declines physical therapy and pain management for his back. Return in about 3 months (around 10/12/2022) for DIABETES.     Orders Placed This Encounter   Procedures   Alberto Jacques MD, Dermatology, [place]     Referral Priority:   Routine     Referral Type:   Eval and Treat     Referral Reason:   Specialty Services Required     Referred to Provider:   Faisal Ernst Alon Keating MD     Requested Specialty:   Dermatology     Number of Visits Requested:   1     No orders of the defined types were placed in this encounter. Patient given educational materials - see patient instructions. Discussed use, benefit, and side effects of prescribed medications. All patient questions answered. Pt voiced understanding. Reviewed health maintenance. Instructed to continue current medications, diet andexercise. Patient agreed with treatment plan. Follow up as directed.      Electronicallysigned by Art Kemp MD on 7/12/2022 at 8:48 AM

## 2022-07-15 RX ORDER — BENAZEPRIL HYDROCHLORIDE 20 MG/1
TABLET ORAL
Qty: 90 TABLET | Refills: 0 | Status: SHIPPED | OUTPATIENT
Start: 2022-07-15 | End: 2022-08-04 | Stop reason: SDUPTHER

## 2022-07-21 DIAGNOSIS — M54.50 ACUTE MIDLINE LOW BACK PAIN WITHOUT SCIATICA: ICD-10-CM

## 2022-07-22 RX ORDER — OXYCODONE AND ACETAMINOPHEN 10; 325 MG/1; MG/1
1 TABLET ORAL EVERY 8 HOURS PRN
Qty: 90 TABLET | Refills: 0 | Status: SHIPPED | OUTPATIENT
Start: 2022-07-22 | End: 2022-08-19 | Stop reason: SDUPTHER

## 2022-08-04 DIAGNOSIS — E11.9 CONTROLLED TYPE 2 DIABETES MELLITUS WITHOUT COMPLICATION, WITHOUT LONG-TERM CURRENT USE OF INSULIN (HCC): ICD-10-CM

## 2022-08-04 RX ORDER — ATORVASTATIN CALCIUM 20 MG/1
20 TABLET, FILM COATED ORAL NIGHTLY
Qty: 100 TABLET | Refills: 3 | Status: SHIPPED | OUTPATIENT
Start: 2022-08-04

## 2022-08-04 RX ORDER — BENAZEPRIL HYDROCHLORIDE 20 MG/1
TABLET ORAL
Qty: 100 TABLET | Refills: 3 | Status: SHIPPED | OUTPATIENT
Start: 2022-08-04

## 2022-08-10 ENCOUNTER — TELEPHONE (OUTPATIENT)
Dept: PHARMACY | Facility: CLINIC | Age: 72
End: 2022-08-10

## 2022-08-10 NOTE — TELEPHONE ENCOUNTER
[facility] CLINICAL PHARMACY: ADHERENCE REVIEW  Identified care gap per [employer]: fills at [facility]: ACE/ARB, Diabetes, and Statin adherence    Last Visit: 7/12/22    Patient found in [facility] and is currently eligible for TIP    ASSESSMENT  ACE/ARB ADHERENCE    Insurance Records claims through 7/17/22 (Prior Year [place] [place] =  n/a%; YTD [place] = Filled only once; Potential Fail Date: 8/26/22 ):   Benazepril 20 mg last filled on 6/18/22 for 30 day supply. Next refill due: 7/18/22    Per chart review, 100 day supply prescription sent to [facility] 8/4/22    Per [facility], 30 day supply was picked up 7/20/22. 100 day supply on profile for next fill. BP Readings from Last 3 Encounters:   07/12/22 118/62   06/29/22 (!) 152/80   05/10/22 134/70     Estimated Creatinine Clearance: 104 mL/min (based on SCr of 0.79 mg/dL). DIABETES ADHERENCE    Insurance Records claims through 7/17/22 (Prior Year Sd Todd = n/a%; YTD [place] [place] = Filled only once; Potential Fail Date: 8/26/22): Metformin 1000 mg last filled on 6/18/22 for 30 day supply. Next refill due: 7/18/22    Per chart review, 100 day supply prescription sent to [facility] 8/4/22    Per [facility], 30 day supply was picked up 7/20/22. 100 day supply on profile for next fill. Lab Results   Component Value Date    LABA1C 6.4 05/10/2022    LABA1C 5.7 02/01/2022    LABA1C 6.1 11/01/2021     NOTE A1c <9%    STATIN ADHERENCE    Insurance Records claims through 7/17/22 (Prior Year Sd Johnra = n/a%; YTD [place] = Filled only once; Potential Fail Date: 8/26/22): Atorvastatin 20 mg tablets last filled on 6/18/22 for 30 day supply. Next refill due: 7/18/22    Per chart review, 100 day supply prescription sent to [facility] 8/4/22    Per [facility], 30 day supply was picked up 7/20/22. 100 day supply on profile for next fill.     Lab Results   Component Value Date    CHOL 133 01/12/2018    TRIG 123 01/12/2018    HDL 32 (L) 05/11/2022    LDLCHOLESTEROL 70 05/11/2022    LDLCALC 65 01/12/2018     ALT   Date Value Ref Range Status   05/11/2022 25 5 - 41 U/L Final     AST   Date Value Ref Range Status   05/11/2022 26 <40 U/L Final     The 10-year ASCVD risk score (Shani Sweet, et al., 2013) is: 38.2%    Values used to calculate the score:      Age: 67 years      Sex: Male      Is Non- : No      Diabetic: Yes      Tobacco smoker: Yes      Systolic Blood Pressure: 743 mmHg      Is BP treated: No      HDL Cholesterol: 32 mg/dL      Total Cholesterol: 136 mg/dL     PLAN  The following are interventions that have been identified:   - Patient eligible for 100 day supply of metformin, benazepril, and atorvastatin. On the profile at pharmacy and can be used for next fill when due. Attempting to reach patient to review. Left message asking for return call.     Future Appointments   Date Time Provider Ambika Young   10/13/2022  9:00 AM MD GABI Skinner 60, PharmD, [facility], [address]  Department, toll free: [phone], option 1    For [id number] in place:  No  Gap Closed?: Yes   Time Spent (min): 10

## 2022-08-14 DIAGNOSIS — J44.9 CHRONIC OBSTRUCTIVE PULMONARY DISEASE, UNSPECIFIED COPD TYPE (HCC): ICD-10-CM

## 2022-08-16 RX ORDER — HYDROCHLOROTHIAZIDE 50 MG/1
TABLET ORAL
Qty: 90 TABLET | Refills: 0 | Status: SHIPPED | OUTPATIENT
Start: 2022-08-16

## 2022-08-18 DIAGNOSIS — M54.50 ACUTE MIDLINE LOW BACK PAIN WITHOUT SCIATICA: ICD-10-CM

## 2022-08-18 NOTE — TELEPHONE ENCOUNTER
LAST VISIT:   7/12/2022     Future Appointments   Date Time Provider Ambika Hannah   10/13/2022  9:00 AM Leyla Oconnor MD [facility] [address] [employer]

## 2022-08-19 RX ORDER — OXYCODONE AND ACETAMINOPHEN 10; 325 MG/1; MG/1
1 TABLET ORAL EVERY 8 HOURS PRN
Qty: 90 TABLET | Refills: 0 | Status: SHIPPED | OUTPATIENT
Start: 2022-08-19 | End: 2022-09-20 | Stop reason: SDUPTHER

## 2022-09-02 ENCOUNTER — TELEPHONE (OUTPATIENT)
Dept: PRIMARY CARE CLINIC | Age: 72
End: 2022-09-02

## 2022-09-02 NOTE — TELEPHONE ENCOUNTER
Patient is asking for a new handicap letter . Okay to give ?    Patient know it will be at the  once completed

## 2022-09-15 RX ORDER — GABAPENTIN 300 MG/1
CAPSULE ORAL
Qty: 360 CAPSULE | Refills: 1 | Status: SHIPPED | OUTPATIENT
Start: 2022-09-15 | End: 2022-12-14

## 2022-09-20 DIAGNOSIS — M54.50 ACUTE MIDLINE LOW BACK PAIN WITHOUT SCIATICA: ICD-10-CM

## 2022-09-20 RX ORDER — CELECOXIB 200 MG/1
CAPSULE ORAL
Qty: 30 CAPSULE | Refills: 5 | Status: SHIPPED | OUTPATIENT
Start: 2022-09-20

## 2022-09-20 RX ORDER — OXYCODONE AND ACETAMINOPHEN 10; 325 MG/1; MG/1
1 TABLET ORAL EVERY 8 HOURS PRN
Qty: 90 TABLET | Refills: 0 | Status: SHIPPED | OUTPATIENT
Start: 2022-09-20 | End: 2022-10-20 | Stop reason: SDUPTHER

## 2022-10-05 ENCOUNTER — HOSPITAL ENCOUNTER (EMERGENCY)
Age: 72
Discharge: HOME | End: 2022-10-05
Attending: EMERGENCY MEDICINE
Payer: MEDICARE

## 2022-10-05 ENCOUNTER — APPOINTMENT (OUTPATIENT)
Dept: GENERAL RADIOLOGY | Age: 72
End: 2022-10-05
Payer: MEDICARE

## 2022-10-05 VITALS
RESPIRATION RATE: 16 BRPM | TEMPERATURE: 97.7 F | OXYGEN SATURATION: 94 % | DIASTOLIC BLOOD PRESSURE: 92 MMHG | HEART RATE: 80 BPM | BODY MASS INDEX: 38.04 KG/M2 | WEIGHT: 251 LBS | SYSTOLIC BLOOD PRESSURE: 124 MMHG | HEIGHT: 68 IN

## 2022-10-05 DIAGNOSIS — M25.522 LEFT ELBOW PAIN: Primary | ICD-10-CM

## 2022-10-05 PROCEDURE — 99283 EMERGENCY DEPT VISIT LOW MDM: CPT

## 2022-10-05 PROCEDURE — 73080 X-RAY EXAM OF ELBOW: CPT

## 2022-10-05 RX ORDER — TRAZODONE HYDROCHLORIDE 50 MG/1
TABLET ORAL
COMMUNITY
Start: 2022-09-17 | End: 2022-10-24

## 2022-10-05 ASSESSMENT — PAIN DESCRIPTION - FREQUENCY: FREQUENCY: CONTINUOUS

## 2022-10-05 ASSESSMENT — PAIN DESCRIPTION - ORIENTATION: ORIENTATION: LEFT

## 2022-10-05 ASSESSMENT — PAIN DESCRIPTION - DESCRIPTORS: DESCRIPTORS: SHARP

## 2022-10-05 ASSESSMENT — PAIN SCALES - GENERAL: PAINLEVEL_OUTOF10: 8

## 2022-10-05 ASSESSMENT — PAIN DESCRIPTION - LOCATION: LOCATION: ELBOW

## 2022-10-05 ASSESSMENT — PAIN DESCRIPTION - PAIN TYPE: TYPE: ACUTE PAIN

## 2022-10-05 NOTE — ED PROVIDER NOTES
[address] [facility] ED  [address] [facility] [address]  Phone: [phone]  Fax: [phone]      Pt Name: Musa Sanchez  Birthdate 1950  Date of evaluation: 10/5/2022      CHIEF COMPLAINT       Chief Complaint   Patient presents with    Elbow Pain     Left elbow, began last night after work. Denies injury       HISTORY OF PRESENT ILLNESS   14-year-old male presents to the emergency department today complaint left elbow pain. Pain started yesterday. He denies any known injury or trauma. Range of motion and palpation makes the pain worse. Pain does not radiate anywhere. Pain on a scale of 0-10 is an 8. He is right-hand dominant. No distal deficits. No constitutional symptoms. Is been no other contemporaneous evaluation or management of the symptoms prior to arrival.    REVIEWOF SYSTEMS     Review of Systems   All other systems reviewed and are negative. PAST MEDICAL HISTORY    has a past medical history of Arthritis, COPD (chronic obstructive pulmonary disease) ([address] Diabetes mellitus ([facility] [address] and GURPREET on CPAP. SURGICAL HISTORY      has a past surgical history that includes Appendectomy (1952); Tonsillectomy (1959); Cholecystectomy, laparoscopic (1996); Total knee arthroplasty (Left, 02/01/2017); joint replacement (Right, 11/07/2017); lumbar laminectomy (10/24/2018); and Spinal Cord Stimulator Surgery (Right, 05/2021).     CURRENTMEDICATIONS       Previous Medications    ALBUTEROL SULFATE HFA (PROAIR HFA) 108 (90 BASE) MCG/ACT INHALER    Inhale 2 puffs into the lungs every 6 hours as needed for Wheezing    ATORVASTATIN (LIPITOR) 20 MG TABLET    Take 1 tablet by mouth nightly    BENAZEPRIL (LOTENSIN) 20 MG TABLET    TAKE 1 TABLET BY MOUTH EVERY DAY    BUDESONIDE-FORMOTEROL (SYMBICORT) 160-4.5 MCG/ACT AERO    Inhale 2 puffs into the lungs 2 times daily    CELECOXIB (CELEBREX) 200 MG CAPSULE    TAKE 1 CAPSULE BY MOUTH EVERY DAY    FLUOCINONIDE (LIDEX) 0.05 % CREAM apply topically twice a day as needed    FUROSEMIDE (LASIX) 20 MG TABLET    Take 1 tablet by mouth 2 times daily    GABAPENTIN (NEURONTIN) 300 MG CAPSULE    TAKE 1 CAPSULE BY MOUTH FOUR TIMES A DAY    HYDROCHLOROTHIAZIDE (HYDRODIURIL) 25 MG TABLET    1 tablet    HYDROCHLOROTHIAZIDE (HYDRODIURIL) 50 MG TABLET    TAKE 1 TABLET BY MOUTH EVERY DAY    IPRATROPIUM-ALBUTEROL (DUONEB) 0.5-2.5 (3) MG/3ML SOLN NEBULIZER SOLUTION    Inhale 3 mLs into the lungs every 6 hours as needed for Shortness of Breath    METFORMIN (GLUCOPHAGE) 1000 MG TABLET    Take 1 tablet by mouth daily (with breakfast)    MULTIPLE VITAMINS-MINERALS (CENTRUM SILVER PO)    Take by mouth    OXYCODONE-ACETAMINOPHEN (PERCOCET)  MG PER TABLET    Take 1 tablet by mouth every 8 hours as needed for Pain for up to 30 days. TRAZODONE (DESYREL) 50 MG TABLET        VITAMIN D 25 MCG (1000 UT) CAPS    Take 2 capsules by mouth daily       ALLERGIES     is allergic to cheratussin ac [guaifenesin-codeine]. FAMILY HISTORY     has no family status information on file. family history is not on file. SOCIAL HISTORY      reports that he has been smoking cigarettes. He has a 42.00 pack-year smoking history. He has never used smokeless tobacco. He reports that he does not drink alcohol and does not use drugs. PHYSICAL EXAM     INITIAL VITALS:  height is 5' 8\" (1.727 m) and weight is 113.9 kg (251 lb). His oral temperature is 97.7 °F (36.5 °C). His blood pressure is 124/92 (abnormal) and his pulse is 80. His respiration is 16 and oxygen saturation is 94%. Physical Exam  Vitals reviewed. Constitutional:       Appearance: He is well-developed. HENT:      Head: Normocephalic and atraumatic. Eyes:      Conjunctiva/sclera: Conjunctivae normal.      Pupils: Pupils are equal, round, and reactive to light. Neck:      Trachea: No tracheal deviation. Cardiovascular:      Rate and Rhythm: Normal rate and regular rhythm.    Pulmonary:      Effort: Pulmonary effort is normal.      Breath sounds: Normal breath sounds. Musculoskeletal:         General: Tenderness present. Normal range of motion. Cervical back: Normal range of motion and neck supple. Comments: There is point tenderness over the lateral epicondyle of this patient's left elbow as well as to the distal portion of the insertion of the triceps tendon. Skin:     General: Skin is warm and dry. Findings: No rash. Neurological:      Mental Status: He is alert and oriented to person, place, and time. Psychiatric:         Behavior: Behavior normal.         Thought Content: Thought content normal.         Judgment: Judgment normal.         MDM:   Mid no swelling of this patient's elbow consistent with likely olecranon bursitis. Seem more likely this is a tendinitis. X-rays been ordered. XR ELBOW LEFT (MIN 3 VIEWS)    Result Date: 10/5/2022  EXAMINATION: THREE XRAY VIEWS OF THE LEFT ELBOW 10/5/2022 11:01 am COMPARISON: None. HISTORY: ORDERING SYSTEM PROVIDED HISTORY: Elbow pain TECHNOLOGIST PROVIDED HISTORY: Elbow pain Reason for Exam: C/o elbow pain after work last night. FINDINGS: Advanced elbow arthropathy with moderate hypertrophic changes. No acute osseous abnormality or malalignment identified. No significant joint effusion identified. Moderately severe arthropathy in the elbow. No acute osseous abnormality identified. Has been placed in a sling. Supportive care instructions have been given. I will refer him on to Ortho. The patient was given the following medications:  No orders of the defined types were placed in this encounter. FINAL IMPRESSION      1. Left elbow pain          DISPOSITION/PLAN   DISPOSITION Decision To Discharge 10/05/2022 11:47:49 AM      Condition on Disposition  Good    PATIENT REFERRED TO:  Dnoa Reyna MD  [address]   [address] [facility] [address]  [phone]    Schedule an appointment as soon as possible for a visit in 2 days      Liane Kel, [address] [place] [address]  [phone]    Schedule an appointment as soon as possible for a visit in 2 days      DISCHARGE MEDICATIONS:  New Prescriptions    No medications on file       (Please note that portions of this note werecompleted with a voice recognition program.  Efforts were made to edit the dictations but occasionally words are mis-transcribed.)    Heriberto Martin MD MD, F.A.C.E.P, F.A.A.E.M  Emergency Physician Attending          Heriberto Martin MD  10/05/22 2127

## 2022-10-05 NOTE — Clinical Note
Alexandru Berg was seen and treated in our emergency department on 10/5/2022. He may return to work on 10/07/2022. If you have any questions or concerns, please don't hesitate to call.       Catarino Livingston MD

## 2022-10-20 DIAGNOSIS — M54.50 ACUTE MIDLINE LOW BACK PAIN WITHOUT SCIATICA: ICD-10-CM

## 2022-10-20 RX ORDER — OXYCODONE AND ACETAMINOPHEN 10; 325 MG/1; MG/1
1 TABLET ORAL EVERY 8 HOURS PRN
Qty: 90 TABLET | Refills: 0 | Status: SHIPPED | OUTPATIENT
Start: 2022-10-20 | End: 2022-11-19

## 2022-10-24 RX ORDER — TRAZODONE HYDROCHLORIDE 50 MG/1
TABLET ORAL
Qty: 90 TABLET | Refills: 0 | Status: SHIPPED | OUTPATIENT
Start: 2022-10-24 | End: 2022-10-28 | Stop reason: SDUPTHER

## 2022-10-28 ENCOUNTER — TELEPHONE (OUTPATIENT)
Dept: PRIMARY CARE CLINIC | Age: 72
End: 2022-10-28

## 2022-10-28 RX ORDER — TRAZODONE HYDROCHLORIDE 50 MG/1
TABLET ORAL
Qty: 90 TABLET | Refills: 3 | Status: SHIPPED | OUTPATIENT
Start: 2022-10-28

## 2022-10-28 NOTE — TELEPHONE ENCOUNTER
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 [id number] [address] for note? If approved email to pt at [email or web address]

## 2022-11-02 ENCOUNTER — OFFICE VISIT (OUTPATIENT)
Dept: PRIMARY CARE CLINIC | Age: 72
End: 2022-11-02
Payer: MEDICARE

## 2022-11-02 VITALS
SYSTOLIC BLOOD PRESSURE: 136 MMHG | HEART RATE: 84 BPM | DIASTOLIC BLOOD PRESSURE: 70 MMHG | HEIGHT: 68 IN | WEIGHT: 278.8 LBS | BODY MASS INDEX: 42.25 KG/M2 | OXYGEN SATURATION: 94 %

## 2022-11-02 DIAGNOSIS — M96.1 POST LAMINECTOMY SYNDROME: ICD-10-CM

## 2022-11-02 DIAGNOSIS — J44.9 CHRONIC OBSTRUCTIVE PULMONARY DISEASE, UNSPECIFIED COPD TYPE (HCC): ICD-10-CM

## 2022-11-02 DIAGNOSIS — Z23 NEED FOR VACCINATION: ICD-10-CM

## 2022-11-02 DIAGNOSIS — Z79.899 HIGH RISK MEDICATION USE: ICD-10-CM

## 2022-11-02 DIAGNOSIS — Z99.89 OSA ON CPAP: ICD-10-CM

## 2022-11-02 DIAGNOSIS — G47.33 OSA ON CPAP: ICD-10-CM

## 2022-11-02 DIAGNOSIS — E11.9 CONTROLLED TYPE 2 DIABETES MELLITUS WITHOUT COMPLICATION, WITHOUT LONG-TERM CURRENT USE OF INSULIN (HCC): Primary | ICD-10-CM

## 2022-11-02 LAB
ALCOHOL URINE: NORMAL
AMPHETAMINE SCREEN, URINE: NEGATIVE
BARBITURATE SCREEN, URINE: NEGATIVE
BENZODIAZEPINE SCREEN, URINE: NEGATIVE
BUPRENORPHINE URINE: NEGATIVE
COCAINE METABOLITE SCREEN URINE: NEGATIVE
FENTANYL SCREEN, URINE: NORMAL
GABAPENTIN SCREEN, URINE: NORMAL
HBA1C MFR BLD: 6.3 %
MDMA URINE: NEGATIVE
METHADONE SCREEN, URINE: NEGATIVE
METHAMPHETAMINE, URINE: NEGATIVE
OPIATE SCREEN URINE: NEGATIVE
OXYCODONE SCREEN URINE: POSITIVE
PHENCYCLIDINE SCREEN URINE: NEGATIVE
PROPOXYPHENE SCREEN, URINE: NEGATIVE
SYNTHETIC CANNABINOIDS(K2) SCREEN, URINE: NORMAL
THC SCREEN, URINE: NEGATIVE
TRAMADOL SCREEN URINE: NORMAL
TRICYCLIC ANTIDEPRESSANTS, UR: NEGATIVE

## 2022-11-02 PROCEDURE — 99214 OFFICE O/P EST MOD 30 MIN: CPT | Performed by: FAMILY MEDICINE

## 2022-11-02 PROCEDURE — 3044F HG A1C LEVEL LT 7.0%: CPT | Performed by: FAMILY MEDICINE

## 2022-11-02 PROCEDURE — G0008 ADMIN INFLUENZA VIRUS VAC: HCPCS | Performed by: FAMILY MEDICINE

## 2022-11-02 PROCEDURE — 83036 HEMOGLOBIN GLYCOSYLATED A1C: CPT | Performed by: FAMILY MEDICINE

## 2022-11-02 PROCEDURE — 90694 VACC AIIV4 NO PRSRV 0.5ML IM: CPT | Performed by: FAMILY MEDICINE

## 2022-11-02 PROCEDURE — 1123F ACP DISCUSS/DSCN MKR DOCD: CPT | Performed by: FAMILY MEDICINE

## 2022-11-02 PROCEDURE — 80305 DRUG TEST PRSMV DIR OPT OBS: CPT | Performed by: FAMILY MEDICINE

## 2022-11-02 ASSESSMENT — ENCOUNTER SYMPTOMS
VOMITING: 0
EYE DISCHARGE: 0
WHEEZING: 0
ABDOMINAL PAIN: 0
SHORTNESS OF BREATH: 0
COUGH: 0
DIARRHEA: 0
NAUSEA: 0
SORE THROAT: 0
RHINORRHEA: 0
EYE REDNESS: 0

## 2022-11-02 NOTE — PROGRESS NOTES
[facility]  [address] [address]  Dept: [facility] Cheyenne Olivier is a 67 y.o. male Established patient, who presents today for his medical conditions/complaints as noted below. Chief Complaint   Patient presents with    Diabetes       HPI:     HPI  Pt here for diabetic check. No low blood sugar reactions. No fever or chills. Using about 3 percocet per day. Still with some sob with exercise. Patient states still smoking but trying to cut down. Does show some mild dyspnea at rest  Patient has not been actively trying to lose weight. Does not check his blood pressures outside the office. Patient states he has sleep apnea. Uses his CPAP machine on a nightly basis for at least 6 hours. States able to stay asleep. Does feel more refreshed in the daytime. Reviewed prior notes None  Reviewed previous Labs    LDL Cholesterol (mg/dL)   Date Value   05/11/2022 70   01/11/2021 25     LDL Calculated (mg/dL)   Date Value   01/12/2018 65       (goal LDL is <100)   AST (U/L)   Date Value   05/11/2022 26     ALT (U/L)   Date Value   05/11/2022 25     BUN (mg/dL)   Date Value   05/11/2022 17     Hemoglobin A1C (%)   Date Value   11/02/2022 6.3     TSH (uIU/mL)   Date Value   05/11/2022 3.00     BP Readings from Last 3 Encounters:   11/02/22 136/70   10/05/22 (!) 124/92   07/12/22 118/62          (goal 120/80)    Past Medical History:   Diagnosis Date    Arthritis     COPD (chronic obstructive pulmonary disease) (HCC)     Diabetes mellitus (HCC)     GURPREET on CPAP       Past Surgical History:   Procedure Laterality Date    APPENDECTOMY  1952    CHOLECYSTECTOMY, LAPAROSCOPIC  1996    JOINT REPLACEMENT Right 11/07/2017    LUMBAR LAMINECTOMY  10/24/2018    L2-L5 laminectomy, foraminotomy    SPINAL CORD STIMULATOR SURGERY Right 05/2021    TONSILLECTOMY  1959    TOTAL KNEE ARTHROPLASTY Left 02/01/2017       No family history on file.     Social History     Tobacco Use    Smoking status: Every Day     Packs/day: 1.00     Years: 42.00     Pack years: 42.00     Types: Cigarettes     Last attempt to quit: 10/12/2017     Years since quittin.0    Smokeless tobacco: Never   Substance Use Topics    Alcohol use: No     Comment: seldom      Current Outpatient Medications   Medication Sig Dispense Refill    traZODone (DESYREL) 50 MG tablet TAKE 1 TABLET BY MOUTH EVERY DAY AT NIGHT 90 tablet 3    oxyCODONE-acetaminophen (PERCOCET)  MG per tablet Take 1 tablet by mouth every 8 hours as needed for Pain for up to 30 days.  90 tablet 0    celecoxib (CELEBREX) 200 MG capsule TAKE 1 CAPSULE BY MOUTH EVERY DAY 30 capsule 5    gabapentin (NEURONTIN) 300 MG capsule TAKE 1 CAPSULE BY MOUTH FOUR TIMES A  capsule 1    hydroCHLOROthiazide (HYDRODIURIL) 50 MG tablet TAKE 1 TABLET BY MOUTH EVERY DAY 90 tablet 0    atorvastatin (LIPITOR) 20 MG tablet Take 1 tablet by mouth nightly 100 tablet 3    benazepril (LOTENSIN) 20 MG tablet TAKE 1 TABLET BY MOUTH EVERY  tablet 3    metFORMIN (GLUCOPHAGE) 1000 MG tablet Take 1 tablet by mouth daily (with breakfast) 100 tablet 3    fluocinonide (LIDEX) 0.05 % cream apply topically twice a day as needed 60 g 0    hydroCHLOROthiazide (HYDRODIURIL) 25 MG tablet 1 tablet      Multiple Vitamins-Minerals (CENTRUM SILVER PO) Take by mouth      budesonide-formoterol (SYMBICORT) 160-4.5 MCG/ACT AERO Inhale 2 puffs into the lungs 2 times daily 10.2 g 5    furosemide (LASIX) 20 MG tablet Take 1 tablet by mouth 2 times daily 180 tablet 3    vitamin D 25 MCG (1000 UT) CAPS Take 2 capsules by mouth daily 60 capsule 5    albuterol sulfate HFA (PROAIR HFA) 108 (90 Base) MCG/ACT inhaler Inhale 2 puffs into the lungs every 6 hours as needed for Wheezing 8.5 g 5    ipratropium-albuterol (DUONEB) 0.5-2.5 (3) MG/3ML SOLN nebulizer solution Inhale 3 mLs into the lungs every 6 hours as needed for Shortness of Breath 360 mL 1     No current facility-administered medications for this visit. Allergies   Allergen Reactions    Cheratussin Ac [Guaifenesin-Codeine] Other (See Comments)     Coughing Attack. Health Maintenance   Topic Date Due    Diabetic retinal exam  Never done    Shingles vaccine (1 of 2) Never done    COVID-19 Vaccine (4 - Booster for Moderna series) 03/29/2022    Diabetic foot exam  07/28/2022    Flu vaccine (1) 08/01/2022    Low dose CT lung screening  12/29/2022    Diabetic microalbuminuria test  02/01/2023    Depression Screen  02/01/2023    Annual Wellness Visit (AWV)  02/02/2023    Lipids  05/11/2023    A1C test (Diabetic or Prediabetic)  11/02/2023    Colorectal Cancer Screen  11/15/2024    DTaP/Tdap/Td vaccine (3 - Td or Tdap) 07/27/2027    Pneumococcal 65+ years Vaccine  Completed    AAA screen  Completed    Hepatitis C screen  Completed    Hepatitis A vaccine  Aged Out    Hib vaccine  Aged Out    Meningococcal (ACWY) vaccine  Aged Out       Subjective:      Review of Systems   Constitutional:  Negative for chills and fever. HENT:  Negative for rhinorrhea and sore throat. Eyes:  Negative for discharge and redness. Respiratory:  Negative for cough, shortness of breath and wheezing. Cardiovascular:  Negative for chest pain and palpitations. Gastrointestinal:  Negative for abdominal pain, diarrhea, nausea and vomiting. Genitourinary:  Negative for dysuria and frequency. Musculoskeletal:  Negative for arthralgias and myalgias. Neurological:  Negative for dizziness, light-headedness and headaches. Psychiatric/Behavioral:  Negative for sleep disturbance. Objective:     /70   Pulse 84   Ht 5' 8.04\" (1.728 m)   Wt 278 lb 12.8 oz (126.5 kg)   SpO2 94%   BMI 42.34 kg/m²   Physical Exam  Vitals and nursing note reviewed. Constitutional:       General: He is not in acute distress. Appearance: He is well-developed. He is not ill-appearing. HENT:      Head: Normocephalic and atraumatic. Right Ear: External ear normal.      Left Ear: External ear normal.   Eyes:      General: No scleral icterus. Right eye: No discharge. Left eye: No discharge. Conjunctiva/sclera: Conjunctivae normal.   Neck:      Thyroid: No thyromegaly. Trachea: No tracheal deviation. Cardiovascular:      Rate and Rhythm: Normal rate and regular rhythm. Heart sounds: Normal heart sounds. Pulmonary:      Effort: Pulmonary effort is normal. No respiratory distress. Breath sounds: Normal breath sounds. No wheezing. Lymphadenopathy:      Cervical: No cervical adenopathy. Skin:     General: Skin is warm. Findings: No rash. Neurological:      Mental Status: He is alert and oriented to person, place, and time. Psychiatric:         Mood and Affect: Mood normal.         Behavior: Behavior normal.         Thought Content: Thought content normal.       Assessment:       Diagnosis Orders   1. Controlled type 2 diabetes mellitus without complication, without long-term current use of insulin (HCC)  POCT glycosylated hemoglobin (Hb A1C)      2. Need for vaccination  Influenza, FLUAD, (age 72 y+), IM, Preservative Free, 0.5 mL      3. Chronic obstructive pulmonary disease, unspecified COPD type ([address]        4. GURPREET on CPAP        5. Post laminectomy syndrome               Plan:   Blood work reviewed  Encourage patient to try to keep Percocet to a bare minimum. Not interested in seeing pain management or having surgery. Denies any side effects from the Percocet  Continue with CPAP machine for his sleep apnea. Has been beneficial.  Flu shot today  Urine drug screen today    Return in about 4 months (around 3/2/2023). Orders Placed This Encounter   Procedures    Influenza, FLUAD, (age 72 y+), IM, Preservative Free, 0.5 mL    POCT glycosylated hemoglobin (Hb A1C)     No orders of the defined types were placed in this encounter.       Patient given educational materials - see patient

## 2022-11-03 NOTE — TELEPHONE ENCOUNTER
I have tried emailing his letter twice to the email listed and it has failed both times. LVM for patient to call office.

## 2022-11-12 DIAGNOSIS — J44.9 CHRONIC OBSTRUCTIVE PULMONARY DISEASE, UNSPECIFIED COPD TYPE (HCC): ICD-10-CM

## 2022-11-14 RX ORDER — HYDROCHLOROTHIAZIDE 50 MG/1
TABLET ORAL
Qty: 90 TABLET | Refills: 0 | Status: SHIPPED | OUTPATIENT
Start: 2022-11-14

## 2022-11-16 DIAGNOSIS — J44.9 CHRONIC OBSTRUCTIVE PULMONARY DISEASE, UNSPECIFIED COPD TYPE (HCC): ICD-10-CM

## 2022-11-16 RX ORDER — HYDROCHLOROTHIAZIDE 50 MG/1
TABLET ORAL
Qty: 90 TABLET | Refills: 0 | OUTPATIENT
Start: 2022-11-16

## 2022-11-17 DIAGNOSIS — M54.50 ACUTE MIDLINE LOW BACK PAIN WITHOUT SCIATICA: ICD-10-CM

## 2022-11-17 RX ORDER — OXYCODONE AND ACETAMINOPHEN 10; 325 MG/1; MG/1
1 TABLET ORAL EVERY 8 HOURS PRN
Qty: 90 TABLET | Refills: 0 | Status: SHIPPED | OUTPATIENT
Start: 2022-11-17 | End: 2022-12-17

## 2022-11-29 ENCOUNTER — TELEPHONE (OUTPATIENT)
Dept: ONCOLOGY | Age: 72
End: 2022-11-29

## 2022-11-29 DIAGNOSIS — Z87.891 PERSONAL HISTORY OF NICOTINE DEPENDENCE: Primary | ICD-10-CM

## 2022-11-29 NOTE — TELEPHONE ENCOUNTER
Our records indicate that your patient is coming due for their annual lung cancer screening follow up testing. For your convenience, we have pended the order for the scan for you. If you do not agree with the need for the test, please cancel the order and let us know. Sincerely,    [address] [place] [address] Screening Program    Auto printed reminder letter sent to patient.

## 2022-12-05 ENCOUNTER — TELEPHONE (OUTPATIENT)
Dept: PRIMARY CARE CLINIC | Age: 72
End: 2022-12-05

## 2022-12-05 ENCOUNTER — OFFICE VISIT (OUTPATIENT)
Dept: PRIMARY CARE CLINIC | Age: 72
End: 2022-12-05
Payer: MEDICARE

## 2022-12-05 VITALS
WEIGHT: 279.6 LBS | BODY MASS INDEX: 42.37 KG/M2 | SYSTOLIC BLOOD PRESSURE: 126 MMHG | HEIGHT: 68 IN | OXYGEN SATURATION: 94 % | HEART RATE: 98 BPM | DIASTOLIC BLOOD PRESSURE: 88 MMHG

## 2022-12-05 DIAGNOSIS — Z99.89 OSA ON CPAP: ICD-10-CM

## 2022-12-05 DIAGNOSIS — R06.02 SHORTNESS OF BREATH: ICD-10-CM

## 2022-12-05 DIAGNOSIS — G47.33 OSA ON CPAP: ICD-10-CM

## 2022-12-05 DIAGNOSIS — J44.9 CHRONIC OBSTRUCTIVE PULMONARY DISEASE, UNSPECIFIED COPD TYPE (HCC): ICD-10-CM

## 2022-12-05 DIAGNOSIS — E11.9 CONTROLLED TYPE 2 DIABETES MELLITUS WITHOUT COMPLICATION, WITHOUT LONG-TERM CURRENT USE OF INSULIN (HCC): ICD-10-CM

## 2022-12-05 DIAGNOSIS — R60.0 LOWER EXTREMITY EDEMA: ICD-10-CM

## 2022-12-05 DIAGNOSIS — E66.01 OBESITY, MORBID, BMI 40.0-49.9 (HCC): ICD-10-CM

## 2022-12-05 DIAGNOSIS — R60.0 LOWER EXTREMITY EDEMA: Primary | ICD-10-CM

## 2022-12-05 LAB
ABSOLUTE EOS #: 0.23 K/UL (ref 0–0.44)
ABSOLUTE IMMATURE GRANULOCYTE: 0.05 K/UL (ref 0–0.3)
ABSOLUTE LYMPH #: 2.89 K/UL (ref 1.1–3.7)
ABSOLUTE MONO #: 1.32 K/UL (ref 0.1–1.2)
ANION GAP SERPL CALCULATED.3IONS-SCNC: 12 MMOL/L (ref 9–17)
BASOPHILS # BLD: 1 % (ref 0–2)
BASOPHILS ABSOLUTE: 0.06 K/UL (ref 0–0.2)
BUN BLDV-MCNC: 18 MG/DL (ref 8–23)
CALCIUM SERPL-MCNC: 9.3 MG/DL (ref 8.6–10.4)
CHLORIDE BLD-SCNC: 101 MMOL/L (ref 98–107)
CO2: 28 MMOL/L (ref 20–31)
CREAT SERPL-MCNC: 0.92 MG/DL (ref 0.7–1.2)
EOSINOPHILS RELATIVE PERCENT: 2 % (ref 1–4)
GFR SERPL CREATININE-BSD FRML MDRD: >60 ML/MIN/1.73M2
GLUCOSE BLD-MCNC: 113 MG/DL (ref 70–99)
HCT VFR BLD CALC: 50.6 % (ref 40.7–50.3)
HEMOGLOBIN: 16.2 G/DL (ref 13–17)
IMMATURE GRANULOCYTES: 1 %
LYMPHOCYTES # BLD: 27 % (ref 24–43)
MCH RBC QN AUTO: 30.4 PG (ref 25.2–33.5)
MCHC RBC AUTO-ENTMCNC: 32 G/DL (ref 28.4–34.8)
MCV RBC AUTO: 94.9 FL (ref 82.6–102.9)
MONOCYTES # BLD: 12 % (ref 3–12)
NRBC AUTOMATED: 0 PER 100 WBC
PDW BLD-RTO: 13.2 % (ref 11.8–14.4)
PLATELET # BLD: 271 K/UL (ref 138–453)
PMV BLD AUTO: 9.3 FL (ref 8.1–13.5)
POTASSIUM SERPL-SCNC: 4.5 MMOL/L (ref 3.7–5.3)
PRO-BNP: 47 PG/ML
RBC # BLD: 5.33 M/UL (ref 4.21–5.77)
SEG NEUTROPHILS: 57 % (ref 36–65)
SEGMENTED NEUTROPHILS ABSOLUTE COUNT: 6.09 K/UL (ref 1.5–8.1)
SODIUM BLD-SCNC: 141 MMOL/L (ref 135–144)
WBC # BLD: 10.6 K/UL (ref 3.5–11.3)

## 2022-12-05 PROCEDURE — 1123F ACP DISCUSS/DSCN MKR DOCD: CPT | Performed by: PHYSICIAN ASSISTANT

## 2022-12-05 PROCEDURE — 3044F HG A1C LEVEL LT 7.0%: CPT | Performed by: PHYSICIAN ASSISTANT

## 2022-12-05 PROCEDURE — 99214 OFFICE O/P EST MOD 30 MIN: CPT | Performed by: PHYSICIAN ASSISTANT

## 2022-12-05 RX ORDER — CEPHALEXIN 500 MG/1
500 CAPSULE ORAL 2 TIMES DAILY
Qty: 20 CAPSULE | Refills: 0 | Status: SHIPPED | OUTPATIENT
Start: 2022-12-05

## 2022-12-05 RX ORDER — FUROSEMIDE 20 MG/1
20 TABLET ORAL 2 TIMES DAILY
Qty: 60 TABLET | Refills: 0 | Status: SHIPPED | OUTPATIENT
Start: 2022-12-05

## 2022-12-05 RX ORDER — MELOXICAM 15 MG/1
TABLET ORAL
COMMUNITY

## 2022-12-05 ASSESSMENT — ENCOUNTER SYMPTOMS
SHORTNESS OF BREATH: 1
CHEST TIGHTNESS: 0

## 2022-12-05 NOTE — TELEPHONE ENCOUNTER
Patient is asking if he can have a note for work stating that it is okay for him to drive while taking percocet ?     Okay for note

## 2022-12-05 NOTE — PROGRESS NOTES
[facility] PRIMARY CARE  [address] [address]  Dept: [address] [facility] Olivier is a 67 y.o. male Established patient, who presents today for his medical conditions/complaints as noted below. Chief Complaint   Patient presents with    Leg Swelling     Both legs , started a month ago , hard to even get shoes on        HPI:     HPI: The patient has had a month of foot swelling. Both feet. He is not taking lasix at this time has run out. Has SOB but it is unchanged. No fevers. Not painful. Reviewed prior notes None  Reviewed previous Labs    LDL Cholesterol (mg/dL)   Date Value   2022 70   2021 25     LDL Calculated (mg/dL)   Date Value   2018 65       (goal LDL is <100)   AST (U/L)   Date Value   2022 26     ALT (U/L)   Date Value   2022 25     BUN (mg/dL)   Date Value   2022 17     Hemoglobin A1C (%)   Date Value   2022 6.3     TSH (uIU/mL)   Date Value   2022 3.00     BP Readings from Last 3 Encounters:   22 126/88   22 136/70   10/05/22 (!) 124/92          (goal 120/80)  Hemoglobin A1C   Date Value Ref Range Status   2022 6.3 % Final     Past Medical History:   Diagnosis Date    Arthritis     COPD (chronic obstructive pulmonary disease) ([facility] [address]     Diabetes mellitus (HCC)     GURPREET on CPAP       Past Surgical History:   Procedure Laterality Date    APPENDECTOMY      CHOLECYSTECTOMY, LAPAROSCOPIC  1996    JOINT REPLACEMENT Right 2017    LUMBAR LAMINECTOMY  10/24/2018    L2-L5 laminectomy, foraminotomy    SPINAL CORD STIMULATOR SURGERY Right 2021    TONSILLECTOMY      TOTAL KNEE ARTHROPLASTY Left 2017       No family history on file.     Social History     Tobacco Use    Smoking status: Every Day     Packs/day: 1.00     Years: 42.00     Pack years: 42.00     Types: Cigarettes     Last attempt to quit: 10/12/2017     Years since quittin.1    Smokeless tobacco: Never   Substance Use Topics    Alcohol use: No     Comment: seldom      Current Outpatient Medications   Medication Sig Dispense Refill    meloxicam (MOBIC) 15 MG tablet 1 tablet Orally Once a day for 30 days      furosemide (LASIX) 20 MG tablet Take 1 tablet by mouth 2 times daily 60 tablet 0    Elastic Bandages & Supports (MEDICAL COMPRESSION STOCKINGS) MISC 2 each by Does not apply route daily Right and left knee high compression stockings. 20-30 mmHg. To be worn continuously throughout the day. 2 each 11    cephALEXin (KEFLEX) 500 MG capsule Take 1 capsule by mouth 2 times daily 20 capsule 0    oxyCODONE-acetaminophen (PERCOCET)  MG per tablet Take 1 tablet by mouth every 8 hours as needed for Pain for up to 30 days.  90 tablet 0    hydroCHLOROthiazide (HYDRODIURIL) 50 MG tablet TAKE 1 TABLET BY MOUTH EVERY DAY 90 tablet 0    traZODone (DESYREL) 50 MG tablet TAKE 1 TABLET BY MOUTH EVERY DAY AT NIGHT 90 tablet 3    celecoxib (CELEBREX) 200 MG capsule TAKE 1 CAPSULE BY MOUTH EVERY DAY 30 capsule 5    gabapentin (NEURONTIN) 300 MG capsule TAKE 1 CAPSULE BY MOUTH FOUR TIMES A  capsule 1    atorvastatin (LIPITOR) 20 MG tablet Take 1 tablet by mouth nightly 100 tablet 3    benazepril (LOTENSIN) 20 MG tablet TAKE 1 TABLET BY MOUTH EVERY  tablet 3    metFORMIN (GLUCOPHAGE) 1000 MG tablet Take 1 tablet by mouth daily (with breakfast) 100 tablet 3    fluocinonide (LIDEX) 0.05 % cream apply topically twice a day as needed 60 g 0    hydroCHLOROthiazide (HYDRODIURIL) 25 MG tablet 1 tablet      Multiple Vitamins-Minerals (CENTRUM SILVER PO) Take by mouth      budesonide-formoterol (SYMBICORT) 160-4.5 MCG/ACT AERO Inhale 2 puffs into the lungs 2 times daily 10.2 g 5    vitamin D 25 MCG (1000 UT) CAPS Take 2 capsules by mouth daily 60 capsule 5    albuterol sulfate HFA (PROAIR HFA) 108 (90 Base) MCG/ACT inhaler Inhale 2 puffs into the lungs every 6 hours as needed for Wheezing 8.5 g 5     No current facility-administered medications for this visit. Allergies   Allergen Reactions    Cheratussin Ac [Guaifenesin-Codeine] Other (See Comments)     Coughing Attack. Health Maintenance   Topic Date Due    Diabetic retinal exam  Never done    Shingles vaccine (1 of 2) Never done    COVID-19 Vaccine (4 - Booster for Moderna series) 03/29/2022    Diabetic foot exam  07/28/2022    Low dose CT lung screening  12/29/2022    Diabetic microalbuminuria test  02/01/2023    Depression Screen  02/01/2023    Annual Wellness Visit (AWV)  02/02/2023    Lipids  05/11/2023    A1C test (Diabetic or Prediabetic)  11/02/2023    Colorectal Cancer Screen  11/15/2024    DTaP/Tdap/Td vaccine (3 - Td or Tdap) 07/27/2027    Flu vaccine  Completed    Pneumococcal 65+ years Vaccine  Completed    AAA screen  Completed    Hepatitis C screen  Completed    Hepatitis A vaccine  Aged Out    Hib vaccine  Aged Out    Meningococcal (ACWY) vaccine  Aged Out       Subjective:      Review of Systems   Constitutional:  Negative for chills and fever. Respiratory:  Positive for shortness of breath (chronic not with laying flat. ). Negative for chest tightness. Cardiovascular:  Positive for leg swelling. Negative for chest pain and palpitations. Musculoskeletal:  Negative for arthralgias and joint swelling. Skin:  Negative for rash. Objective:     /88   Pulse 98   Ht 5' 8.04\" (1.728 m)   Wt 279 lb 9.6 oz (126.8 kg)   SpO2 94%   BMI 42.46 kg/m²   Physical Exam  Constitutional:       General: He is not in acute distress. Appearance: Normal appearance. He is not ill-appearing. HENT:      Head: Normocephalic and atraumatic. Right Ear: External ear normal.      Left Ear: External ear normal.      Nose: Nose normal.      Mouth/Throat:      Mouth: Mucous membranes are moist.   Neck:      Vascular: No carotid bruit. Cardiovascular:      Rate and Rhythm: Normal rate and regular rhythm. Pulses: Normal pulses. Heart sounds: Normal heart sounds. Pulmonary:      Effort: Pulmonary effort is normal. No respiratory distress. Breath sounds: Normal breath sounds. Musculoskeletal:         General: Normal range of motion. Cervical back: Normal range of motion and neck supple. Lymphadenopathy:      Cervical: No cervical adenopathy. Skin:     General: Skin is warm and dry. Neurological:      General: No focal deficit present. Mental Status: He is alert and oriented to person, place, and time. Psychiatric:         Mood and Affect: Mood normal.         Behavior: Behavior normal.         Thought Content: Thought content normal.   Media Information  Document Information    Dermatology Image:  Dermatology Photo   Right leg swelling    12/05/2022 10:22   Attached To: Office Visit on 12/5/22 with Judd Mcclure, [place]  [facility]   Media Information  Document Information    Dermatology Image:  Dermatology Photo   Leg swelling    12/05/2022 10:21   Attached To: Office Visit on 12/5/22 with Judd Mcclure, [employer]  [employer]       Assessment and Plan:          1. Lower extremity edema  -     Brain Natriuretic Peptide; Future  -     furosemide (LASIX) 20 MG tablet; Take 1 tablet by mouth 2 times daily, Disp-60 tablet, R-0Normal  -     Basic Metabolic Panel; Future  -     CBC with Auto Differential; Future  -     Elastic Bandages & Supports (MEDICAL COMPRESSION STOCKINGS) MISC; DAILY Starting Mon 12/5/2022, Disp-2 each, R-11, PrintRight and left knee high compression stockings. 20-30 mmHg. To be worn continuously throughout the day. -     cephALEXin (KEFLEX) 500 MG capsule; Take 1 capsule by mouth 2 times daily, Disp-20 capsule, R-0Normal  2. Obesity, morbid, BMI 40.0-49.9 ([address]  3. Chronic obstructive pulmonary disease, unspecified COPD type ([address]  -     Brain Natriuretic Peptide;  Future  -     furosemide (LASIX) 20 MG tablet; Take 1 tablet by mouth 2 times daily, Disp-60 tablet, R-0Normal  -     Basic Metabolic Panel; Future  -     CBC with Auto Differential; Future  -     Elastic Bandages & Supports (MEDICAL COMPRESSION STOCKINGS) MISC; DAILY Starting Mon 12/5/2022, Disp-2 each, R-11, PrintRight and left knee high compression stockings. 20-30 mmHg. To be worn continuously throughout the day. 4. Controlled type 2 diabetes mellitus without complication, without long-term current use of insulin (HCC)  -     Brain Natriuretic Peptide; Future  -     Basic Metabolic Panel; Future  -     CBC with Auto Differential; Future  -     Elastic Bandages & Supports (MEDICAL COMPRESSION STOCKINGS) MISC; DAILY Starting Mon 12/5/2022, Disp-2 each, R-11, PrintRight and left knee high compression stockings. 20-30 mmHg. To be worn continuously throughout the day. 5. GURPREET on CPAP  6. Shortness of breath   -     Brain Natriuretic Peptide; Future     Patient educated to start back on his Lasix twice daily. Blood work ordered to check electrolytes and BNP. Patient educated to use compression stockings 12 hours on during the day when his legs are down. Patient educated to elevate legs above heart level for about 15 minutes every couple of hours throughout the day. Patient started on antibiotic due to discoloration on the leg and beginning of seeping fluid. Leg was warm and slightly tender. Patient educated to watch weights and begin checking sugars. Patient would like note from Dr. Bertin Jefferson that since he is still able to drive despite being on Percocet. Return in about 4 weeks (around 1/2/2023) for With Dr. Bertin Jefferson recheck leg swelling. .     Patient given educational materials - see patient instructions. Discussed use, benefit, and side effects of prescribed medications. All patient questions answered. Pt voiced understanding. Reviewed health maintenance. Instructed to continue current medications, diet and exercise. Patient agreed with treatment plan. Follow up as directed.      Electronically signed by JEREMY Mondragon on 12/5/2022 at 10:31 AM

## 2022-12-16 DIAGNOSIS — M54.50 ACUTE MIDLINE LOW BACK PAIN WITHOUT SCIATICA: ICD-10-CM

## 2022-12-16 RX ORDER — OXYCODONE AND ACETAMINOPHEN 10; 325 MG/1; MG/1
1 TABLET ORAL EVERY 8 HOURS PRN
Qty: 90 TABLET | Refills: 0 | Status: SHIPPED | OUTPATIENT
Start: 2022-12-16 | End: 2023-01-15

## 2023-01-03 ENCOUNTER — OFFICE VISIT (OUTPATIENT)
Dept: PRIMARY CARE CLINIC | Age: 73
End: 2023-01-03

## 2023-01-03 VITALS
OXYGEN SATURATION: 93 % | SYSTOLIC BLOOD PRESSURE: 130 MMHG | WEIGHT: 287.6 LBS | HEART RATE: 91 BPM | DIASTOLIC BLOOD PRESSURE: 68 MMHG | BODY MASS INDEX: 43.68 KG/M2

## 2023-01-03 DIAGNOSIS — J44.9 CHRONIC OBSTRUCTIVE PULMONARY DISEASE, UNSPECIFIED COPD TYPE (HCC): ICD-10-CM

## 2023-01-03 DIAGNOSIS — E11.9 CONTROLLED TYPE 2 DIABETES MELLITUS WITHOUT COMPLICATION, WITHOUT LONG-TERM CURRENT USE OF INSULIN (HCC): Primary | ICD-10-CM

## 2023-01-03 DIAGNOSIS — E08.65 DIABETES MELLITUS DUE TO UNDERLYING CONDITION WITH HYPERGLYCEMIA, WITHOUT LONG-TERM CURRENT USE OF INSULIN (HCC): ICD-10-CM

## 2023-01-03 ASSESSMENT — ENCOUNTER SYMPTOMS
DIARRHEA: 0
RHINORRHEA: 0
ABDOMINAL PAIN: 0
NAUSEA: 0
VOMITING: 0
EYE REDNESS: 0
EYE DISCHARGE: 0
WHEEZING: 0
COUGH: 0
SHORTNESS OF BREATH: 1
SORE THROAT: 0

## 2023-01-03 ASSESSMENT — PATIENT HEALTH QUESTIONNAIRE - PHQ9
2. FEELING DOWN, DEPRESSED OR HOPELESS: 0
1. LITTLE INTEREST OR PLEASURE IN DOING THINGS: 0
SUM OF ALL RESPONSES TO PHQ QUESTIONS 1-9: 0
SUM OF ALL RESPONSES TO PHQ9 QUESTIONS 1 & 2: 0
SUM OF ALL RESPONSES TO PHQ QUESTIONS 1-9: 0

## 2023-01-03 NOTE — PROGRESS NOTES
[facility] PRIMARY CARE  [address] [address]  Dept: 700 [facility] Olivier is a 67 y.o. male Established patient, who presents today for his medical conditions/complaints as noted below. Chief Complaint   Patient presents with    Edema     Bilateral lower extremity edema     Diabetes     Patient doesn't check blood sugar at home    COPD       HPI:     HPI  Pt not checking blood sugars. No chest pain. Still with sob, still smoking. No nausea or vomiting. No light headed. C/o body aches everywhere. Reviewed prior notes None  Reviewed previous Labs    LDL Cholesterol (mg/dL)   Date Value   2022 70   2021 25     LDL Calculated (mg/dL)   Date Value   2018 65       (goal LDL is <100)   AST (U/L)   Date Value   2022 26     ALT (U/L)   Date Value   2022 25     BUN (mg/dL)   Date Value   2022 18     Hemoglobin A1C (%)   Date Value   2022 6.3     TSH (uIU/mL)   Date Value   2022 3.00     BP Readings from Last 3 Encounters:   23 130/68   22 126/88   22 136/70          (goal 120/80)    Past Medical History:   Diagnosis Date    Arthritis     COPD (chronic obstructive pulmonary disease) ([facility] [address]     Diabetes mellitus (HCC)     GURPREET on CPAP       Past Surgical History:   Procedure Laterality Date    APPENDECTOMY      CHOLECYSTECTOMY, LAPAROSCOPIC  1996    JOINT REPLACEMENT Right 2017    LUMBAR LAMINECTOMY  10/24/2018    L2-L5 laminectomy, foraminotomy    SPINAL CORD STIMULATOR SURGERY Right 2021    TONSILLECTOMY      TOTAL KNEE ARTHROPLASTY Left 2017       No family history on file. Social History     Tobacco Use    Smoking status: Every Day     Packs/day: 1.00     Years: 42.00     Pack years: 42.00     Types: Cigarettes     Last attempt to quit: 10/12/2017     Years since quittin.2    Smokeless tobacco: Never   Substance Use Topics    Alcohol use:  No Comment: seldom      Current Outpatient Medications   Medication Sig Dispense Refill    oxyCODONE-acetaminophen (PERCOCET)  MG per tablet Take 1 tablet by mouth every 8 hours as needed for Pain for up to 30 days. 90 tablet 0    meloxicam (MOBIC) 15 MG tablet 1 tablet Orally Once a day for 30 days      furosemide (LASIX) 20 MG tablet Take 1 tablet by mouth 2 times daily 60 tablet 0    Elastic Bandages & Supports (MEDICAL COMPRESSION STOCKINGS) MISC 2 each by Does not apply route daily Right and left knee high compression stockings. 20-30 mmHg. To be worn continuously throughout the day.  2 each 11    hydroCHLOROthiazide (HYDRODIURIL) 50 MG tablet TAKE 1 TABLET BY MOUTH EVERY DAY 90 tablet 0    traZODone (DESYREL) 50 MG tablet TAKE 1 TABLET BY MOUTH EVERY DAY AT NIGHT 90 tablet 3    celecoxib (CELEBREX) 200 MG capsule TAKE 1 CAPSULE BY MOUTH EVERY DAY 30 capsule 5    gabapentin (NEURONTIN) 300 MG capsule TAKE 1 CAPSULE BY MOUTH FOUR TIMES A  capsule 1    atorvastatin (LIPITOR) 20 MG tablet Take 1 tablet by mouth nightly 100 tablet 3    benazepril (LOTENSIN) 20 MG tablet TAKE 1 TABLET BY MOUTH EVERY  tablet 3    metFORMIN (GLUCOPHAGE) 1000 MG tablet Take 1 tablet by mouth daily (with breakfast) 100 tablet 3    fluocinonide (LIDEX) 0.05 % cream apply topically twice a day as needed 60 g 0    hydroCHLOROthiazide (HYDRODIURIL) 25 MG tablet 1 tablet      Multiple Vitamins-Minerals (CENTRUM SILVER PO) Take by mouth      budesonide-formoterol (SYMBICORT) 160-4.5 MCG/ACT AERO Inhale 2 puffs into the lungs 2 times daily 10.2 g 5    vitamin D 25 MCG (1000 UT) CAPS Take 2 capsules by mouth daily 60 capsule 5    albuterol sulfate HFA (PROAIR HFA) 108 (90 Base) MCG/ACT inhaler Inhale 2 puffs into the lungs every 6 hours as needed for Wheezing 8.5 g 5    cephALEXin (KEFLEX) 500 MG capsule Take 1 capsule by mouth 2 times daily (Patient not taking: Reported on 1/3/2023) 20 capsule 0     No current facility-administered medications for this visit. Allergies   Allergen Reactions    Cheratussin Ac [Guaifenesin-Codeine] Other (See Comments)     Coughing Attack. Health Maintenance   Topic Date Due    Diabetic retinal exam  Never done    Shingles vaccine (1 of 2) Never done    COVID-19 Vaccine (4 - Booster for Moderna series) 03/29/2022    Diabetic foot exam  07/28/2022    Low dose CT lung screening  12/29/2022    Diabetic Alb to Cr ratio (uACR) test  02/01/2023    Depression Screen  02/01/2023    Annual Wellness Visit (AWV)  02/02/2023    Lipids  05/11/2023    A1C test (Diabetic or Prediabetic)  11/02/2023    GFR test (Diabetes, CKD 3-4, OR last GFR 15-59)  12/05/2023    Colorectal Cancer Screen  11/15/2024    DTaP/Tdap/Td vaccine (3 - Td or Tdap) 07/27/2027    Flu vaccine  Completed    Pneumococcal 65+ years Vaccine  Completed    AAA screen  Completed    Hepatitis C screen  Completed    Hepatitis A vaccine  Aged Out    Hib vaccine  Aged Out    Meningococcal (ACWY) vaccine  Aged Out       Subjective:      Review of Systems   Constitutional:  Negative for chills and fever. HENT:  Negative for rhinorrhea and sore throat. Eyes:  Negative for discharge and redness. Respiratory:  Positive for shortness of breath. Negative for cough and wheezing. Cardiovascular:  Negative for chest pain and palpitations. Gastrointestinal:  Negative for abdominal pain, diarrhea, nausea and vomiting. Genitourinary:  Negative for dysuria and frequency. Musculoskeletal:  Negative for arthralgias and myalgias. Neurological:  Negative for dizziness, light-headedness and headaches. Psychiatric/Behavioral:  Negative for sleep disturbance. Objective:     /68   Pulse 91   Wt 287 lb 9.6 oz (130.5 kg)   SpO2 93%   BMI 43.68 kg/m²   Physical Exam  Vitals and nursing note reviewed. Constitutional:       General: He is not in acute distress. Appearance: He is well-developed. He is not ill-appearing. HENT:      Head: Normocephalic and atraumatic. Right Ear: External ear normal.      Left Ear: External ear normal.   Eyes:      General: No scleral icterus. Right eye: No discharge. Left eye: No discharge. Conjunctiva/sclera: Conjunctivae normal.   Neck:      Thyroid: No thyromegaly. Trachea: No tracheal deviation. Cardiovascular:      Rate and Rhythm: Normal rate and regular rhythm. Heart sounds: Normal heart sounds. Pulmonary:      Effort: Pulmonary effort is normal. No respiratory distress. Breath sounds: Normal breath sounds. No wheezing. Lymphadenopathy:      Cervical: No cervical adenopathy. Skin:     General: Skin is warm. Findings: No rash. Neurological:      Mental Status: He is alert and oriented to person, place, and time. Psychiatric:         Mood and Affect: Mood normal.         Behavior: Behavior normal.         Thought Content: Thought content normal.       Assessment:       Diagnosis Orders   1. Controlled type 2 diabetes mellitus without complication, without long-term current use of insulin ([address]        2. Diabetes mellitus due to underlying condition with hyperglycemia, without long-term current use of insulin ([address]        3. Chronic obstructive pulmonary disease, unspecified COPD type ([address]               Plan:    Reprint order for ct chest  Encouraged to quit smoking  Continue diabetic medication as is     Return in about 3 months (around 4/3/2023), or medicare wellness. No orders of the defined types were placed in this encounter. No orders of the defined types were placed in this encounter. Patient given educational materials - see patient instructions. Discussed use, benefit, and side effects of prescribed medications. All patient questions answered. Pt voiced understanding. Reviewed health maintenance. Instructed to continue current medications, diet andexercise. Patient agreed with treatment plan. Follow up as directed. Electronicallysigned by Sony Edmondson MD on 1/3/2023 at 10:37 AM

## 2023-01-13 DIAGNOSIS — M54.50 ACUTE MIDLINE LOW BACK PAIN WITHOUT SCIATICA: ICD-10-CM

## 2023-01-16 RX ORDER — OXYCODONE AND ACETAMINOPHEN 10; 325 MG/1; MG/1
1 TABLET ORAL EVERY 8 HOURS PRN
Qty: 90 TABLET | Refills: 0 | Status: SHIPPED | OUTPATIENT
Start: 2023-01-16 | End: 2023-02-15

## 2023-02-07 ENCOUNTER — OFFICE VISIT (OUTPATIENT)
Dept: PRIMARY CARE CLINIC | Age: 73
End: 2023-02-07
Payer: MEDICARE

## 2023-02-07 VITALS
OXYGEN SATURATION: 94 % | HEART RATE: 82 BPM | BODY MASS INDEX: 42.56 KG/M2 | DIASTOLIC BLOOD PRESSURE: 80 MMHG | SYSTOLIC BLOOD PRESSURE: 128 MMHG | WEIGHT: 280.8 LBS | HEIGHT: 68 IN

## 2023-02-07 DIAGNOSIS — J44.9 CHRONIC OBSTRUCTIVE PULMONARY DISEASE, UNSPECIFIED COPD TYPE (HCC): ICD-10-CM

## 2023-02-07 DIAGNOSIS — R60.0 LOWER EXTREMITY EDEMA: ICD-10-CM

## 2023-02-07 DIAGNOSIS — E66.01 OBESITY, CLASS III, BMI 40-49.9 (MORBID OBESITY) (HCC): ICD-10-CM

## 2023-02-07 DIAGNOSIS — E11.9 CONTROLLED TYPE 2 DIABETES MELLITUS WITHOUT COMPLICATION, WITHOUT LONG-TERM CURRENT USE OF INSULIN (HCC): Primary | ICD-10-CM

## 2023-02-07 LAB
CREATININE URINE POCT: 200
HBA1C MFR BLD: 6.2 %
MICROALBUMIN/CREAT 24H UR: 30 MG/G{CREAT}
MICROALBUMIN/CREAT UR-RTO: <30

## 2023-02-07 PROCEDURE — 3044F HG A1C LEVEL LT 7.0%: CPT | Performed by: FAMILY MEDICINE

## 2023-02-07 PROCEDURE — 99214 OFFICE O/P EST MOD 30 MIN: CPT | Performed by: FAMILY MEDICINE

## 2023-02-07 PROCEDURE — 1123F ACP DISCUSS/DSCN MKR DOCD: CPT | Performed by: FAMILY MEDICINE

## 2023-02-07 PROCEDURE — 83036 HEMOGLOBIN GLYCOSYLATED A1C: CPT | Performed by: FAMILY MEDICINE

## 2023-02-07 PROCEDURE — 82044 UR ALBUMIN SEMIQUANTITATIVE: CPT | Performed by: FAMILY MEDICINE

## 2023-02-07 RX ORDER — DIPHENHYDRAMINE HCL 25 MG
25 TABLET ORAL EVERY 6 HOURS PRN
COMMUNITY

## 2023-02-07 RX ORDER — VARENICLINE TARTRATE 1 MG/1
1 TABLET, FILM COATED ORAL 2 TIMES DAILY
Qty: 60 TABLET | Refills: 3 | Status: SHIPPED | OUTPATIENT
Start: 2023-02-07

## 2023-02-07 RX ORDER — FUROSEMIDE 40 MG/1
40 TABLET ORAL DAILY
Qty: 90 TABLET | Refills: 3 | Status: SHIPPED | OUTPATIENT
Start: 2023-02-07

## 2023-02-07 RX ORDER — VARENICLINE TARTRATE
KIT
Qty: 53 EACH | Refills: 0 | Status: SHIPPED | OUTPATIENT
Start: 2023-02-07

## 2023-02-07 SDOH — ECONOMIC STABILITY: FOOD INSECURITY: WITHIN THE PAST 12 MONTHS, YOU WORRIED THAT YOUR FOOD WOULD RUN OUT BEFORE YOU GOT MONEY TO BUY MORE.: NEVER TRUE

## 2023-02-07 SDOH — ECONOMIC STABILITY: FOOD INSECURITY: WITHIN THE PAST 12 MONTHS, THE FOOD YOU BOUGHT JUST DIDN'T LAST AND YOU DIDN'T HAVE MONEY TO GET MORE.: NEVER TRUE

## 2023-02-07 SDOH — ECONOMIC STABILITY: HOUSING INSECURITY
IN THE LAST 12 MONTHS, WAS THERE A TIME WHEN YOU DID NOT HAVE A STEADY PLACE TO SLEEP OR SLEPT IN A SHELTER (INCLUDING NOW)?: NO

## 2023-02-07 SDOH — ECONOMIC STABILITY: INCOME INSECURITY: HOW HARD IS IT FOR YOU TO PAY FOR THE VERY BASICS LIKE FOOD, HOUSING, MEDICAL CARE, AND HEATING?: NOT HARD AT ALL

## 2023-02-07 ASSESSMENT — ENCOUNTER SYMPTOMS
VOMITING: 0
EYE DISCHARGE: 0
NAUSEA: 0
DIARRHEA: 0
COUGH: 0
SHORTNESS OF BREATH: 1
RHINORRHEA: 0
EYE REDNESS: 0
WHEEZING: 0
SORE THROAT: 0
ABDOMINAL PAIN: 0

## 2023-02-07 NOTE — PROGRESS NOTES
[facility] PRIMARY CARE  [address] [address]  Dept: 700 [facility] - Gillette Olivier is a 67 y.o. male Established patient, who presents today for his medical conditions/complaints as noted below. Chief Complaint   Patient presents with    Edema    Leg Swelling           Foot Swelling                HPI:     HPI  Patient here for diabetic recheck. States not checking her sugars. Denies any low blood sugar reactions. Hemoglobin A1c 6.2 today  Patient continues to complain of swelling in the feet. States often not getting a second dose of Lasix and at the 20 mg twice daily. Patient states still smoking. Requesting help with smoking cessation. Requesting a prescription for Chantix  Denies any active chest heaviness. No melena or hematochezia. Reviewed prior notes None  Reviewed previous Labs    LDL Cholesterol (mg/dL)   Date Value   05/11/2022 70   01/11/2021 25     LDL Calculated (mg/dL)   Date Value   01/12/2018 65       (goal LDL is <100)   AST (U/L)   Date Value   05/11/2022 26     ALT (U/L)   Date Value   05/11/2022 25     BUN (mg/dL)   Date Value   12/05/2022 18     Hemoglobin A1C (%)   Date Value   02/07/2023 6.2     TSH (uIU/mL)   Date Value   05/11/2022 3.00     BP Readings from Last 3 Encounters:   02/07/23 128/80   01/03/23 130/68   12/05/22 126/88          (goal 120/80)    Past Medical History:   Diagnosis Date    Arthritis     COPD (chronic obstructive pulmonary disease) (HCC)     Diabetes mellitus (HCC)     GURPREET on CPAP       Past Surgical History:   Procedure Laterality Date    APPENDECTOMY  1952    CHOLECYSTECTOMY, LAPAROSCOPIC  1996    JOINT REPLACEMENT Right 11/07/2017    LUMBAR LAMINECTOMY  10/24/2018    L2-L5 laminectomy, foraminotomy    SPINAL CORD STIMULATOR SURGERY Right 05/2021    TONSILLECTOMY  1959    TOTAL KNEE ARTHROPLASTY Left 02/01/2017       No family history on file.     Social History     Tobacco Use    Smoking status: Every Day     Packs/day: 1.00     Years: 42.00     Pack years: 42.00     Types: Cigarettes     Last attempt to quit: 10/12/2017     Years since quittin.3    Smokeless tobacco: Never   Substance Use Topics    Alcohol use: No     Comment: seldom      Current Outpatient Medications   Medication Sig Dispense Refill    diphenhydrAMINE (BENADRYL) 25 MG tablet Take 25 mg by mouth every 6 hours as needed for Itching      furosemide (LASIX) 40 MG tablet Take 1 tablet by mouth daily 90 tablet 3    Varenicline Tartrate, Starter, (CHANTIX STARTING MONTH ) 0.5 MG X 11 & 1 MG X 42 TBPK Per package insert 53 each 0    varenicline (CHANTIX CONTINUING MONTH BOOKER) 1 MG tablet Take 1 tablet by mouth 2 times daily 60 tablet 3    oxyCODONE-acetaminophen (PERCOCET)  MG per tablet Take 1 tablet by mouth every 8 hours as needed for Pain for up to 30 days. 90 tablet 0    meloxicam (MOBIC) 15 MG tablet 1 tablet Orally Once a day for 30 days      Elastic Bandages & Supports ([facility]) [address] [facility] 2 each by Does not apply route daily Right and left knee high compression stockings. 20-30 mmHg. To be worn continuously throughout the day.  2 each 11    traZODone (DESYREL) 50 MG tablet TAKE 1 TABLET BY MOUTH EVERY DAY AT NIGHT 90 tablet 3    celecoxib (CELEBREX) 200 MG capsule TAKE 1 CAPSULE BY MOUTH EVERY DAY 30 capsule 5    gabapentin (NEURONTIN) 300 MG capsule TAKE 1 CAPSULE BY MOUTH FOUR TIMES A  capsule 1    atorvastatin (LIPITOR) 20 MG tablet Take 1 tablet by mouth nightly 100 tablet 3    benazepril (LOTENSIN) 20 MG tablet TAKE 1 TABLET BY MOUTH EVERY  tablet 3    metFORMIN (GLUCOPHAGE) 1000 MG tablet Take 1 tablet by mouth daily (with breakfast) 100 tablet 3    fluocinonide (LIDEX) 0.05 % cream apply topically twice a day as needed 60 g 0    hydroCHLOROthiazide (HYDRODIURIL) 25 MG tablet 1 tablet      Multiple Vitamins-Minerals (CENTRUM SILVER PO) Take by mouth      budesonide-formoterol (SYMBICORT) 160-4.5 MCG/ACT AERO Inhale 2 puffs into the lungs 2 times daily 10.2 g 5    vitamin D 25 MCG (1000 UT) CAPS Take 2 capsules by mouth daily 60 capsule 5    albuterol sulfate HFA (PROAIR HFA) 108 (90 Base) MCG/ACT inhaler Inhale 2 puffs into the lungs every 6 hours as needed for Wheezing 8.5 g 5    cephALEXin (KEFLEX) 500 MG capsule Take 1 capsule by mouth 2 times daily (Patient not taking: No sig reported) 20 capsule 0     No current facility-administered medications for this visit. Allergies   Allergen Reactions    Cheratussin Ac [Guaifenesin-Codeine] Other (See Comments)     Coughing Attack. Health Maintenance   Topic Date Due    Diabetic retinal exam  Never done    Shingles vaccine (1 of 2) Never done    COVID-19 Vaccine (4 - Booster for Moderna series) 03/29/2022    Low dose CT lung screening  12/29/2022    Diabetic Alb to Cr ratio (uACR) test  02/01/2023    Annual Wellness Visit (AWV)  02/02/2023    Lipids  05/11/2023    GFR test (Diabetes, CKD 3-4, OR last GFR 15-59)  12/05/2023    Depression Screen  01/03/2024    Diabetic foot exam  02/07/2024    A1C test (Diabetic or Prediabetic)  02/07/2024    Colorectal Cancer Screen  11/15/2024    DTaP/Tdap/Td vaccine (3 - Td or Tdap) 07/27/2027    Flu vaccine  Completed    Pneumococcal 65+ years Vaccine  Completed    AAA screen  Completed    Hepatitis C screen  Completed    Hepatitis A vaccine  Aged Out    Hib vaccine  Aged Out    Meningococcal (ACWY) vaccine  Aged Out       Subjective:      Review of Systems   Constitutional:  Negative for chills and fever. HENT:  Negative for rhinorrhea and sore throat. Eyes:  Negative for discharge and redness. Respiratory:  Positive for shortness of breath. Negative for cough and wheezing. Cardiovascular:  Negative for chest pain and palpitations. Gastrointestinal:  Negative for abdominal pain, diarrhea, nausea and vomiting. Genitourinary:  Negative for dysuria and frequency.    Musculoskeletal: Negative for arthralgias and myalgias. Neurological:  Negative for dizziness, light-headedness and headaches. Psychiatric/Behavioral:  Negative for sleep disturbance. Objective:     /80   Pulse 82   Ht 5' 8.04\" (1.728 m)   Wt 280 lb 12.8 oz (127.4 kg)   SpO2 94%   BMI 42.65 kg/m²   Physical Exam  Vitals and nursing note reviewed. Constitutional:       General: He is not in acute distress. Appearance: He is well-developed. He is not ill-appearing. HENT:      Head: Normocephalic and atraumatic. Right Ear: External ear normal.      Left Ear: External ear normal.   Eyes:      General: No scleral icterus. Right eye: No discharge. Left eye: No discharge. Conjunctiva/sclera: Conjunctivae normal.      Pupils: Pupils are equal, round, and reactive to light. Neck:      Thyroid: No thyromegaly. Trachea: No tracheal deviation. Cardiovascular:      Rate and Rhythm: Normal rate and regular rhythm. Heart sounds: Normal heart sounds. Comments: No carotid bruits  Pulmonary:      Effort: Pulmonary effort is normal. No respiratory distress. Breath sounds: Normal breath sounds. No wheezing. Abdominal:      General: Abdomen is flat. Palpations: Abdomen is soft. Musculoskeletal:      Right lower leg: Edema present. Left lower leg: Edema present. Comments: 2+ pitting edema both lower extremities   Lymphadenopathy:      Cervical: No cervical adenopathy. Skin:     General: Skin is warm and dry. Findings: No rash. Neurological:      Mental Status: He is alert and oriented to person, place, and time. Comments: Diabetic foot check: Bunions: none . Hammertoes none. Plantar calluses mild. No cyanosis or clubbing. sensation intact on 6 of 6 test points with the 10 gram filament. Dorsalis pedis pulses intact bilaterally. Capillary refill at the toes was less than 2 seconds. No skin breakdown, erythema, blisters, scaling, or ulcers.   No evidence of fungal infection. Psychiatric:         Mood and Affect: Mood normal.         Behavior: Behavior normal.         Thought Content: Thought content normal.       Assessment:       Diagnosis Orders   1. Controlled type 2 diabetes mellitus without complication, without long-term current use of insulin ([facility])  POCT glycosylated hemoglobin (Hb A1C)     DIABETES FOOT EXAM    POCT microalbumin      2. Obesity, Class III, BMI 40-49.9 (morbid obesity) ([address]        3. Lower extremity edema  furosemide (LASIX) 40 MG tablet      4. Chronic obstructive pulmonary disease, unspecified COPD type (HCC)  furosemide (LASIX) 40 MG tablet             Plan:    Urine microalbumin  Chantix for smoking  Change Lasix to 40 mg daily  Patient education Shingrix  Patient encouraged to take his water pill daily  Patient encouraged to use his vascular stockings for his edema  Weight loss was discussed    Return in about 3 months (around 5/7/2023). Orders Placed This Encounter   Procedures    POCT glycosylated hemoglobin (Hb A1C)    POCT microalbumin     DIABETES FOOT EXAM     Orders Placed This Encounter   Medications    furosemide (LASIX) 40 MG tablet     Sig: Take 1 tablet by mouth daily     Dispense:  90 tablet     Refill:  3    Varenicline Tartrate, Starter, (CHANTIX STARTING MONTH BOOKER) 0.5 MG X 11 & 1 MG X 42 TBPK     Sig: Per package insert     Dispense:  53 each     Refill:  0    varenicline (CHANTIX CONTINUING MONTH BOOKER) 1 MG tablet     Sig: Take 1 tablet by mouth 2 times daily     Dispense:  60 tablet     Refill:  3       Patient given educational materials - see patient instructions. Discussed use, benefit, and side effects of prescribed medications. All patient questions answered. Pt voiced understanding. Reviewed health maintenance. Instructed to continue current medications, diet andexercise. Patient agreed with treatment plan. Follow up as directed.      Electronicallysigned by Deandre Schumacher MD on 2/7/2023 at 10:51 AM

## 2023-02-17 DIAGNOSIS — M54.50 ACUTE MIDLINE LOW BACK PAIN WITHOUT SCIATICA: ICD-10-CM

## 2023-02-18 ENCOUNTER — HOSPITAL ENCOUNTER (EMERGENCY)
Age: 73
Discharge: HOME | End: 2023-02-18
Attending: EMERGENCY MEDICINE
Payer: MEDICARE

## 2023-02-18 ENCOUNTER — TELEPHONE (OUTPATIENT)
Dept: PRIMARY CARE CLINIC | Age: 73
End: 2023-02-18

## 2023-02-18 VITALS
TEMPERATURE: 98.6 F | OXYGEN SATURATION: 94 % | HEART RATE: 82 BPM | BODY MASS INDEX: 42.38 KG/M2 | HEIGHT: 67 IN | SYSTOLIC BLOOD PRESSURE: 152 MMHG | RESPIRATION RATE: 28 BRPM | DIASTOLIC BLOOD PRESSURE: 99 MMHG | WEIGHT: 270 LBS

## 2023-02-18 DIAGNOSIS — S29.019A THORACIC MYOFASCIAL STRAIN, INITIAL ENCOUNTER: Primary | ICD-10-CM

## 2023-02-18 PROCEDURE — 96372 THER/PROPH/DIAG INJ SC/IM: CPT

## 2023-02-18 PROCEDURE — 99284 EMERGENCY DEPT VISIT MOD MDM: CPT

## 2023-02-18 PROCEDURE — 6360000002 HC RX W HCPCS: Performed by: EMERGENCY MEDICINE

## 2023-02-18 RX ORDER — FENTANYL CITRATE 50 UG/ML
100 INJECTION, SOLUTION INTRAMUSCULAR; INTRAVENOUS ONCE
Status: COMPLETED | OUTPATIENT
Start: 2023-02-18 | End: 2023-02-18

## 2023-02-18 RX ORDER — LIDOCAINE 50 MG/G
1 PATCH TOPICAL DAILY
Qty: 30 PATCH | Refills: 0 | Status: SHIPPED | OUTPATIENT
Start: 2023-02-18 | End: 2023-03-20

## 2023-02-18 RX ADMIN — FENTANYL CITRATE 100 MCG: 50 INJECTION, SOLUTION INTRAMUSCULAR; INTRAVENOUS at 11:35

## 2023-02-18 ASSESSMENT — PAIN DESCRIPTION - ORIENTATION: ORIENTATION: LEFT;MID

## 2023-02-18 ASSESSMENT — PAIN DESCRIPTION - FREQUENCY: FREQUENCY: CONTINUOUS

## 2023-02-18 ASSESSMENT — PAIN DESCRIPTION - PAIN TYPE: TYPE: ACUTE PAIN

## 2023-02-18 ASSESSMENT — PAIN SCALES - GENERAL
PAINLEVEL_OUTOF10: 9
PAINLEVEL_OUTOF10: 9

## 2023-02-18 ASSESSMENT — PAIN DESCRIPTION - DESCRIPTORS: DESCRIPTORS: SPASM;STABBING;SHARP

## 2023-02-18 ASSESSMENT — PAIN DESCRIPTION - LOCATION: LOCATION: BACK

## 2023-02-18 NOTE — ED PROVIDER NOTES
[address] [facility] ENCOUNTER      Pt Name: Nelson Gaxiola. MRN: [id number]  Birthdate 1950  Date of evaluation: 2/18/2023      CHIEF COMPLAINT       Chief Complaint   Patient presents with    Back Pain     States spasms mid left back. Denies injury         HISTORY OF PRESENT ILLNESS      The patient presents with back pain in the lower thoracic area. He says he is having spasms, primarily on the left side. He does not recall any trauma, but he did bend over last night and that is when his pain started. He has had prior lumbar surgery on the discs. That has not been a problem. He takes Percocet on a daily basis. He says it has not really helped his pain. He denies focal weakness or numbness. He denies bowel or bladder issues. He has not had a fever. REVIEW OF SYSTEMS       All systems reviewed and negative unless noted in HPI. The patient denies fever or constitutional symptoms. Denies vision change. Denies any sore throat or rhinorrhea. Denies any neck pain or stiffness. Denies chest pain or shortness of breath. No nausea,  vomiting or diarrhea. Back pain as noted in HPI. Denies any weakness, numbness or focal neurologic deficit. Denies any skin rash or edema. No recent psychiatric issues. No easy bruising or bleeding. History of diabetes. PAST MEDICAL HISTORY    has a past medical history of Arthritis, COPD (chronic obstructive pulmonary disease) ([facility] [address] Diabetes mellitus ([facility] [address] and GURPREET on CPAP. SURGICAL HISTORY      has a past surgical history that includes Appendectomy (1952); Tonsillectomy (1959); Cholecystectomy, laparoscopic (1996); Total knee arthroplasty (Left, 02/01/2017); joint replacement (Right, 11/07/2017); lumbar laminectomy (10/24/2018); and Spinal Cord Stimulator Surgery (Right, 05/2021).     CURRENT MEDICATIONS       Previous Medications    ALBUTEROL SULFATE HFA (PROAIR HFA) 108 (90 BASE) MCG/ACT INHALER Inhale 2 puffs into the lungs every 6 hours as needed for Wheezing    ATORVASTATIN (LIPITOR) 20 MG TABLET    Take 1 tablet by mouth nightly    BENAZEPRIL (LOTENSIN) 20 MG TABLET    TAKE 1 TABLET BY MOUTH EVERY DAY    BUDESONIDE-FORMOTEROL (SYMBICORT) 160-4.5 MCG/ACT AERO    Inhale 2 puffs into the lungs 2 times daily    CELECOXIB (CELEBREX) 200 MG CAPSULE    TAKE 1 CAPSULE BY MOUTH EVERY DAY    CEPHALEXIN (KEFLEX) 500 MG CAPSULE    Take 1 capsule by mouth 2 times daily    DIPHENHYDRAMINE (BENADRYL) 25 MG TABLET    Take 25 mg by mouth every 6 hours as needed for Itching    ELASTIC BANDAGES & SUPPORTS (MEDICAL COMPRESSION STOCKINGS) MISC    2 each by Does not apply route daily Right and left knee high compression stockings. 20-30 mmHg. To be worn continuously throughout the day. FLUOCINONIDE (LIDEX) 0.05 % CREAM    apply topically twice a day as needed    FUROSEMIDE (LASIX) 40 MG TABLET    Take 1 tablet by mouth daily    GABAPENTIN (NEURONTIN) 300 MG CAPSULE    TAKE 1 CAPSULE BY MOUTH FOUR TIMES A DAY    HYDROCHLOROTHIAZIDE (HYDRODIURIL) 25 MG TABLET    1 tablet    MELOXICAM (MOBIC) 15 MG TABLET    1 tablet Orally Once a day for 30 days    METFORMIN (GLUCOPHAGE) 1000 MG TABLET    Take 1 tablet by mouth daily (with breakfast)    MULTIPLE VITAMINS-MINERALS (CENTRUM SILVER PO)    Take by mouth    TRAZODONE (DESYREL) 50 MG TABLET    TAKE 1 TABLET BY MOUTH EVERY DAY AT NIGHT    VARENICLINE (CHANTIX CONTINUING MONTH PAK) 1 MG TABLET    Take 1 tablet by mouth 2 times daily    VARENICLINE TARTRATE, STARTER, (CHANTIX STARTING MONTH PAK) 0.5 MG X 11 & 1 MG X 42 TBPK    Per package insert    VITAMIN D 25 MCG (1000 UT) CAPS    Take 2 capsules by mouth daily       ALLERGIES     is allergic to cheratussin ac [guaifenesin-codeine]. FAMILY HISTORY     has no family status information on file. family history is not on file. SOCIAL HISTORY      reports that he has been smoking cigarettes.  He has a 42.00 pack-year smoking history. He has never used smokeless tobacco. He reports that he does not drink alcohol and does not use drugs. PHYSICAL EXAM     INITIAL VITALS:  height is 5' 7\" (1.702 m) and weight is 122.5 kg (270 lb). His oral temperature is 98.6 °F (37 °C). His blood pressure is 152/99 (abnormal) and his pulse is 82. His respiration is 28 and oxygen saturation is 94%. The patient is alert and oriented, in no apparent distress. HEENT is atraumatic. Pupils are PERRL at 4 mm. Mucous membranes moist.    Neck is supple. Heart sounds regular rate and rhythm. Lungs coarse bilaterally. Abdomen: soft, nontender with no pain to palpation. Musculoskeletal exam shows no evidence of trauma. Patient locates pain in the low thoracic area, on the left. Mild to moderate muscle spasm is noted. No midline tenderness. Well-healed scar in lumbar area from prior surgery but no redness or warmth is appreciated plus 5 out of 5 gross motor strength in all extremities. Skin: no rash or edema. Neurological exam reveals cranial nerves 2 through 12 grossly intact. Patient has equal  and normal deep tendon reflexes. No saddle paresthesias. Psychiatric: no hallucinations or suicidal ideation. Lymphatics.:  No lymphadenopathy.        DIFFERENTIAL DIAGNOSIS/ MDM:     Differential diagnosis considered: Strain, fracture, cauda equina syndrome, epidural abscess    Chronic Conditions affecting care (DM,HTN,CA, etc): Diabetes, COPD, chronic pain    Social Determinants of Health affecting care (unable to care for self, lives alone, unemployed, homeless,etc): None      History source(s) (patient,spouse,parent,family,friend,EMS,etc): Patient      Review of external sources (ECF,Hospital records,EMS report, radiology reports, etc): Previous hospital records      Tests considered but not ordered: CT scan, plain film, MRI    Independent interpretation of tests (eg.  X-ray, CAT scan, Doppler studies, EKG): None Discussion of x-ray results with radiology: Not applicable      Consults: None      Consideration for admission/observation (even if discharged): Not applicable      Prescription considerations: The patient already takes Percocet. I will write for a Lidoderm patch that he can apply to the area of pain. Sepsis considered: Not applicable. No fever or tachycardia. No redness or warmth. Critical Care note written: Not applicable        DIAGNOSTIC RESULTS         EMERGENCY DEPARTMENT COURSE:   Vitals:    Vitals:    02/18/23 1112   BP: (!) 152/99   Pulse: 82   Resp: 28   Temp: 98.6 °F (37 °C)   TempSrc: Oral   SpO2: 94%   Weight: 122.5 kg (270 lb)   Height: 5' 7\" (1.702 m)     -------------------------  BP: (!) 152/99, Temp: 98.6 °F (37 °C), Heart Rate: 82, Resp: 28      Re-evaluation Notes    My index of suspicion for an acute surgical emergency including abscess or cauda equina syndrome is low. The patient received IM pain medicine here. I will write for a Lidoderm patch and he may continue Percocet as directed. The patient is discharged in good condition. FINAL IMPRESSION      1. Thoracic myofascial strain, initial encounter          DISPOSITION/PLAN   DISPOSITION Decision To Discharge 02/18/2023 11:32:20 AM      Condition on Disposition    good    [facility] [address]  [place] [address] [phone]    In 3 days      DISCHARGE MEDICATIONS:  New Prescriptions    LIDOCAINE (LIDODERM) 5 %    Place 1 patch onto the skin daily 12 hours on, 12 hours off.        (Please note that portions of this note were completed with a voice recognition program.  Efforts were made to edit the dictations but occasionally words are mis-transcribed.)    Sage Chapa MD,, MD   Attending Emergency Physician          Wade Lyons MD  02/18/23 [id number]

## 2023-02-18 NOTE — DISCHARGE INSTRUCTIONS
May apply Lidoderm patch to painful areas. Continue Percocet as directed. Return for fever, weakness, numbness, bowel or bladder problems, or if worse in any way. PLEASE RETURN TO THE EMERGENCY DEPARTMENT IMMEDIATELY if your symptoms worsen in anyway or in 1-2 days if not improved for re-evaluation. You should immediately return to the ER for symptoms such as worsening pain, abdominal pain, bloody stools, numbness or weakness to the arms or legs, difficulty with urination or defecation, difficulty with ambulation, fever, coolness or color change to the extremity, chest pain, shortness of breath, a feeling that you are going to pass out, light headed, dizziness. Take your medication as indicated and prescribed. If you are given an antibiotic then, make sure you get the prescription filled and take the antibiotics until finished. Please understand that at this time there is no evidence for a more serious underlying process, but that early in the process of an illness or injury, an emergency department workup can be falsely reassuring. You should contact your family doctor within the next 48 hours for a follow up appointment    Ry Moon!!!    From [facility] ([facility]) and [facility] Emergency Services    On behalf of the Emergency Department staff at [facility]), I would like to thank you for giving us the opportunity to address your health care needs and concerns. We hope that during your visit, our service was delivered in a professional and caring manner. Please keep [facility]) in mind as we walk with you down the path to your own personal wellness. Please expect an automated text message or email from us so we can ask a few questions about your health and progress. Based on your answers, a clinician may call you back to offer help and instructions. Please understand that early in the process of an illness or injury, an emergency department workup can be falsely reassuring.   If you notice any worsening, changing or persistent symptoms please call your family doctor or return to the ER immediately. Tell us how we did during your visit at [email or web address]   and let us know about your experience

## 2023-02-18 NOTE — TELEPHONE ENCOUNTER
Pt's wife Vivien Hernandez calling. Pt. of Dr. Dannie Chopra. Wife states  called after hours service earlier re: his Percocet refill and was led to believe the physician would be calling the med in to his pharmacy [facility] in [place]). Writer explained the provider has a no after hours medications or refills policy. Wife states  went to ED earlier today and was only given Lidocaine patches. Pt. was given \"some type of shot\" in the ED with little relief. Wife states  has now been 24 hours without his Percocet and is in a \"lot of pain\". Writer checks pt's chart via [employer] and notes an open refill request encounter from 2/17 that has not yet been addressed by the physician. Writer offers to send telephone encounter courtesy notification and advises wife to have  contact the office during regular business hours. Wife verbalizes frustration but is agreeable and will have  call office on Monday morning.

## 2023-02-20 RX ORDER — OXYCODONE AND ACETAMINOPHEN 10; 325 MG/1; MG/1
1 TABLET ORAL EVERY 8 HOURS PRN
Qty: 90 TABLET | Refills: 0 | Status: SHIPPED | OUTPATIENT
Start: 2023-02-20 | End: 2023-03-22

## 2023-03-12 DIAGNOSIS — J44.9 CHRONIC OBSTRUCTIVE PULMONARY DISEASE, UNSPECIFIED COPD TYPE (HCC): ICD-10-CM

## 2023-03-13 RX ORDER — HYDROCHLOROTHIAZIDE 50 MG/1
TABLET ORAL
Qty: 90 TABLET | Refills: 0 | Status: SHIPPED | OUTPATIENT
Start: 2023-03-13

## 2023-03-13 RX ORDER — GABAPENTIN 300 MG/1
CAPSULE ORAL
Qty: 360 CAPSULE | Refills: 0 | Status: SHIPPED | OUTPATIENT
Start: 2023-03-13 | End: 2023-06-11

## 2023-03-20 DIAGNOSIS — M54.50 ACUTE MIDLINE LOW BACK PAIN WITHOUT SCIATICA: ICD-10-CM

## 2023-03-20 RX ORDER — OXYCODONE AND ACETAMINOPHEN 10; 325 MG/1; MG/1
1 TABLET ORAL EVERY 8 HOURS PRN
Qty: 90 TABLET | Refills: 0 | Status: SHIPPED | OUTPATIENT
Start: 2023-03-20 | End: 2023-04-19

## 2023-03-20 NOTE — TELEPHONE ENCOUNTER
LAST VISIT:   2/7/2023     Future Appointments   Date Time Provider Ambika Young   5/8/2023  3:50 PM MD GABI Loomis

## 2023-04-07 ENCOUNTER — APPOINTMENT (OUTPATIENT)
Dept: GENERAL RADIOLOGY | Age: 73
End: 2023-04-07
Payer: MEDICARE

## 2023-04-07 ENCOUNTER — HOSPITAL ENCOUNTER (EMERGENCY)
Age: 73
Discharge: HOME | End: 2023-04-07
Attending: EMERGENCY MEDICINE
Payer: MEDICARE

## 2023-04-07 VITALS
DIASTOLIC BLOOD PRESSURE: 93 MMHG | OXYGEN SATURATION: 94 % | TEMPERATURE: 98.1 F | SYSTOLIC BLOOD PRESSURE: 175 MMHG | HEIGHT: 67 IN | WEIGHT: 270 LBS | HEART RATE: 82 BPM | BODY MASS INDEX: 42.38 KG/M2 | RESPIRATION RATE: 16 BRPM

## 2023-04-07 DIAGNOSIS — G89.29 ACUTE EXACERBATION OF CHRONIC LOW BACK PAIN: Primary | ICD-10-CM

## 2023-04-07 DIAGNOSIS — M54.50 ACUTE EXACERBATION OF CHRONIC LOW BACK PAIN: Primary | ICD-10-CM

## 2023-04-07 PROCEDURE — 72100 X-RAY EXAM L-S SPINE 2/3 VWS: CPT

## 2023-04-07 PROCEDURE — 6370000000 HC RX 637 (ALT 250 FOR IP): Performed by: EMERGENCY MEDICINE

## 2023-04-07 PROCEDURE — 6360000002 HC RX W HCPCS: Performed by: EMERGENCY MEDICINE

## 2023-04-07 RX ORDER — PREDNISONE 50 MG/1
50 TABLET ORAL DAILY
Qty: 5 TABLET | Refills: 0 | Status: SHIPPED | OUTPATIENT
Start: 2023-04-07 | End: 2023-04-12

## 2023-04-07 RX ORDER — CYCLOBENZAPRINE HCL 10 MG
10 TABLET ORAL ONCE
Status: COMPLETED | OUTPATIENT
Start: 2023-04-07 | End: 2023-04-07

## 2023-04-07 RX ORDER — CYCLOBENZAPRINE HCL 10 MG
10 TABLET ORAL 3 TIMES DAILY PRN
Qty: 9 TABLET | Refills: 0 | Status: SHIPPED | OUTPATIENT
Start: 2023-04-07 | End: 2023-04-10

## 2023-04-07 RX ORDER — KETOROLAC TROMETHAMINE 30 MG/ML
30 INJECTION, SOLUTION INTRAMUSCULAR; INTRAVENOUS ONCE
Status: COMPLETED | OUTPATIENT
Start: 2023-04-07 | End: 2023-04-07

## 2023-04-07 RX ORDER — ORPHENADRINE CITRATE 30 MG/ML
60 INJECTION INTRAMUSCULAR; INTRAVENOUS ONCE
Status: DISCONTINUED | OUTPATIENT
Start: 2023-04-07 | End: 2023-04-07 | Stop reason: HOSPADM

## 2023-04-07 RX ADMIN — KETOROLAC TROMETHAMINE 30 MG: 30 INJECTION, SOLUTION INTRAMUSCULAR at 15:52

## 2023-04-07 RX ADMIN — CYCLOBENZAPRINE 10 MG: 10 TABLET, FILM COATED ORAL at 15:53

## 2023-04-07 ASSESSMENT — PAIN SCALES - GENERAL
PAINLEVEL_OUTOF10: 9
PAINLEVEL_OUTOF10: 9

## 2023-04-07 ASSESSMENT — PAIN - FUNCTIONAL ASSESSMENT: PAIN_FUNCTIONAL_ASSESSMENT: 0-10

## 2023-04-07 NOTE — ED PROVIDER NOTES
of this note werecompleted with a voice recognition program.  Efforts were made to edit the dictations but occasionally words are mis-transcribed.)    Jojo Lorenz MD MD, F.A.C.E.P, F.A.A.E.M  Emergency Physician Attending          Jojo Lorenz MD  04/07/23 5510

## 2023-04-14 ENCOUNTER — TELEPHONE (OUTPATIENT)
Dept: PHARMACY | Facility: CLINIC | Age: 73
End: 2023-04-14

## 2023-04-17 ENCOUNTER — OFFICE VISIT (OUTPATIENT)
Dept: PRIMARY CARE CLINIC | Age: 73
End: 2023-04-17
Payer: MEDICARE

## 2023-04-17 VITALS
OXYGEN SATURATION: 94 % | HEIGHT: 67 IN | HEART RATE: 90 BPM | DIASTOLIC BLOOD PRESSURE: 82 MMHG | SYSTOLIC BLOOD PRESSURE: 138 MMHG | BODY MASS INDEX: 44.54 KG/M2 | WEIGHT: 283.8 LBS

## 2023-04-17 DIAGNOSIS — E11.9 CONTROLLED TYPE 2 DIABETES MELLITUS WITHOUT COMPLICATION, WITHOUT LONG-TERM CURRENT USE OF INSULIN (HCC): ICD-10-CM

## 2023-04-17 DIAGNOSIS — M25.551 BILATERAL HIP PAIN: Primary | ICD-10-CM

## 2023-04-17 DIAGNOSIS — M25.552 BILATERAL HIP PAIN: Primary | ICD-10-CM

## 2023-04-17 DIAGNOSIS — M54.50 ACUTE MIDLINE LOW BACK PAIN WITHOUT SCIATICA: ICD-10-CM

## 2023-04-17 PROCEDURE — 1123F ACP DISCUSS/DSCN MKR DOCD: CPT | Performed by: FAMILY MEDICINE

## 2023-04-17 PROCEDURE — 99213 OFFICE O/P EST LOW 20 MIN: CPT | Performed by: FAMILY MEDICINE

## 2023-04-17 PROCEDURE — 3044F HG A1C LEVEL LT 7.0%: CPT | Performed by: FAMILY MEDICINE

## 2023-04-17 RX ORDER — OXYCODONE AND ACETAMINOPHEN 10; 325 MG/1; MG/1
1 TABLET ORAL EVERY 8 HOURS PRN
Qty: 90 TABLET | Refills: 0 | Status: SHIPPED | OUTPATIENT
Start: 2023-04-17 | End: 2023-05-17

## 2023-04-17 RX ORDER — SEMAGLUTIDE 1.34 MG/ML
0.5 INJECTION, SOLUTION SUBCUTANEOUS WEEKLY
Qty: 2 ADJUSTABLE DOSE PRE-FILLED PEN SYRINGE | Refills: 5 | Status: SHIPPED | OUTPATIENT
Start: 2023-04-17

## 2023-04-17 RX ORDER — CELECOXIB 200 MG/1
CAPSULE ORAL
Qty: 30 CAPSULE | Refills: 0 | Status: SHIPPED | OUTPATIENT
Start: 2023-04-17

## 2023-04-17 ASSESSMENT — ENCOUNTER SYMPTOMS
ABDOMINAL PAIN: 0
DIARRHEA: 0
VOMITING: 0
EYE REDNESS: 0
WHEEZING: 0
RHINORRHEA: 0
EYE DISCHARGE: 0
NAUSEA: 0
SORE THROAT: 0
COUGH: 0
SHORTNESS OF BREATH: 0

## 2023-04-17 NOTE — PROGRESS NOTES
Procedures    XR HIP BILATERAL W AP PELVIS (2 VIEWS)     Standing Status:   Future     Standing Expiration Date:   4/17/2024     Order Specific Question:   Reason for exam:     Answer:   bilateral hip pain     Orders Placed This Encounter   Medications    Semaglutide,0.25 or 0.5MG/DOS, (OZEMPIC, 0.25 OR 0.5 MG/DOSE,) 2 MG/1.5ML SOPN     Sig: Inject 0.5 mg into the skin once a week     Dispense:  2 Adjustable Dose Pre-filled Pen Syringe     Refill:  5    oxyCODONE-acetaminophen (PERCOCET)  MG per tablet     Sig: Take 1 tablet by mouth every 8 hours as needed for Pain for up to 30 days. Dispense:  90 tablet     Refill:  0     Reduce doses taken as pain becomes manageable       Patient given educational materials - see patient instructions. Discussed use, benefit, and side effects of prescribed medications. All patient questions answered. Pt voiced understanding. Reviewed health maintenance. Instructed to continue current medications, diet andexercise. Patient agreed with treatment plan. Follow up as directed.      Electronicallysigned by Nabil Deleon MD on 4/17/2023 at 11:25 AM

## 2023-05-17 DIAGNOSIS — M54.50 ACUTE MIDLINE LOW BACK PAIN WITHOUT SCIATICA: ICD-10-CM

## 2023-05-17 RX ORDER — TRAZODONE HYDROCHLORIDE 50 MG/1
TABLET ORAL
Qty: 90 TABLET | Refills: 3 | Status: SHIPPED | OUTPATIENT
Start: 2023-05-17

## 2023-05-17 RX ORDER — OXYCODONE AND ACETAMINOPHEN 10; 325 MG/1; MG/1
1 TABLET ORAL EVERY 8 HOURS PRN
Qty: 90 TABLET | Refills: 0 | Status: SHIPPED | OUTPATIENT
Start: 2023-05-17 | End: 2023-06-16

## 2023-05-17 NOTE — TELEPHONE ENCOUNTER
LAST VISIT:   4/17/2023     Future Appointments   Date Time Provider Ambika Young   7/18/2023  4:00 PM MD GABI Goins

## 2023-06-07 RX ORDER — GABAPENTIN 300 MG/1
300 CAPSULE ORAL 4 TIMES DAILY
Qty: 360 CAPSULE | Refills: 0 | Status: SHIPPED | OUTPATIENT
Start: 2023-06-07 | End: 2023-09-05

## 2023-06-14 DIAGNOSIS — J44.9 CHRONIC OBSTRUCTIVE PULMONARY DISEASE, UNSPECIFIED COPD TYPE (HCC): ICD-10-CM

## 2023-06-14 RX ORDER — HYDROCHLOROTHIAZIDE 50 MG/1
TABLET ORAL
Qty: 90 TABLET | Refills: 0 | OUTPATIENT
Start: 2023-06-14

## 2023-06-14 RX ORDER — CELECOXIB 200 MG/1
CAPSULE ORAL
Qty: 30 CAPSULE | Refills: 0 | OUTPATIENT
Start: 2023-06-14

## 2023-06-21 ENCOUNTER — TELEPHONE (OUTPATIENT)
Dept: PRIMARY CARE CLINIC | Age: 73
End: 2023-06-21

## 2023-06-24 DIAGNOSIS — J44.9 CHRONIC OBSTRUCTIVE PULMONARY DISEASE, UNSPECIFIED COPD TYPE (HCC): ICD-10-CM

## 2023-06-26 ENCOUNTER — OFFICE VISIT (OUTPATIENT)
Dept: PRIMARY CARE CLINIC | Age: 73
End: 2023-06-26
Payer: MEDICARE

## 2023-06-26 VITALS
OXYGEN SATURATION: 92 % | BODY MASS INDEX: 44.13 KG/M2 | DIASTOLIC BLOOD PRESSURE: 72 MMHG | HEIGHT: 67 IN | HEART RATE: 80 BPM | WEIGHT: 281.2 LBS | SYSTOLIC BLOOD PRESSURE: 134 MMHG

## 2023-06-26 DIAGNOSIS — Z00.00 ANNUAL PHYSICAL EXAM: ICD-10-CM

## 2023-06-26 DIAGNOSIS — Z13.6 ENCOUNTER FOR LIPID SCREENING FOR CARDIOVASCULAR DISEASE: ICD-10-CM

## 2023-06-26 DIAGNOSIS — R60.0 LOCALIZED EDEMA: ICD-10-CM

## 2023-06-26 DIAGNOSIS — J44.9 CHRONIC OBSTRUCTIVE PULMONARY DISEASE, UNSPECIFIED COPD TYPE (HCC): ICD-10-CM

## 2023-06-26 DIAGNOSIS — Z13.220 ENCOUNTER FOR LIPID SCREENING FOR CARDIOVASCULAR DISEASE: ICD-10-CM

## 2023-06-26 DIAGNOSIS — M96.1 POST LAMINECTOMY SYNDROME: ICD-10-CM

## 2023-06-26 DIAGNOSIS — Z12.5 SCREENING PSA (PROSTATE SPECIFIC ANTIGEN): ICD-10-CM

## 2023-06-26 DIAGNOSIS — E11.9 CONTROLLED TYPE 2 DIABETES MELLITUS WITHOUT COMPLICATION, WITHOUT LONG-TERM CURRENT USE OF INSULIN (HCC): Primary | ICD-10-CM

## 2023-06-26 LAB — HBA1C MFR BLD: 6.3 %

## 2023-06-26 PROCEDURE — 1123F ACP DISCUSS/DSCN MKR DOCD: CPT | Performed by: FAMILY MEDICINE

## 2023-06-26 PROCEDURE — 83037 HB GLYCOSYLATED A1C HOME DEV: CPT | Performed by: FAMILY MEDICINE

## 2023-06-26 PROCEDURE — 3044F HG A1C LEVEL LT 7.0%: CPT | Performed by: FAMILY MEDICINE

## 2023-06-26 PROCEDURE — 99214 OFFICE O/P EST MOD 30 MIN: CPT | Performed by: FAMILY MEDICINE

## 2023-06-26 RX ORDER — HYDROCHLOROTHIAZIDE 50 MG/1
TABLET ORAL
Qty: 90 TABLET | Refills: 0 | OUTPATIENT
Start: 2023-06-26

## 2023-06-26 RX ORDER — FUROSEMIDE 40 MG/1
40 TABLET ORAL DAILY
Qty: 90 TABLET | Refills: 3 | Status: SHIPPED | OUTPATIENT
Start: 2023-06-26

## 2023-06-26 RX ORDER — VARENICLINE TARTRATE 1 MG/1
1 TABLET, FILM COATED ORAL 2 TIMES DAILY
Qty: 60 TABLET | Refills: 5 | Status: SHIPPED | OUTPATIENT
Start: 2023-06-26

## 2023-06-26 RX ORDER — VARENICLINE TARTRATE
KIT
Qty: 53 EACH | Refills: 0 | Status: SHIPPED | OUTPATIENT
Start: 2023-06-26

## 2023-06-26 ASSESSMENT — ENCOUNTER SYMPTOMS
VOMITING: 0
SHORTNESS OF BREATH: 0
SORE THROAT: 0
RHINORRHEA: 0
ABDOMINAL PAIN: 0
EYE REDNESS: 0
COUGH: 0
NAUSEA: 0
DIARRHEA: 0
WHEEZING: 0
BACK PAIN: 1
EYE DISCHARGE: 0

## 2023-07-10 DIAGNOSIS — J44.9 CHRONIC OBSTRUCTIVE PULMONARY DISEASE, UNSPECIFIED COPD TYPE (HCC): ICD-10-CM

## 2023-07-10 RX ORDER — CELECOXIB 200 MG/1
CAPSULE ORAL
Qty: 30 CAPSULE | Refills: 0 | Status: SHIPPED | OUTPATIENT
Start: 2023-07-10

## 2023-07-10 RX ORDER — HYDROCHLOROTHIAZIDE 50 MG/1
TABLET ORAL
Qty: 90 TABLET | Refills: 0 | Status: SHIPPED | OUTPATIENT
Start: 2023-07-10

## 2023-07-14 DIAGNOSIS — J44.9 CHRONIC OBSTRUCTIVE PULMONARY DISEASE, UNSPECIFIED COPD TYPE (HCC): ICD-10-CM

## 2023-07-14 DIAGNOSIS — M54.50 ACUTE MIDLINE LOW BACK PAIN WITHOUT SCIATICA: ICD-10-CM

## 2023-07-14 RX ORDER — HYDROCHLOROTHIAZIDE 50 MG/1
TABLET ORAL
Qty: 90 TABLET | Refills: 0 | OUTPATIENT
Start: 2023-07-14

## 2023-07-16 DIAGNOSIS — E11.9 CONTROLLED TYPE 2 DIABETES MELLITUS WITHOUT COMPLICATION, WITHOUT LONG-TERM CURRENT USE OF INSULIN (HCC): ICD-10-CM

## 2023-07-17 RX ORDER — BENAZEPRIL HYDROCHLORIDE 20 MG/1
TABLET ORAL
Qty: 100 TABLET | Refills: 0 | Status: SHIPPED | OUTPATIENT
Start: 2023-07-17

## 2023-07-17 RX ORDER — OXYCODONE AND ACETAMINOPHEN 10; 325 MG/1; MG/1
1 TABLET ORAL EVERY 8 HOURS PRN
Qty: 90 TABLET | Refills: 0 | Status: SHIPPED | OUTPATIENT
Start: 2023-07-17 | End: 2023-08-16

## 2023-07-17 NOTE — TELEPHONE ENCOUNTER
LAST VISIT:   6/26/2023     Future Appointments   Date Time Provider [address]   10/5/2023 11:00 AM MD GABI Tee

## 2023-08-09 DIAGNOSIS — E11.9 CONTROLLED TYPE 2 DIABETES MELLITUS WITHOUT COMPLICATION, WITHOUT LONG-TERM CURRENT USE OF INSULIN (HCC): ICD-10-CM

## 2023-08-09 DIAGNOSIS — J44.9 CHRONIC OBSTRUCTIVE PULMONARY DISEASE, UNSPECIFIED COPD TYPE (HCC): ICD-10-CM

## 2023-08-09 NOTE — TELEPHONE ENCOUNTER
LAST VISIT:   6/26/2023     Future Appointments   Date Time Provider [address]   10/5/2023 11:00 AM MD [facility] Rachel Disla

## 2023-08-10 RX ORDER — ATORVASTATIN CALCIUM 20 MG/1
TABLET, FILM COATED ORAL
Qty: 100 TABLET | Refills: 0 | Status: SHIPPED | OUTPATIENT
Start: 2023-08-10

## 2023-08-10 RX ORDER — CELECOXIB 200 MG/1
CAPSULE ORAL
Qty: 30 CAPSULE | Refills: 0 | Status: SHIPPED | OUTPATIENT
Start: 2023-08-10

## 2023-08-10 RX ORDER — GABAPENTIN 300 MG/1
CAPSULE ORAL
Qty: 360 CAPSULE | Refills: 1 | Status: SHIPPED | OUTPATIENT
Start: 2023-08-10 | End: 2023-11-08

## 2023-08-10 RX ORDER — HYDROCHLOROTHIAZIDE 50 MG/1
TABLET ORAL
Qty: 90 TABLET | Refills: 0 | Status: SHIPPED | OUTPATIENT
Start: 2023-08-10

## 2023-08-11 DIAGNOSIS — J44.9 CHRONIC OBSTRUCTIVE PULMONARY DISEASE, UNSPECIFIED COPD TYPE (HCC): ICD-10-CM

## 2023-08-11 RX ORDER — BUDESONIDE AND FORMOTEROL FUMARATE DIHYDRATE 160; 4.5 UG/1; UG/1
2 AEROSOL RESPIRATORY (INHALATION) 2 TIMES DAILY
Qty: 10.2 G | Refills: 5 | Status: SHIPPED | OUTPATIENT
Start: 2023-08-11

## 2023-08-17 DIAGNOSIS — M54.50 ACUTE MIDLINE LOW BACK PAIN WITHOUT SCIATICA: ICD-10-CM

## 2023-08-17 RX ORDER — OXYCODONE AND ACETAMINOPHEN 10; 325 MG/1; MG/1
1 TABLET ORAL EVERY 8 HOURS PRN
Qty: 90 TABLET | Refills: 0 | Status: SHIPPED | OUTPATIENT
Start: 2023-08-17 | End: 2023-09-16

## 2023-08-21 ENCOUNTER — TELEPHONE (OUTPATIENT)
Dept: PHARMACY | Facility: CLINIC | Age: 73
End: 2023-08-21

## 2023-08-21 NOTE — TELEPHONE ENCOUNTER
[facility] CLINICAL PHARMACY: ADHERENCE REVIEW  Identified care gap per [employer]: fills at [facility]: ACE/ARB, Diabetes, and Statin adherence    ASSESSMENT    ACE/ARB ADHERENCE    Insurance Records claims through 23 (Prior Year PDC = 99% - PASSED; YTD PDC = 89%; Potential Fail Date: 10/2/23):   BENAZEPRIL   TAB 20MG last filled on 23 for 100 day supply. Next refill due: 23    Prescribed si tablet/capsule daily    Per Insurer Portal: last filled on 23 for 100 day supply. BP Readings from Last 3 Encounters:   23 134/72   23 138/82   23 (!) 175/93     CrCl cannot be calculated (Patient's most recent lab result is older than the maximum 180 days allowed. ). Lab Results   Component Value Date    CREATININE 0.92 2022     Lab Results   Component Value Date    K 4.5 2022     DIABETES ADHERENCE    Insurance Records claims through 23 (Prior Year PDC = 99% - PASSED; YTD PDC = 75%; Potential Fail Date: 23): METFORMIN    TAB 1000MG last filled on 23 for 100 day supply. Next refill due: 23    Prescribed si tablet/capsule daily    Per Reconcile Dispense History and Insurer Portal:   Per Evoke: RTS 23  Per Reconcile Dispense 8/15/23 30ds       Per [facility]:  patient is enrolled in script sync and was filled on 8/15/23 for 30ds    Lab Results   Component Value Date    LABA1C 6.3 2023    LABA1C 6.2 2023    LABA1C 6.3 2022         STATIN ADHERENCE    Insurance Records claims through 23 (Prior Year PDC = 99% - PASSED; YTD PDC = 86%; Potential Fail Date: 10/2/23):   ATORVASTATIN TAB 20MG last filled on 23 for 30 day supply. Next refill due: 23    Prescribed si tablet/capsule daily    Per Insurer Portal: last filled on 22 for 100 day supply.          Lab Results   Component Value Date    CHOL 133 2018    TRIG 123 2018    HDL 32 (L) 2022    LDLCHOLESTEROL 70 2022    LDLCALC 65

## 2023-09-12 DIAGNOSIS — E11.9 CONTROLLED TYPE 2 DIABETES MELLITUS WITHOUT COMPLICATION, WITHOUT LONG-TERM CURRENT USE OF INSULIN (HCC): ICD-10-CM

## 2023-09-12 RX ORDER — ATORVASTATIN CALCIUM 20 MG/1
TABLET, FILM COATED ORAL
Qty: 100 TABLET | Refills: 5 | Status: SHIPPED | OUTPATIENT
Start: 2023-09-12

## 2023-09-15 DIAGNOSIS — M54.50 ACUTE MIDLINE LOW BACK PAIN WITHOUT SCIATICA: ICD-10-CM

## 2023-09-15 DIAGNOSIS — J44.9 CHRONIC OBSTRUCTIVE PULMONARY DISEASE, UNSPECIFIED COPD TYPE (HCC): ICD-10-CM

## 2023-09-15 RX ORDER — HYDROCHLOROTHIAZIDE 50 MG/1
50 TABLET ORAL DAILY
Qty: 90 TABLET | Refills: 0 | Status: SHIPPED | OUTPATIENT
Start: 2023-09-15

## 2023-09-15 RX ORDER — OXYCODONE AND ACETAMINOPHEN 10; 325 MG/1; MG/1
1 TABLET ORAL EVERY 8 HOURS PRN
Qty: 90 TABLET | Refills: 0 | Status: SHIPPED | OUTPATIENT
Start: 2023-09-15 | End: 2023-10-15

## 2023-09-15 RX ORDER — GABAPENTIN 300 MG/1
CAPSULE ORAL
Qty: 360 CAPSULE | Refills: 1 | Status: SHIPPED | OUTPATIENT
Start: 2023-09-15 | End: 2023-10-15

## 2023-09-15 NOTE — TELEPHONE ENCOUNTER
LAST VISIT:   6/26/2023     Future Appointments   Date Time Provider [address]   10/23/2023  3:30 PM MD GABI Santana Records

## 2023-09-20 DIAGNOSIS — E11.9 CONTROLLED TYPE 2 DIABETES MELLITUS WITHOUT COMPLICATION, WITHOUT LONG-TERM CURRENT USE OF INSULIN (HCC): ICD-10-CM

## 2023-09-20 NOTE — TELEPHONE ENCOUNTER
[facility] CLINICAL PHARMACY: ADHERENCE REVIEW  Identified care gap per [employer]: fills at [facility]: ACE/ARB and Diabetes adherence    Patient also appears to be prescribed: Statin    ASSESSMENT    ACE/ARB ADHERENCE    Insurance Records claims through 23 (Prior Year  [address] = 99% - PASSED; YTD  [address] = 80%; Potential Fail Date: 10/2/23):   BENAZEPRIL   TAB 20MG last filled on 23 for 100 day supply. Next refill due: 23    Prescribed si tablet/capsule daily    Per Insurer Portal: 100ds claims 23 and 23 reversed    BP Readings from Last 3 Encounters:   23 134/72   23 138/82   23 (!) 175/93     CrCl cannot be calculated (Patient's most recent lab result is older than the maximum 180 days allowed. ). Lab Results   Component Value Date    CREATININE 0.92 2022     Lab Results   Component Value Date    K 4.5 2022     DIABETES ADHERENCE    Insurance Records claims through 23 (Prior Year PDC = 99% - PASSED; YTD PDC = 77%; Potential Fail Date: 10/2/23): METFORMIN    TAB 1000MG last filled on 8/15/23 for 30 day supply. Next refill due: 23    Prescribed si tablet/capsule daily    Per Reconcile Dispense History and Insurer Portal: last filled on 23 for 30 day supply. - Per  telephone encounter, patient was getting set up on script sync and Sept fill was to be for 90ds? Lab Results   Component Value Date    LABA1C 6.3 2023    LABA1C 6.2 2023    LABA1C 6.3 2022     STATIN ADHERENCE    Insurance Records claims through 23 (Prior Year PDC = 99% - PASSED; YTD PDC = 87% - PASSED):   ATORVASTATIN TAB 20MG last filled on 23 for 100 day supply.  Next refill due: 23    Prescribed si tablet/capsule daily    Per chart, #100, 5 refills reordered 23    Lab Results   Component Value Date    CHOL 133 2018    TRIG 123 2018    HDL 32 (L) 2022    LDLCHOLESTEROL 70 2022    LDLCALC 65 2018     ALT

## 2023-09-20 NOTE — TELEPHONE ENCOUNTER
Deepali Dickson MD, patient eligible for up to 100-day supply, if appropriate.  Rx(s) pended for your signature/modification as appropriate    Last Visit: 6/26/23  Next Visit: 10/23/23    Thank you,  Janine Pineda, PharmD, [address] [place] [address], toll free: [phone], option 1

## 2023-09-21 NOTE — TELEPHONE ENCOUNTER
Noted metformin rx for 100ds approved, thank you! Per [address]: metformin 100ds will also be ready to  today with the benazepril 100ds.     Letter sent to patient.     =======================================================   For Pharmacy Admin Tracking Only    Program: Essie in place:  No  Recommendation Provided To: Provider: 1 via Note to Provider, Patient/Caregiver: 2 via Telephone, and Pharmacy: 1  Intervention Detail: Adherence Monitorin and New Rx: 1, reason: Improve Adherence  Intervention Accepted By: Provider: 1, Patient/Caregiver: 2, and Pharmacy: 1  Gap Closed?: Yes   Time Spent (min):  25

## 2023-09-26 ENCOUNTER — HOSPITAL ENCOUNTER (OUTPATIENT)
Age: 73
Discharge: HOME | End: 2023-09-28
Payer: MEDICARE

## 2023-09-26 ENCOUNTER — HOSPITAL ENCOUNTER (OUTPATIENT)
Dept: GENERAL RADIOLOGY | Age: 73
Discharge: HOME | End: 2023-09-28
Payer: MEDICARE

## 2023-09-26 DIAGNOSIS — M25.551 BILATERAL HIP PAIN: ICD-10-CM

## 2023-09-26 DIAGNOSIS — M25.552 BILATERAL HIP PAIN: ICD-10-CM

## 2023-09-26 PROCEDURE — 73521 X-RAY EXAM HIPS BI 2 VIEWS: CPT

## 2023-09-29 ENCOUNTER — OFFICE VISIT (OUTPATIENT)
Dept: PRIMARY CARE CLINIC | Age: 73
End: 2023-09-29

## 2023-09-29 VITALS
HEART RATE: 88 BPM | BODY MASS INDEX: 44.2 KG/M2 | HEIGHT: 66 IN | DIASTOLIC BLOOD PRESSURE: 68 MMHG | SYSTOLIC BLOOD PRESSURE: 126 MMHG | WEIGHT: 275 LBS | OXYGEN SATURATION: 94 %

## 2023-09-29 DIAGNOSIS — E08.65 DIABETES MELLITUS DUE TO UNDERLYING CONDITION WITH HYPERGLYCEMIA, WITHOUT LONG-TERM CURRENT USE OF INSULIN (HCC): ICD-10-CM

## 2023-09-29 DIAGNOSIS — R20.9 ALTERATIONS OF SENSATIONS: Primary | ICD-10-CM

## 2023-09-29 DIAGNOSIS — F17.200 SMOKER: ICD-10-CM

## 2023-09-29 DIAGNOSIS — G47.33 OSA ON CPAP: ICD-10-CM

## 2023-09-29 DIAGNOSIS — E66.01 OBESITY, MORBID, BMI 40.0-49.9 (HCC): ICD-10-CM

## 2023-09-29 ASSESSMENT — ENCOUNTER SYMPTOMS
DIARRHEA: 0
ABDOMINAL PAIN: 0
NAUSEA: 0
SHORTNESS OF BREATH: 0
VOMITING: 0

## 2023-10-12 DIAGNOSIS — M54.50 ACUTE MIDLINE LOW BACK PAIN WITHOUT SCIATICA: ICD-10-CM

## 2023-10-12 NOTE — TELEPHONE ENCOUNTER
LAST VISIT:   9/29/2023     Future Appointments   Date Time Provider [address]   10/23/2023  3:30 PM Anatoly Paulino  [address] in [place].

## 2023-10-13 RX ORDER — OXYCODONE AND ACETAMINOPHEN 10; 325 MG/1; MG/1
1 TABLET ORAL EVERY 8 HOURS PRN
Qty: 90 TABLET | Refills: 0 | Status: SHIPPED | OUTPATIENT
Start: 2023-10-13 | End: 2023-11-12

## 2023-11-14 DIAGNOSIS — M54.50 ACUTE MIDLINE LOW BACK PAIN WITHOUT SCIATICA: ICD-10-CM

## 2023-11-14 RX ORDER — OXYCODONE AND ACETAMINOPHEN 10; 325 MG/1; MG/1
1 TABLET ORAL 2 TIMES DAILY PRN
Qty: 60 TABLET | Refills: 0 | Status: SHIPPED | OUTPATIENT
Start: 2023-11-14 | End: 2023-12-14

## 2023-11-17 NOTE — TELEPHONE ENCOUNTER
Patient advised Percocet sent to pharmacy with new directions - take two daily as needed for pain. Told state medical board has new guidelines and he is to decrease his intake of pain medication. Voices understanding.   He will keep his appt with Dr. Venegas [place]on 12/7/23

## 2023-12-07 ENCOUNTER — OFFICE VISIT (OUTPATIENT)
Dept: PRIMARY CARE CLINIC | Age: 73
End: 2023-12-07
Payer: MEDICARE

## 2023-12-07 VITALS
WEIGHT: 288 LBS | OXYGEN SATURATION: 95 % | DIASTOLIC BLOOD PRESSURE: 72 MMHG | HEIGHT: 66 IN | SYSTOLIC BLOOD PRESSURE: 136 MMHG | BODY MASS INDEX: 46.28 KG/M2 | HEART RATE: 72 BPM

## 2023-12-07 DIAGNOSIS — Z23 NEED FOR VACCINATION: ICD-10-CM

## 2023-12-07 DIAGNOSIS — Z12.5 SCREENING PSA (PROSTATE SPECIFIC ANTIGEN): ICD-10-CM

## 2023-12-07 DIAGNOSIS — Z13.220 ENCOUNTER FOR LIPID SCREENING FOR CARDIOVASCULAR DISEASE: ICD-10-CM

## 2023-12-07 DIAGNOSIS — M96.1 POST LAMINECTOMY SYNDROME: ICD-10-CM

## 2023-12-07 DIAGNOSIS — Z79.899 HIGH RISK MEDICATION USE: ICD-10-CM

## 2023-12-07 DIAGNOSIS — J44.9 CHRONIC OBSTRUCTIVE PULMONARY DISEASE, UNSPECIFIED COPD TYPE (HCC): ICD-10-CM

## 2023-12-07 DIAGNOSIS — Z13.6 ENCOUNTER FOR LIPID SCREENING FOR CARDIOVASCULAR DISEASE: ICD-10-CM

## 2023-12-07 DIAGNOSIS — R60.0 LOWER EXTREMITY EDEMA: ICD-10-CM

## 2023-12-07 DIAGNOSIS — Z00.00 MEDICARE ANNUAL WELLNESS VISIT, SUBSEQUENT: Primary | ICD-10-CM

## 2023-12-07 DIAGNOSIS — F33.0 MAJOR DEPRESSIVE DISORDER, RECURRENT, MILD (HCC): ICD-10-CM

## 2023-12-07 DIAGNOSIS — Z00.00 ANNUAL PHYSICAL EXAM: ICD-10-CM

## 2023-12-07 DIAGNOSIS — Z87.891 PERSONAL HISTORY OF TOBACCO USE: ICD-10-CM

## 2023-12-07 DIAGNOSIS — E11.9 CONTROLLED TYPE 2 DIABETES MELLITUS WITHOUT COMPLICATION, WITHOUT LONG-TERM CURRENT USE OF INSULIN (HCC): ICD-10-CM

## 2023-12-07 LAB
ALCOHOL URINE: NORMAL
AMPHETAMINE SCREEN, URINE: NORMAL
BARBITURATE SCREEN, URINE: NORMAL
BENZODIAZEPINE SCREEN, URINE: NORMAL
BUPRENORPHINE URINE: NORMAL
COCAINE METABOLITE SCREEN URINE: NORMAL
FENTANYL SCREEN, URINE: NORMAL
GABAPENTIN SCREEN, URINE: NORMAL
HBA1C MFR BLD: 6.8 %
MDMA URINE: NORMAL
METHADONE SCREEN, URINE: NORMAL
METHAMPHETAMINE, URINE: NORMAL
OPIATE SCREEN URINE: NORMAL
OXYCODONE SCREEN URINE: NORMAL
PHENCYCLIDINE SCREEN URINE: NORMAL
PROPOXYPHENE SCREEN, URINE: NORMAL
SYNTHETIC CANNABINOIDS(K2) SCREEN, URINE: NORMAL
THC SCREEN, URINE: NORMAL
TRAMADOL SCREEN URINE: NORMAL
TRICYCLIC ANTIDEPRESSANTS, UR: NORMAL

## 2023-12-07 PROCEDURE — G0296 VISIT TO DETERM LDCT ELIG: HCPCS | Performed by: FAMILY MEDICINE

## 2023-12-07 PROCEDURE — 83036 HEMOGLOBIN GLYCOSYLATED A1C: CPT | Performed by: FAMILY MEDICINE

## 2023-12-07 PROCEDURE — 1123F ACP DISCUSS/DSCN MKR DOCD: CPT | Performed by: FAMILY MEDICINE

## 2023-12-07 PROCEDURE — 90694 VACC AIIV4 NO PRSRV 0.5ML IM: CPT | Performed by: FAMILY MEDICINE

## 2023-12-07 PROCEDURE — G0439 PPPS, SUBSEQ VISIT: HCPCS | Performed by: FAMILY MEDICINE

## 2023-12-07 PROCEDURE — G0008 ADMIN INFLUENZA VIRUS VAC: HCPCS | Performed by: FAMILY MEDICINE

## 2023-12-07 PROCEDURE — 80305 DRUG TEST PRSMV DIR OPT OBS: CPT | Performed by: FAMILY MEDICINE

## 2023-12-07 PROCEDURE — 3044F HG A1C LEVEL LT 7.0%: CPT | Performed by: FAMILY MEDICINE

## 2023-12-07 RX ORDER — ATORVASTATIN CALCIUM 20 MG/1
20 TABLET, FILM COATED ORAL NIGHTLY
Qty: 90 TABLET | Refills: 3 | Status: SHIPPED | OUTPATIENT
Start: 2023-12-07

## 2023-12-07 RX ORDER — FUROSEMIDE 40 MG/1
40 TABLET ORAL DAILY
Qty: 90 TABLET | Refills: 3 | Status: SHIPPED | OUTPATIENT
Start: 2023-12-07

## 2023-12-07 RX ORDER — ALBUTEROL SULFATE 90 UG/1
2 AEROSOL, METERED RESPIRATORY (INHALATION) EVERY 6 HOURS PRN
Qty: 8.5 G | Refills: 5 | Status: SHIPPED | OUTPATIENT
Start: 2023-12-07

## 2023-12-07 RX ORDER — OXYCODONE HYDROCHLORIDE AND ACETAMINOPHEN 5; 325 MG/1; MG/1
1 TABLET ORAL EVERY 6 HOURS PRN
Qty: 60 TABLET | Refills: 0 | Status: SHIPPED | OUTPATIENT
Start: 2023-12-07 | End: 2024-01-06

## 2023-12-07 ASSESSMENT — PATIENT HEALTH QUESTIONNAIRE - PHQ9
SUM OF ALL RESPONSES TO PHQ9 QUESTIONS 1 & 2: 0
SUM OF ALL RESPONSES TO PHQ QUESTIONS 1-9: 0
2. FEELING DOWN, DEPRESSED OR HOPELESS: 0
SUM OF ALL RESPONSES TO PHQ QUESTIONS 1-9: 0
SUM OF ALL RESPONSES TO PHQ QUESTIONS 1-9: 0
1. LITTLE INTEREST OR PLEASURE IN DOING THINGS: 0
SUM OF ALL RESPONSES TO PHQ QUESTIONS 1-9: 0

## 2023-12-07 ASSESSMENT — LIFESTYLE VARIABLES
HOW MANY STANDARD DRINKS CONTAINING ALCOHOL DO YOU HAVE ON A TYPICAL DAY: 1 OR 2
HOW OFTEN DO YOU HAVE A DRINK CONTAINING ALCOHOL: MONTHLY OR LESS

## 2023-12-07 NOTE — PROGRESS NOTES
Does not apply route daily Right and left knee high compression stockings. 20-30 mmHg. To be worn continuously throughout the day. Yes JEREMY Lou   Multiple Vitamins-Minerals (CENTRUM SILVER PO) Take by mouth Yes Provider, MD Ilana   vitamin D 25 MCG (1000 UT) CAPS Take 2 capsules by mouth daily Yes Marc Reynolds MD   benazepril (LOTENSIN) 20 MG tablet TAKE 1 TABLET BY MOUTH EVERY DAY  Marc Reynolds MD       [facility] (Including outside providers/suppliers regularly involved in providing care):   Patient Care Team:  Marc Reynolds MD as PCP - General (Family Medicine)  Marc Reynolds MD as PCP - [facility]     Reviewed and updated this visit:  Tobacco  Allergies  Meds  Med Hx  Surg Hx  Soc Hx  Fam Hx         Urine drug screen today  Labs ordered today  OARRS reviewed   Current MME 30  Risk of side affects and addiction to percocet discussed. Restart Lasix 40 mg daily for edema  Flu shot today  Patient advised per state medical board guidelines, can no longer continue to write pain medication. If his back pain should continue to be enough of an issue, would refer to pain management. Will decrease Percocet to 5/325 twice daily for 1 month then DC  If his left hip continues to be more the issue, will refer to orthopedic surgery for second opinion and possible injection. Patient strongly encouraged to quit smoking. OARRS reviewed. Current MME 30  Risk of addiction and tolerance to medications was discussed as well as possible side effects including constipation.

## 2023-12-15 RX ORDER — TRAZODONE HYDROCHLORIDE 50 MG/1
TABLET ORAL
Qty: 90 TABLET | Refills: 3 | Status: SHIPPED | OUTPATIENT
Start: 2023-12-15

## 2024-01-03 ENCOUNTER — TELEPHONE (OUTPATIENT)
Dept: PRIMARY CARE CLINIC | Age: 74
End: 2024-01-03

## 2024-01-03 DIAGNOSIS — M96.1 POST LAMINECTOMY SYNDROME: ICD-10-CM

## 2024-01-03 DIAGNOSIS — M54.16 LUMBAR RADICULAR PAIN: ICD-10-CM

## 2024-01-03 DIAGNOSIS — M96.1 POST LAMINECTOMY SYNDROME: Primary | ICD-10-CM

## 2024-01-03 RX ORDER — OXYCODONE HYDROCHLORIDE AND ACETAMINOPHEN 5; 325 MG/1; MG/1
1 TABLET ORAL DAILY PRN
Qty: 30 TABLET | Refills: 0 | Status: SHIPPED | OUTPATIENT
Start: 2024-01-03 | End: 2024-02-02

## 2024-01-03 RX ORDER — GABAPENTIN 300 MG/1
CAPSULE ORAL
Qty: 360 CAPSULE | Refills: 1 | Status: SHIPPED | OUTPATIENT
Start: 2024-01-03 | End: 2024-03-26

## 2024-01-03 NOTE — TELEPHONE ENCOUNTER
LAST VISIT:   12/7/2023     Future Appointments   Date Time Provider Department Center   3/14/2024 10:00 AM Graham Jose MD [facility] in [place]

## 2024-01-03 NOTE — TELEPHONE ENCOUNTER
Patient called and asked if you could order a Xray for his low back pain. Patient said this is not new and has been dealing with this for a while. Patients last OV was 12/7/23. I do not see anything available for appt with you, if appt is needed is it okay to schedule with diff provider?     If XR is ordered he asked if it could please be faxed to [facility].

## 2024-01-04 ENCOUNTER — TELEPHONE (OUTPATIENT)
Dept: PRIMARY CARE CLINIC | Age: 74
End: 2024-01-04

## 2024-01-04 NOTE — TELEPHONE ENCOUNTER
The pharmacy is reporting that patient thinks this med should be taken three times daily and the most recent script says once daily. Please confirm.    The previous script was picked up on 12/13 so if you agree with patient picking up the most recent scrip 1 week early (due 01/11) please confirm so we can call the pharmacy.

## 2024-01-05 NOTE — TELEPHONE ENCOUNTER
Per previous note, patient was to decrease his Percocet to twice daily for 1 month then once daily for 1 month then DC.  If his pain continued, he was to let us know and would refer to pain management.  Okay to fill prescription for once daily this last time.  Advised patient of the above.  May fill 1 week early.  Advised pharmacy then medication is discontinued

## 2024-01-10 NOTE — TELEPHONE ENCOUNTER
Call placed, discussed message with patient who expressed understanding. Patient will have the scan done at a [facility] facility.

## 2024-03-14 ENCOUNTER — OFFICE VISIT (OUTPATIENT)
Dept: PRIMARY CARE CLINIC | Age: 74
End: 2024-03-14

## 2024-03-14 VITALS
SYSTOLIC BLOOD PRESSURE: 136 MMHG | WEIGHT: 295.2 LBS | DIASTOLIC BLOOD PRESSURE: 78 MMHG | HEART RATE: 101 BPM | BODY MASS INDEX: 47.44 KG/M2 | OXYGEN SATURATION: 95 % | HEIGHT: 66 IN

## 2024-03-14 DIAGNOSIS — Z00.00 ANNUAL PHYSICAL EXAM: ICD-10-CM

## 2024-03-14 DIAGNOSIS — J44.9 CHRONIC OBSTRUCTIVE PULMONARY DISEASE, UNSPECIFIED COPD TYPE (HCC): ICD-10-CM

## 2024-03-14 DIAGNOSIS — Z13.6 ENCOUNTER FOR LIPID SCREENING FOR CARDIOVASCULAR DISEASE: ICD-10-CM

## 2024-03-14 DIAGNOSIS — R60.0 LOCALIZED EDEMA: ICD-10-CM

## 2024-03-14 DIAGNOSIS — Z12.5 SCREENING PSA (PROSTATE SPECIFIC ANTIGEN): ICD-10-CM

## 2024-03-14 DIAGNOSIS — F33.0 MAJOR DEPRESSIVE DISORDER, RECURRENT, MILD (HCC): ICD-10-CM

## 2024-03-14 DIAGNOSIS — R60.0 LOWER EXTREMITY EDEMA: ICD-10-CM

## 2024-03-14 DIAGNOSIS — Z13.220 ENCOUNTER FOR LIPID SCREENING FOR CARDIOVASCULAR DISEASE: ICD-10-CM

## 2024-03-14 DIAGNOSIS — E11.9 CONTROLLED TYPE 2 DIABETES MELLITUS WITHOUT COMPLICATION, WITHOUT LONG-TERM CURRENT USE OF INSULIN (HCC): Primary | ICD-10-CM

## 2024-03-14 PROBLEM — E08.65 DIABETES MELLITUS DUE TO UNDERLYING CONDITION WITH HYPERGLYCEMIA, WITHOUT LONG-TERM CURRENT USE OF INSULIN (HCC): Status: RESOLVED | Noted: 2023-01-03 | Resolved: 2024-03-14

## 2024-03-14 LAB — HBA1C MFR BLD: 6.3 %

## 2024-03-14 RX ORDER — FUROSEMIDE 40 MG/1
40 TABLET ORAL 2 TIMES DAILY
Qty: 180 TABLET | Refills: 3 | Status: SHIPPED | OUTPATIENT
Start: 2024-03-14

## 2024-03-14 SDOH — ECONOMIC STABILITY: FOOD INSECURITY: WITHIN THE PAST 12 MONTHS, YOU WORRIED THAT YOUR FOOD WOULD RUN OUT BEFORE YOU GOT MONEY TO BUY MORE.: NEVER TRUE

## 2024-03-14 SDOH — ECONOMIC STABILITY: FOOD INSECURITY: WITHIN THE PAST 12 MONTHS, THE FOOD YOU BOUGHT JUST DIDN'T LAST AND YOU DIDN'T HAVE MONEY TO GET MORE.: NEVER TRUE

## 2024-03-14 SDOH — ECONOMIC STABILITY: INCOME INSECURITY: HOW HARD IS IT FOR YOU TO PAY FOR THE VERY BASICS LIKE FOOD, HOUSING, MEDICAL CARE, AND HEATING?: NOT HARD AT ALL

## 2024-03-14 ASSESSMENT — PATIENT HEALTH QUESTIONNAIRE - PHQ9
3. TROUBLE FALLING OR STAYING ASLEEP: 0
5. POOR APPETITE OR OVEREATING: 0
2. FEELING DOWN, DEPRESSED OR HOPELESS: 0
1. LITTLE INTEREST OR PLEASURE IN DOING THINGS: 0
6. FEELING BAD ABOUT YOURSELF - OR THAT YOU ARE A FAILURE OR HAVE LET YOURSELF OR YOUR FAMILY DOWN: 0
4. FEELING TIRED OR HAVING LITTLE ENERGY: 0
7. TROUBLE CONCENTRATING ON THINGS, SUCH AS READING THE NEWSPAPER OR WATCHING TELEVISION: 0
SUM OF ALL RESPONSES TO PHQ QUESTIONS 1-9: 0
9. THOUGHTS THAT YOU WOULD BE BETTER OFF DEAD, OR OF HURTING YOURSELF: 0
8. MOVING OR SPEAKING SO SLOWLY THAT OTHER PEOPLE COULD HAVE NOTICED. OR THE OPPOSITE, BEING SO FIGETY OR RESTLESS THAT YOU HAVE BEEN MOVING AROUND A LOT MORE THAN USUAL: 0
SUM OF ALL RESPONSES TO PHQ9 QUESTIONS 1 & 2: 0
SUM OF ALL RESPONSES TO PHQ QUESTIONS 1-9: 0

## 2024-03-14 ASSESSMENT — ENCOUNTER SYMPTOMS
SHORTNESS OF BREATH: 0
WHEEZING: 0
NAUSEA: 0
EYE DISCHARGE: 0
COUGH: 0
RHINORRHEA: 0
ABDOMINAL PAIN: 0
VOMITING: 0
EYE REDNESS: 0
SORE THROAT: 0
DIARRHEA: 0

## 2024-03-14 NOTE — PROGRESS NOTES
7. Body mass index (BMI) 45.0-49.9, adult (HCC)        8. Localized edema  Echo (TTE) complete (PRN contrast/bubble/strain/3D)    AFL (Epic) - Carlton Funk DO, Cardiology, [place]    CBC with Auto Differential      9. Lower extremity edema  furosemide (LASIX) 40 MG tablet           Plan:    Reprint order for low-dose CT chest  Increase Lasix to 40 mg twice daily  Blood work ordered  Echo ordered  Referral to cardiology for second opinion on what else can be done for the edema.  Patient was tried on vascular stockings but states cannot get them on.    Return in about 3 months (around 6/14/2024).    Orders Placed This Encounter   Procedures    Lipid, Fasting     Standing Status:   Future     Standing Expiration Date:   6/14/2024    Basic Metabolic Panel, Fasting     Standing Status:   Future     Standing Expiration Date:   6/14/2024    Hepatic Function Panel     Standing Status:   Future     Standing Expiration Date:   6/14/2024    PSA Screening     Standing Status:   Future     Standing Expiration Date:   6/14/2024    CBC with Auto Differential     Standing Status:   Future     Standing Expiration Date:   3/14/2025    AFL (Epic) - Carlton Funk DO, Cardiology, [place]     Referral Priority:   Routine     Referral Type:   Eval and Treat     Referral Reason:   Specialty Services Required     Referred to Provider:   Carlton Funk DO     Requested Specialty:   Cardiology     Number of Visits Requested:   1    POCT glycosylated hemoglobin (Hb A1C)     Orders Placed This Encounter   Medications    furosemide (LASIX) 40 MG tablet     Sig: Take 1 tablet by mouth 2 times daily     Dispense:  180 tablet     Refill:  3       Patient given educational materials - see patient instructions.  Discussed use, benefit, and side effects of prescribed medications.  All patient questions answered. Pt voiced understanding.Reviewed health maintenance.  Instructed to continue current medications, diet andexercise.  Patient agreed with

## 2024-03-18 DIAGNOSIS — E11.9 CONTROLLED TYPE 2 DIABETES MELLITUS WITHOUT COMPLICATION, WITHOUT LONG-TERM CURRENT USE OF INSULIN (HCC): ICD-10-CM

## 2024-03-18 DIAGNOSIS — J44.9 CHRONIC OBSTRUCTIVE PULMONARY DISEASE, UNSPECIFIED COPD TYPE (HCC): ICD-10-CM

## 2024-03-18 RX ORDER — HYDROCHLOROTHIAZIDE 50 MG/1
50 TABLET ORAL DAILY
Qty: 90 TABLET | Refills: 0 | Status: SHIPPED | OUTPATIENT
Start: 2024-03-18

## 2024-03-18 RX ORDER — CELECOXIB 200 MG/1
200 CAPSULE ORAL DAILY
Qty: 90 CAPSULE | Refills: 3 | Status: SHIPPED | OUTPATIENT
Start: 2024-03-18

## 2024-03-18 RX ORDER — ATORVASTATIN CALCIUM 20 MG/1
20 TABLET, FILM COATED ORAL NIGHTLY
Qty: 90 TABLET | Refills: 3 | Status: SHIPPED | OUTPATIENT
Start: 2024-03-18

## 2024-03-18 RX ORDER — TRAZODONE HYDROCHLORIDE 50 MG/1
TABLET ORAL
Qty: 90 TABLET | Refills: 3 | Status: SHIPPED | OUTPATIENT
Start: 2024-03-18

## 2024-03-20 ENCOUNTER — TELEPHONE (OUTPATIENT)
Dept: ONCOLOGY | Age: 74
End: 2024-03-20

## 2024-04-02 ENCOUNTER — HOSPITAL ENCOUNTER (OUTPATIENT)
Dept: CT IMAGING | Age: 74
Discharge: HOME | End: 2024-04-04
Attending: FAMILY MEDICINE
Payer: MEDICARE

## 2024-04-02 ENCOUNTER — HOSPITAL ENCOUNTER (OUTPATIENT)
Age: 74
Discharge: HOME | End: 2024-04-04
Attending: FAMILY MEDICINE
Payer: MEDICARE

## 2024-04-02 VITALS — BODY MASS INDEX: 47.41 KG/M2 | HEIGHT: 66 IN | WEIGHT: 295 LBS

## 2024-04-02 DIAGNOSIS — Z87.891 PERSONAL HISTORY OF TOBACCO USE: ICD-10-CM

## 2024-04-02 DIAGNOSIS — R60.0 LOCALIZED EDEMA: ICD-10-CM

## 2024-04-02 LAB
ECHO AO ROOT DIAM: 3.7 CM
ECHO AO ROOT INDEX: 1.57 CM/M2
ECHO AV MEAN GRADIENT: 4 MMHG
ECHO AV MEAN VELOCITY: 1 M/S
ECHO AV PEAK GRADIENT: 7 MMHG
ECHO AV PEAK VELOCITY: 1.3 M/S
ECHO AV VELOCITY RATIO: 0.69
ECHO AV VTI: 26.1 CM
ECHO BSA: 2.5 M2
ECHO LA AREA 2C: 15.8 CM2
ECHO LA AREA 4C: 20.1 CM2
ECHO LA DIAMETER INDEX: 1.95 CM/M2
ECHO LA DIAMETER: 4.6 CM
ECHO LA MAJOR AXIS: 5.6 CM
ECHO LA MINOR AXIS: 4.7 CM
ECHO LA TO AORTIC ROOT RATIO: 1.24
ECHO LA VOL BP: 53 ML (ref 18–58)
ECHO LA VOL MOD A2C: 42 ML (ref 18–58)
ECHO LA VOL MOD A4C: 57 ML (ref 18–58)
ECHO LA VOL/BSA BIPLANE: 22 ML/M2 (ref 16–34)
ECHO LA VOLUME INDEX MOD A2C: 18 ML/M2 (ref 16–34)
ECHO LA VOLUME INDEX MOD A4C: 24 ML/M2 (ref 16–34)
ECHO LV E' LATERAL VELOCITY: 6 CM/S
ECHO LV E' SEPTAL VELOCITY: 6 CM/S
ECHO LV FRACTIONAL SHORTENING: 39 % (ref 28–44)
ECHO LV INTERNAL DIMENSION DIASTOLE INDEX: 2.29 CM/M2
ECHO LV INTERNAL DIMENSION DIASTOLIC: 5.4 CM (ref 4.2–5.9)
ECHO LV INTERNAL DIMENSION SYSTOLIC INDEX: 1.4 CM/M2
ECHO LV INTERNAL DIMENSION SYSTOLIC: 3.3 CM
ECHO LV IVSD: 1 CM (ref 0.6–1)
ECHO LV MASS 2D: 206.7 G (ref 88–224)
ECHO LV MASS INDEX 2D: 87.6 G/M2 (ref 49–115)
ECHO LV POSTERIOR WALL DIASTOLIC: 1 CM (ref 0.6–1)
ECHO LV RELATIVE WALL THICKNESS RATIO: 0.37
ECHO LVOT AREA: 4.2 CM2
ECHO LVOT AV VTI INDEX: 0.69
ECHO LVOT DIAM: 2.3 CM
ECHO LVOT MEAN GRADIENT: 2 MMHG
ECHO LVOT PEAK GRADIENT: 3 MMHG
ECHO LVOT PEAK VELOCITY: 0.9 M/S
ECHO LVOT STROKE VOLUME INDEX: 31.8 ML/M2
ECHO LVOT SV: 75.2 ML
ECHO LVOT VTI: 18.1 CM
ECHO MV A VELOCITY: 0.6 M/S
ECHO MV E VELOCITY: 0.47 M/S
ECHO MV E/A RATIO: 0.78
ECHO MV E/E' LATERAL: 7.83
ECHO MV E/E' RATIO (AVERAGED): 7.83
ECHO RV BASAL DIMENSION: 3.5 CM
ECHO RV FREE WALL PEAK S': 15 CM/S

## 2024-04-02 PROCEDURE — 71271 CT THORAX LUNG CANCER SCR C-: CPT

## 2024-04-02 PROCEDURE — 93306 TTE W/DOPPLER COMPLETE: CPT | Performed by: INTERNAL MEDICINE

## 2024-04-02 PROCEDURE — 93306 TTE W/DOPPLER COMPLETE: CPT

## 2024-04-16 NOTE — PROGRESS NOTES
[facility] PRIMARY CARE  [address] [address]  Dept: 700 [facility] Olivier is a 67 y.o. male Established patient, who presents today for his medical conditions/complaints as noted below. Chief Complaint   Patient presents with    Diabetes       HPI:     HPI  Pt here for diabetes. No low blood sugar reactions. Pt states needs letter stating he can drive on percocet. Has had no sedation. States not able to ambulate without them. Patient not get blood work done as ordered. Patient denies any difficulty breathing. No chest heaviness or pressure sensations. Patient states currently not trying to lose weight at this time. Reviewed prior notes None  Reviewed previous Labs    LDL Cholesterol (mg/dL)   Date Value   01/11/2021 25     LDL Calculated (mg/dL)   Date Value   01/12/2018 65       (goal LDL is <100)   AST (U/L)   Date Value   03/27/2022 24     ALT (U/L)   Date Value   03/27/2022 28     BUN (mg/dL)   Date Value   03/27/2022 11     Hemoglobin A1C (%)   Date Value   05/10/2022 6.4     TSH (mIU/L)   Date Value   01/03/2020 1.78     BP Readings from Last 3 Encounters:   05/10/22 134/70   04/24/22 (!) 149/93   03/27/22 (!) 108/94          (goal 120/80)    Past Medical History:   Diagnosis Date    Arthritis     [facility]     Diabetes mellitus ([facility]     GURPREET on CPAP       Past Surgical History:   Procedure Laterality Date    APPENDECTOMY  1952    CHOLECYSTECTOMY, LAPAROSCOPIC  1996    JOINT REPLACEMENT Right 11/07/2017    LUMBAR LAMINECTOMY  10/24/2018    L2-L5 laminectomy, foraminotomy    SPINAL CORD STIMULATOR SURGERY Right 05/2021    TONSILLECTOMY  1959    TOTAL KNEE ARTHROPLASTY Left 02/01/2017       No family history on file.     Social History     Tobacco Use    Smoking status: Current Every Day Smoker     Packs/day: 1.00     Years: 42.00     Pack years: 42.00     Types: Cigarettes     Last attempt to quit: 10/12/2017     Years since quittin.5    Smokeless tobacco: Never Used   Substance Use Topics    Alcohol use: No      Current Outpatient Medications   Medication Sig Dispense Refill    fluocinonide (LIDEX) 0.05 % cream apply topically twice a day as needed 60 g 0    mupirocin (BACTROBAN) 2 % cream Apply 2 times daily. 30 g 2    oxyCODONE-acetaminophen (PERCOCET)  MG per tablet Take 1 tablet by mouth every 8 hours as needed for Pain for up to 30 days. 90 tablet 0    celecoxib (CELEBREX) 200 MG capsule TAKE 1 CAPSULE BY MOUTH EVERY DAY 30 capsule 5    hydroCHLOROthiazide (HYDRODIURIL) 25 MG tablet 1 tablet      Multiple Vitamins-Minerals (CENTRUM SILVER PO) Take by mouth      budesonide-formoterol (SYMBICORT) 160-4.5 MCG/ACT AERO Inhale 2 puffs into the lungs 2 times daily 10.2 g 5    furosemide (LASIX) 20 MG tablet Take 1 tablet by mouth 2 times daily 180 tablet 3    metFORMIN (GLUCOPHAGE) 1000 MG tablet Take 1 tablet by mouth daily (with breakfast) 90 tablet 3    atorvastatin (LIPITOR) 20 MG tablet Take 1 tablet by mouth nightly 90 tablet 3    hydroCHLOROthiazide (HYDRODIURIL) 50 MG tablet Take 1 tablet by mouth daily 90 tablet 3    benazepril (LOTENSIN) 20 MG tablet Take 1 tablet by mouth daily 90 tablet 2    vitamin D 25 MCG (1000 UT) CAPS Take 2 capsules by mouth daily 60 capsule 5    albuterol sulfate HFA (PROAIR HFA) 108 (90 Base) MCG/ACT inhaler Inhale 2 puffs into the lungs every 6 hours as needed for Wheezing 8.5 g 5    ipratropium-albuterol (DUONEB) 0.5-2.5 (3) MG/3ML SOLN nebulizer solution Inhale 3 mLs into the lungs every 6 hours as needed for Shortness of Breath 360 mL 1    gabapentin (NEURONTIN) 300 MG capsule Take 1 capsule by mouth 4 times daily for 30 days. 360 capsule 1     No current facility-administered medications for this visit.      Allergies   Allergen Reactions    Cheratussin Ac [Guaifenesin-Codeine] Other (See Comments) Coughing Attack. Health Maintenance   Topic Date Due    Diabetic retinal exam  Never done    Shingles vaccine (1 of 2) Never done    Lipids  01/11/2022    Diabetic foot exam  07/28/2022    Low dose CT lung screening  12/29/2022    Diabetic microalbuminuria test  02/01/2023    Depression Screen  02/01/2023    Potassium  03/27/2023    Creatinine  03/27/2023    A1C test (Diabetic or Prediabetic)  05/10/2023    Colorectal Cancer Screen  11/15/2024    DTaP/Tdap/Td vaccine (3 - Td or Tdap) 07/27/2027    Flu vaccine  Completed    Pneumococcal 65+ years Vaccine  Completed    COVID-19 Vaccine  Completed    AAA screen  Completed    Hepatitis C screen  Completed    Hepatitis A vaccine  Aged Out    Hib vaccine  Aged Out    Meningococcal (ACWY) vaccine  Aged Out       Subjective:      Review of Systems   Constitutional: Negative for chills and fever. HENT: Negative for rhinorrhea and sore throat. Eyes: Negative for discharge and redness. Respiratory: Negative for cough, shortness of breath and wheezing. Cardiovascular: Negative for chest pain and palpitations. Gastrointestinal: Negative for abdominal pain, diarrhea, nausea and vomiting. Genitourinary: Negative for dysuria and frequency. Musculoskeletal: Negative for arthralgias and myalgias. Neurological: Negative for dizziness, light-headedness and headaches. Psychiatric/Behavioral: Negative for sleep disturbance. Objective:     /70   Pulse 84   Ht 5' 6.96\" (1.701 m)   Wt 274 lb 3.2 oz (124.4 kg)   SpO2 92%   BMI 43.00 kg/m²   Physical Exam  Vitals and nursing note reviewed. Constitutional:       General: He is not in acute distress. Appearance: He is well-developed. He is not ill-appearing. HENT:      Head: Normocephalic and atraumatic. Right Ear: External ear normal.      Left Ear: External ear normal.   Eyes:      General: No scleral icterus. Right eye: No discharge.          Left eye: No discharge. Conjunctiva/sclera: Conjunctivae normal.   Neck:      Thyroid: No thyromegaly. Trachea: No tracheal deviation. Cardiovascular:      Rate and Rhythm: Normal rate and regular rhythm. Heart sounds: Normal heart sounds. Pulmonary:      Effort: Pulmonary effort is normal. No respiratory distress. Breath sounds: Normal breath sounds. No wheezing. Lymphadenopathy:      Cervical: No cervical adenopathy. Skin:     General: Skin is warm. Findings: No rash. Neurological:      Mental Status: He is alert and oriented to person, place, and time. Psychiatric:         Mood and Affect: Mood normal.         Behavior: Behavior normal.         Thought Content: Thought content normal.       Controlled Substance Monitoring:    Acute and Chronic Pain Monitoring:   RX Monitoring 5/10/2022   Attestation -   Acute Pain Prescriptions -   Periodic Controlled Substance Monitoring Possible medication side effects, risk of tolerance/dependence & alternative treatments discussed. ;No signs of potential drug abuse or diversion identified. Chronic Pain > 50 MEDD Obtained or confirmed \"Consent for Opioid Use\" on file. Chronic Pain > 80 MEDD -   Chronic Pain > 120 MEDD Obtained or confirmed \"Medication Contract\" on file. Assessment:       Diagnosis Orders   1. Diabetes mellitus due to underlying condition with hyperglycemia, without long-term current use of insulin (HCC)   TSH   2. Obesity, morbid, BMI 40.0-49.9 ([facility])  TSH   3. Post laminectomy syndrome     4. Spondylolisthesis at L4-L5 level     5. Controlled type 2 diabetes mellitus without complication, without long-term current use of insulin (HCC)  POCT glycosylated hemoglobin (Hb A1C)   6. Encounter for lipid screening for cardiovascular disease  Lipid, Fasting   7. Annual physical exam  Basic Metabolic Panel, Fasting    Hepatic Function Panel   8.  Screening PSA (prostate specific antigen)  PSA Screening        Plan:    Blood work ordered  Letter given for driving while on Percocet  Sinew present medications as is  Patient desires no other treatment for his chronic back pain at this time  Weight loss options discussed    Return in about 3 months (around 8/10/2022). Orders Placed This Encounter   Procedures    Lipid, Fasting     Standing Status:   Future     Standing Expiration Date:   8/10/2022    Basic Metabolic Panel, Fasting     Standing Status:   Future     Standing Expiration Date:   8/10/2022    Hepatic Function Panel     Standing Status:   Future     Standing Expiration Date:   8/10/2022    PSA Screening     Standing Status:   Future     Standing Expiration Date:   8/10/2022    TSH     Standing Status:   Future     Standing Expiration Date:   5/10/2023    POCT glycosylated hemoglobin (Hb A1C)     Orders Placed This Encounter   Medications    fluocinonide (LIDEX) 0.05 % cream     Sig: apply topically twice a day as needed     Dispense:  60 g     Refill:  0    mupirocin (BACTROBAN) 2 % cream     Sig: Apply 2 times daily. Dispense:  30 g     Refill:  2       Patient given educational materials - see patient instructions. Discussed use, benefit, and side effects of prescribed medications. All patient questions answered. Pt voiced understanding. Reviewed health maintenance. Instructed to continue current medications, diet andexercise. Patient agreed with treatment plan. Follow up as directed.      Electronicallysigned by Maggie Manley MD on 5/10/2022 at 4:28 PM yes

## 2024-05-06 DIAGNOSIS — J44.9 CHRONIC OBSTRUCTIVE PULMONARY DISEASE, UNSPECIFIED COPD TYPE (HCC): ICD-10-CM

## 2024-05-06 RX ORDER — HYDROCHLOROTHIAZIDE 50 MG/1
50 TABLET ORAL DAILY
Qty: 90 TABLET | Refills: 0 | Status: SHIPPED | OUTPATIENT
Start: 2024-05-06

## 2024-05-06 RX ORDER — CELECOXIB 200 MG/1
200 CAPSULE ORAL DAILY
Qty: 90 CAPSULE | Refills: 3 | Status: SHIPPED | OUTPATIENT
Start: 2024-05-06

## 2024-05-07 DIAGNOSIS — Z12.5 SCREENING PSA (PROSTATE SPECIFIC ANTIGEN): ICD-10-CM

## 2024-05-07 DIAGNOSIS — Z00.00 ANNUAL PHYSICAL EXAM: ICD-10-CM

## 2024-05-07 DIAGNOSIS — R60.0 LOCALIZED EDEMA: ICD-10-CM

## 2024-05-07 DIAGNOSIS — Z13.6 ENCOUNTER FOR LIPID SCREENING FOR CARDIOVASCULAR DISEASE: ICD-10-CM

## 2024-05-07 DIAGNOSIS — Z13.220 ENCOUNTER FOR LIPID SCREENING FOR CARDIOVASCULAR DISEASE: ICD-10-CM

## 2024-05-07 LAB
ALBUMIN/GLOBULIN RATIO: 1 (ref 1–2.5)
ALBUMIN: 4.3 G/DL (ref 3.5–5.2)
ALP BLD-CCNC: 62 U/L (ref 40–129)
ALT SERPL-CCNC: 26 U/L (ref 10–50)
ANION GAP SERPL CALCULATED.3IONS-SCNC: 13 MMOL/L (ref 9–16)
AST SERPL-CCNC: 33 U/L (ref 10–50)
BASOPHILS ABSOLUTE: 0.07 K/UL (ref 0–0.2)
BASOPHILS RELATIVE PERCENT: 1 % (ref 0–2)
BILIRUB SERPL-MCNC: 0.5 MG/DL (ref 0–1.2)
BILIRUBIN DIRECT: <0.2 MG/DL (ref 0–0.3)
BILIRUBIN, INDIRECT: NORMAL MG/DL (ref 0–1)
BUN BLDV-MCNC: 11 MG/DL (ref 8–23)
CALCIUM SERPL-MCNC: 9.4 MG/DL (ref 8.6–10.4)
CHLORIDE BLD-SCNC: 98 MMOL/L (ref 98–107)
CHOLESTEROL, FASTING: 95 MG/DL (ref 0–199)
CHOLESTEROL/HDL RATIO: 3
CO2: 27 MMOL/L (ref 20–31)
CREAT SERPL-MCNC: 0.9 MG/DL (ref 0.7–1.2)
EOSINOPHILS ABSOLUTE: 0.22 K/UL (ref 0–0.44)
EOSINOPHILS RELATIVE PERCENT: 2 % (ref 1–4)
GFR, ESTIMATED: >90 ML/MIN/1.73M2
GLOBULIN: 3 G/DL
GLUCOSE FASTING: 141 MG/DL (ref 74–99)
HCT VFR BLD CALC: 51.1 % (ref 40.7–50.3)
HDLC SERPL-MCNC: 34 MG/DL
HEMOGLOBIN: 16.9 G/DL (ref 13–17)
IMMATURE GRANULOCYTES %: 1 %
IMMATURE GRANULOCYTES ABSOLUTE: 0.05 K/UL (ref 0–0.3)
LDL CHOLESTEROL: 21 MG/DL (ref 0–100)
LYMPHOCYTES ABSOLUTE: 2.73 K/UL (ref 1.1–3.7)
LYMPHOCYTES RELATIVE PERCENT: 26 % (ref 24–43)
MCH RBC QN AUTO: 31.3 PG (ref 25.2–33.5)
MCHC RBC AUTO-ENTMCNC: 33.1 G/DL (ref 28.4–34.8)
MCV RBC AUTO: 94.6 FL (ref 82.6–102.9)
MONOCYTES ABSOLUTE: 1.38 K/UL (ref 0.1–1.2)
MONOCYTES RELATIVE PERCENT: 13 % (ref 3–12)
NEUTROPHILS ABSOLUTE: 6.17 K/UL (ref 1.5–8.1)
NEUTROPHILS RELATIVE PERCENT: 57 % (ref 36–65)
NRBC AUTOMATED: 0 PER 100 WBC
PDW BLD-RTO: 13.7 % (ref 11.8–14.4)
PLATELET # BLD: 265 K/UL (ref 138–453)
PMV BLD AUTO: 9.6 FL (ref 8.1–13.5)
POTASSIUM SERPL-SCNC: 4.6 MMOL/L (ref 3.7–5.3)
PROSTATE SPECIFIC ANTIGEN: 0.4 NG/ML (ref 0–4)
RBC # BLD: 5.4 M/UL (ref 4.21–5.77)
SODIUM BLD-SCNC: 138 MMOL/L (ref 136–145)
TOTAL PROTEIN: 7.3 G/DL (ref 6.6–8.7)
TRIGLYCERIDE, FASTING: 200 MG/DL (ref 0–149)
VLDLC SERPL CALC-MCNC: 40 MG/DL
WBC # BLD: 10.6 K/UL (ref 3.5–11.3)

## 2024-06-27 ENCOUNTER — OFFICE VISIT (OUTPATIENT)
Dept: PRIMARY CARE CLINIC | Age: 74
End: 2024-06-27

## 2024-06-27 VITALS
SYSTOLIC BLOOD PRESSURE: 136 MMHG | DIASTOLIC BLOOD PRESSURE: 82 MMHG | HEIGHT: 67 IN | WEIGHT: 292.2 LBS | HEART RATE: 93 BPM | OXYGEN SATURATION: 93 % | BODY MASS INDEX: 45.86 KG/M2

## 2024-06-27 DIAGNOSIS — E11.9 CONTROLLED TYPE 2 DIABETES MELLITUS WITHOUT COMPLICATION, WITHOUT LONG-TERM CURRENT USE OF INSULIN (HCC): Primary | ICD-10-CM

## 2024-06-27 DIAGNOSIS — M25.552 PAIN OF LEFT HIP: ICD-10-CM

## 2024-06-27 DIAGNOSIS — J44.9 CHRONIC OBSTRUCTIVE PULMONARY DISEASE, UNSPECIFIED COPD TYPE (HCC): ICD-10-CM

## 2024-06-27 DIAGNOSIS — Z72.0 TOBACCO ABUSE DISORDER: ICD-10-CM

## 2024-06-27 DIAGNOSIS — R60.0 LOCALIZED EDEMA: ICD-10-CM

## 2024-06-27 LAB — HBA1C MFR BLD: 6.5 %

## 2024-06-27 RX ORDER — VARENICLINE TARTRATE 1 MG/1
1 TABLET, FILM COATED ORAL 2 TIMES DAILY
Qty: 60 TABLET | Refills: 5 | Status: SHIPPED | OUTPATIENT
Start: 2024-06-27

## 2024-06-27 RX ORDER — VARENICLINE TARTRATE 0.5 MG/1
.5-1 TABLET, FILM COATED ORAL SEE ADMIN INSTRUCTIONS
Qty: 57 TABLET | Refills: 0 | Status: SHIPPED | OUTPATIENT
Start: 2024-06-27

## 2024-06-27 ASSESSMENT — ENCOUNTER SYMPTOMS
SORE THROAT: 0
EYE DISCHARGE: 0
EYE REDNESS: 0
NAUSEA: 0
SHORTNESS OF BREATH: 1
VOMITING: 0
DIARRHEA: 0
ABDOMINAL PAIN: 0
RHINORRHEA: 0
WHEEZING: 0
COUGH: 0

## 2024-06-27 ASSESSMENT — PATIENT HEALTH QUESTIONNAIRE - PHQ9
5. POOR APPETITE OR OVEREATING: NOT AT ALL
9. THOUGHTS THAT YOU WOULD BE BETTER OFF DEAD, OR OF HURTING YOURSELF: NOT AT ALL
8. MOVING OR SPEAKING SO SLOWLY THAT OTHER PEOPLE COULD HAVE NOTICED. OR THE OPPOSITE, BEING SO FIGETY OR RESTLESS THAT YOU HAVE BEEN MOVING AROUND A LOT MORE THAN USUAL: NOT AT ALL
4. FEELING TIRED OR HAVING LITTLE ENERGY: NOT AT ALL
2. FEELING DOWN, DEPRESSED OR HOPELESS: NOT AT ALL
1. LITTLE INTEREST OR PLEASURE IN DOING THINGS: NOT AT ALL
SUM OF ALL RESPONSES TO PHQ9 QUESTIONS 1 & 2: 0
7. TROUBLE CONCENTRATING ON THINGS, SUCH AS READING THE NEWSPAPER OR WATCHING TELEVISION: NOT AT ALL
6. FEELING BAD ABOUT YOURSELF - OR THAT YOU ARE A FAILURE OR HAVE LET YOURSELF OR YOUR FAMILY DOWN: NOT AT ALL
SUM OF ALL RESPONSES TO PHQ QUESTIONS 1-9: 0
SUM OF ALL RESPONSES TO PHQ QUESTIONS 1-9: 0
3. TROUBLE FALLING OR STAYING ASLEEP: NOT AT ALL
SUM OF ALL RESPONSES TO PHQ QUESTIONS 1-9: 0
SUM OF ALL RESPONSES TO PHQ QUESTIONS 1-9: 0

## 2024-06-27 NOTE — PROGRESS NOTES
[facility]  [facility] PRIMARY CARE  [address] [address]  [facility] [address]  Dept: [phone]    Malik Acosta Sr. is a 74 y.o. male Established patient, who presents today for his medical conditions/complaints as noted below.      Chief Complaint   Patient presents with    Diabetes       HPI:     Pt presents for T2DM follow up. He is not checking blood sugars at home. He is tolerating Metformin well. He has not had a diabetic eye exam and denies blurry vision or changes in vision.     Swelling BLE has not changed. He saw cardiology 6/24/24. They wrapped his legs with ACE bandages and they started him on Jardiance. No chest pain. He has some SOB with walking that resolves on its on with rest.     He has an intermittent pain in his left hip that has been ongoing for a couple months. Pain improves with Arthritis medications. He denies injury.    He is still currently smoking. He is wanting to quit. He has tried nicotine patches and Chantix.    Reviewed prior notes None  Reviewed previous Labs    No components found for: \"LDLCHOLESTEROL\", \"LDLCALC\"    (goal LDL is <100)   AST (U/L)   Date Value   05/07/2024 33     ALT (U/L)   Date Value   05/07/2024 26     BUN (mg/dL)   Date Value   05/07/2024 11     Hemoglobin A1C (%)   Date Value   06/27/2024 6.5     TSH (uIU/mL)   Date Value   05/11/2022 3.00     BP Readings from Last 3 Encounters:   06/27/24 136/82   06/25/24 138/80   03/14/24 136/78          (goal 120/80)    Past Medical History:   Diagnosis Date    Arthritis     COPD (chronic obstructive pulmonary disease) (HCC)     Diabetes mellitus (HCC)     GURPREET on CPAP       Past Surgical History:   Procedure Laterality Date    APPENDECTOMY  1952    CHOLECYSTECTOMY, LAPAROSCOPIC  1996    JOINT REPLACEMENT Right 11/07/2017    LUMBAR LAMINECTOMY  10/24/2018    L2-L5 laminectomy, foraminotomy    SPINAL CORD STIMULATOR SURGERY Right 05/2021    TONSILLECTOMY  1959    TOTAL KNEE ARTHROPLASTY Left

## 2024-07-15 RX ORDER — GABAPENTIN 300 MG/1
CAPSULE ORAL
Qty: 360 CAPSULE | Refills: 1 | Status: SHIPPED | OUTPATIENT
Start: 2024-07-15 | End: 2025-01-05

## 2024-07-15 NOTE — TELEPHONE ENCOUNTER
LAST VISIT:   6/27/2024     Future Appointments   Date Time Provider Department Center   8/14/2024  4:00 PM Carlton Funk DO [facility]   10/8/2024  8:30 AM [facility]

## 2024-09-04 ENCOUNTER — OFFICE VISIT (OUTPATIENT)
Dept: PRIMARY CARE CLINIC | Age: 74
End: 2024-09-04

## 2024-09-04 VITALS
DIASTOLIC BLOOD PRESSURE: 84 MMHG | TEMPERATURE: 97.8 F | HEART RATE: 97 BPM | HEIGHT: 67 IN | SYSTOLIC BLOOD PRESSURE: 136 MMHG | OXYGEN SATURATION: 94 % | BODY MASS INDEX: 43.16 KG/M2 | WEIGHT: 275 LBS

## 2024-09-04 DIAGNOSIS — J44.1 COPD EXACERBATION (HCC): Primary | ICD-10-CM

## 2024-09-04 RX ORDER — PREDNISONE 20 MG/1
20 TABLET ORAL 2 TIMES DAILY
Qty: 10 TABLET | Refills: 0 | Status: SHIPPED | OUTPATIENT
Start: 2024-09-04 | End: 2024-09-09

## 2024-09-04 RX ORDER — AZITHROMYCIN 250 MG/1
TABLET, FILM COATED ORAL
Qty: 6 TABLET | Refills: 0 | Status: SHIPPED | OUTPATIENT
Start: 2024-09-04 | End: 2024-09-14

## 2024-09-04 ASSESSMENT — ENCOUNTER SYMPTOMS
SHORTNESS OF BREATH: 1
COUGH: 1
ABDOMINAL PAIN: 0
VOMITING: 0
NAUSEA: 0
SORE THROAT: 0

## 2024-09-04 NOTE — PROGRESS NOTES
[facility]  [facility] CARE  [address] [facility] [address]  Dept: [phone]    Malik Acosta Sr. is a 74 y.o. male established patient, who presents acutely for his complaints as noted below:    Chief Complaint   Patient presents with    Cough     Productive cough started Monday     Shortness of Breath       HPI:     Cough for 3 days, SOB. Taking cough syrup.    Reviewed prior notes from PCP.  Reviewed previous labs.    No components found for: \"LDLCHOLESTEROL\", \"LDLCALC\"    (goal LDL is <100)   AST (U/L)   Date Value   2024 33     ALT (U/L)   Date Value   2024 26     BUN (mg/dL)   Date Value   2024 11     Hemoglobin A1C (%)   Date Value   2024 6.5     TSH (uIU/mL)   Date Value   2022 3.00     BP Readings from Last 3 Encounters:   24 136/84   24 136/82   24 138/80          (goal 120/80)    Past Medical History:   Diagnosis Date    Arthritis     COPD (chronic obstructive pulmonary disease) (HCC)     Diabetes mellitus (HCC)     GURPREET on CPAP       Past Surgical History:   Procedure Laterality Date    APPENDECTOMY      CHOLECYSTECTOMY, LAPAROSCOPIC  1996    JOINT REPLACEMENT Right 2017    LUMBAR LAMINECTOMY  10/24/2018    L2-L5 laminectomy, foraminotomy    SPINAL CORD STIMULATOR SURGERY Right 2021    TONSILLECTOMY      TOTAL KNEE ARTHROPLASTY Left 2017       No family history on file.    Social History     Tobacco Use    Smoking status: Every Day     Current packs/day: 0.00     Average packs/day: 1 pack/day for 42.0 years (42.0 ttl pk-yrs)     Types: Cigarettes     Start date: 10/12/1975     Last attempt to quit: 10/12/2017     Years since quittin.9    Smokeless tobacco: Never    Tobacco comments:     Still smoking    Substance Use Topics    Alcohol use: No     Comment: seldom      Current Outpatient Medications   Medication Sig Dispense Refill    predniSONE (DELTASONE) 20 MG tablet Take 1 tablet by

## 2024-10-08 ENCOUNTER — OFFICE VISIT (OUTPATIENT)
Dept: PRIMARY CARE CLINIC | Age: 74
End: 2024-10-08

## 2024-10-08 VITALS
HEIGHT: 67 IN | WEIGHT: 280.2 LBS | OXYGEN SATURATION: 92 % | HEART RATE: 101 BPM | BODY MASS INDEX: 43.98 KG/M2 | DIASTOLIC BLOOD PRESSURE: 72 MMHG | SYSTOLIC BLOOD PRESSURE: 118 MMHG

## 2024-10-08 DIAGNOSIS — E11.9 CONTROLLED TYPE 2 DIABETES MELLITUS WITHOUT COMPLICATION, WITHOUT LONG-TERM CURRENT USE OF INSULIN (HCC): ICD-10-CM

## 2024-10-08 DIAGNOSIS — Z23 NEED FOR VACCINATION: Primary | ICD-10-CM

## 2024-10-08 DIAGNOSIS — J44.1 COPD EXACERBATION (HCC): ICD-10-CM

## 2024-10-08 DIAGNOSIS — Z00.00 MEDICARE ANNUAL WELLNESS VISIT, SUBSEQUENT: ICD-10-CM

## 2024-10-08 LAB — HBA1C MFR BLD: 6.9 %

## 2024-10-08 ASSESSMENT — PATIENT HEALTH QUESTIONNAIRE - PHQ9
SUM OF ALL RESPONSES TO PHQ QUESTIONS 1-9: 0
2. FEELING DOWN, DEPRESSED OR HOPELESS: NOT AT ALL
1. LITTLE INTEREST OR PLEASURE IN DOING THINGS: NOT AT ALL
8. MOVING OR SPEAKING SO SLOWLY THAT OTHER PEOPLE COULD HAVE NOTICED. OR THE OPPOSITE, BEING SO FIGETY OR RESTLESS THAT YOU HAVE BEEN MOVING AROUND A LOT MORE THAN USUAL: NOT AT ALL
3. TROUBLE FALLING OR STAYING ASLEEP: NOT AT ALL
6. FEELING BAD ABOUT YOURSELF - OR THAT YOU ARE A FAILURE OR HAVE LET YOURSELF OR YOUR FAMILY DOWN: NOT AT ALL
SUM OF ALL RESPONSES TO PHQ QUESTIONS 1-9: 0
SUM OF ALL RESPONSES TO PHQ QUESTIONS 1-9: 0
5. POOR APPETITE OR OVEREATING: NOT AT ALL
7. TROUBLE CONCENTRATING ON THINGS, SUCH AS READING THE NEWSPAPER OR WATCHING TELEVISION: NOT AT ALL
9. THOUGHTS THAT YOU WOULD BE BETTER OFF DEAD, OR OF HURTING YOURSELF: NOT AT ALL
SUM OF ALL RESPONSES TO PHQ QUESTIONS 1-9: 0
4. FEELING TIRED OR HAVING LITTLE ENERGY: NOT AT ALL
SUM OF ALL RESPONSES TO PHQ9 QUESTIONS 1 & 2: 0

## 2024-10-08 ASSESSMENT — LIFESTYLE VARIABLES
HOW MANY STANDARD DRINKS CONTAINING ALCOHOL DO YOU HAVE ON A TYPICAL DAY: PATIENT DOES NOT DRINK
HOW OFTEN DO YOU HAVE A DRINK CONTAINING ALCOHOL: MONTHLY OR LESS

## 2024-10-08 NOTE — PATIENT INSTRUCTIONS
Learning About Being Active as an Older Adult  Why is being active important as you get older?     Being active is one of the best things you can do for your health. And it's never too late to start. Being active--or getting active, if you aren't already--has definite benefits. It can:  Give you more energy,  Keep your mind sharp.  Improve balance to reduce your risk of falls.  Help you manage chronic illness with fewer medicines.  No matter how old you are, how fit you are, or what health problems you have, there is a form of activity that will work for you. And the more physical activity you can do, the better your overall health will be.  What kinds of activity can help you stay healthy?  Being more active will make your daily activities easier. Physical activity includes planned exercise and things you do in daily life. There are four types of activity:  Aerobic.  Doing aerobic activity makes your heart and lungs strong.  Includes walking, dancing, and gardening.  Aim for at least 2½ hours spread throughout the week.  It improves your energy and can help you sleep better.  Muscle-strengthening.  This type of activity can help maintain muscle and strengthen bones.  Includes climbing stairs, using resistance bands, and lifting or carrying heavy loads.  Aim for at least twice a week.  It can help protect the knees and other joints.  Stretching.  Stretching gives you better range of motion in joints and muscles.  Includes upper arm stretches, calf stretches, and gentle yoga.  Aim for at least twice a week, preferably after your muscles are warmed up from other activities.  It can help you function better in daily life.  Balancing.  This helps you stay coordinated and have good posture.  Includes heel-to-toe walking, nelson chi, and certain types of yoga.  Aim for at least 3 days a week.  It can reduce your risk of falling.  Even if you have a hard time meeting the recommendations, it's better to be more active

## 2024-10-08 NOTE — PROGRESS NOTES
Pt arrived from PACU via bed to room 1. Wife in room. Ice chips at bedside. DTV.   diabetic medications as is  If A1c goes any higher, may need to adjust medication

## 2024-10-23 NOTE — PROGRESS NOTES
Pre-procedure phone call completed. Reviewed arrival time  1130 , Entrance A and NPO. Confirmed that patient has an adult  and that they have someone available to stay with them if they are discharged to home afterward.

## 2024-10-24 ENCOUNTER — HOSPITAL ENCOUNTER (OUTPATIENT)
Age: 74
Setting detail: OUTPATIENT SURGERY
Discharge: HOME | End: 2024-10-24
Attending: INTERNAL MEDICINE | Admitting: INTERNAL MEDICINE
Payer: MEDICARE

## 2024-10-24 VITALS
RESPIRATION RATE: 21 BRPM | HEIGHT: 67 IN | HEART RATE: 85 BPM | TEMPERATURE: 97.6 F | BODY MASS INDEX: 44.39 KG/M2 | OXYGEN SATURATION: 92 % | SYSTOLIC BLOOD PRESSURE: 120 MMHG | WEIGHT: 282.85 LBS | DIASTOLIC BLOOD PRESSURE: 69 MMHG

## 2024-10-24 DIAGNOSIS — R94.39 ABNORMAL STRESS TEST: ICD-10-CM

## 2024-10-24 LAB
BUN BLD-MCNC: 10 MG/DL (ref 8–26)
CA-I BLD-SCNC: 1.25 MMOL/L (ref 1.15–1.33)
CHLORIDE BLD-SCNC: 102 MMOL/L (ref 98–107)
ECHO BSA: 2.47 M2
EGFR, POC: >90 ML/MIN/1.73M2
GLUCOSE BLD-MCNC: 133 MG/DL (ref 74–100)
HCT VFR BLD AUTO: 52 % (ref 41–53)
PLATELET # BLD AUTO: 244 K/UL (ref 140–450)
POC CREATININE: 0.6 MG/DL (ref 0.51–1.19)
POC HEMOGLOBIN (CALC): 17.6 G/DL (ref 13.5–17.5)
POTASSIUM BLD-SCNC: 3.9 MMOL/L (ref 3.5–4.5)
SODIUM BLD-SCNC: 141 MMOL/L (ref 138–146)

## 2024-10-24 PROCEDURE — 93454 CORONARY ARTERY ANGIO S&I: CPT | Performed by: INTERNAL MEDICINE

## 2024-10-24 PROCEDURE — 82435 ASSAY OF BLOOD CHLORIDE: CPT

## 2024-10-24 PROCEDURE — 2709999900 HC NON-CHARGEABLE SUPPLY: Performed by: INTERNAL MEDICINE

## 2024-10-24 PROCEDURE — 82565 ASSAY OF CREATININE: CPT

## 2024-10-24 PROCEDURE — 7100000011 HC PHASE II RECOVERY - ADDTL 15 MIN: Performed by: INTERNAL MEDICINE

## 2024-10-24 PROCEDURE — 82947 ASSAY GLUCOSE BLOOD QUANT: CPT

## 2024-10-24 PROCEDURE — 82330 ASSAY OF CALCIUM: CPT

## 2024-10-24 PROCEDURE — 7100000010 HC PHASE II RECOVERY - FIRST 15 MIN: Performed by: INTERNAL MEDICINE

## 2024-10-24 PROCEDURE — 2500000003 HC RX 250 WO HCPCS: Performed by: INTERNAL MEDICINE

## 2024-10-24 PROCEDURE — 85049 AUTOMATED PLATELET COUNT: CPT

## 2024-10-24 PROCEDURE — C1769 GUIDE WIRE: HCPCS | Performed by: INTERNAL MEDICINE

## 2024-10-24 PROCEDURE — 84520 ASSAY OF UREA NITROGEN: CPT

## 2024-10-24 PROCEDURE — 6360000002 HC RX W HCPCS: Performed by: INTERNAL MEDICINE

## 2024-10-24 PROCEDURE — 85014 HEMATOCRIT: CPT

## 2024-10-24 PROCEDURE — 99152 MOD SED SAME PHYS/QHP 5/>YRS: CPT | Performed by: INTERNAL MEDICINE

## 2024-10-24 PROCEDURE — C1894 INTRO/SHEATH, NON-LASER: HCPCS | Performed by: INTERNAL MEDICINE

## 2024-10-24 PROCEDURE — 6370000000 HC RX 637 (ALT 250 FOR IP): Performed by: INTERNAL MEDICINE

## 2024-10-24 PROCEDURE — 84132 ASSAY OF SERUM POTASSIUM: CPT

## 2024-10-24 PROCEDURE — 99222 1ST HOSP IP/OBS MODERATE 55: CPT | Performed by: INTERNAL MEDICINE

## 2024-10-24 PROCEDURE — 6360000004 HC RX CONTRAST MEDICATION: Performed by: INTERNAL MEDICINE

## 2024-10-24 PROCEDURE — 84295 ASSAY OF SERUM SODIUM: CPT

## 2024-10-24 RX ORDER — MIDAZOLAM HYDROCHLORIDE 1 MG/ML
INJECTION, SOLUTION INTRAMUSCULAR; INTRAVENOUS PRN
Status: DISCONTINUED | OUTPATIENT
Start: 2024-10-24 | End: 2024-10-24 | Stop reason: HOSPADM

## 2024-10-24 RX ORDER — SODIUM CHLORIDE 9 MG/ML
INJECTION, SOLUTION INTRAVENOUS PRN
Status: DISCONTINUED | OUTPATIENT
Start: 2024-10-24 | End: 2024-10-24 | Stop reason: HOSPADM

## 2024-10-24 RX ORDER — HEPARIN SODIUM 1000 [USP'U]/ML
INJECTION, SOLUTION INTRAVENOUS; SUBCUTANEOUS PRN
Status: DISCONTINUED | OUTPATIENT
Start: 2024-10-24 | End: 2024-10-24 | Stop reason: HOSPADM

## 2024-10-24 RX ORDER — FENTANYL CITRATE 50 UG/ML
INJECTION, SOLUTION INTRAMUSCULAR; INTRAVENOUS PRN
Status: DISCONTINUED | OUTPATIENT
Start: 2024-10-24 | End: 2024-10-24 | Stop reason: HOSPADM

## 2024-10-24 RX ORDER — SODIUM CHLORIDE 9 MG/ML
INJECTION, SOLUTION INTRAVENOUS CONTINUOUS
Status: DISCONTINUED | OUTPATIENT
Start: 2024-10-24 | End: 2024-10-24

## 2024-10-24 RX ORDER — IOPAMIDOL 755 MG/ML
INJECTION, SOLUTION INTRAVASCULAR PRN
Status: DISCONTINUED | OUTPATIENT
Start: 2024-10-24 | End: 2024-10-24 | Stop reason: HOSPADM

## 2024-10-24 RX ORDER — ASPIRIN 325 MG
325 TABLET ORAL DAILY
Status: DISCONTINUED | OUTPATIENT
Start: 2024-10-24 | End: 2024-10-24 | Stop reason: HOSPADM

## 2024-10-24 RX ORDER — SODIUM CHLORIDE 0.9 % (FLUSH) 0.9 %
5-40 SYRINGE (ML) INJECTION PRN
Status: DISCONTINUED | OUTPATIENT
Start: 2024-10-24 | End: 2024-10-24 | Stop reason: HOSPADM

## 2024-10-24 RX ORDER — SODIUM CHLORIDE 0.9 % (FLUSH) 0.9 %
5-40 SYRINGE (ML) INJECTION EVERY 12 HOURS SCHEDULED
Status: DISCONTINUED | OUTPATIENT
Start: 2024-10-24 | End: 2024-10-24 | Stop reason: HOSPADM

## 2024-10-24 RX ORDER — ACETAMINOPHEN 325 MG/1
650 TABLET ORAL EVERY 4 HOURS PRN
Status: DISCONTINUED | OUTPATIENT
Start: 2024-10-24 | End: 2024-10-24 | Stop reason: HOSPADM

## 2024-10-24 RX ADMIN — ASPIRIN 325 MG: 325 TABLET ORAL at 13:06

## 2024-10-24 NOTE — H&P
[facility]   Admission Note                 Date:   10/24/2024  Patient name: Malik Acosta Sr.  Date of admission:  10/24/2024 11:28 AM  MRN:   [id number]  YOB: 1950    Reason for Admission:  positive stress test    CHIEF COMPLAINT:  SOB.      History Obtained From:  patient    HISTORY OF PRESENT ILLNESS:      The patient is a 74 y.o.  male who is admitted to the hospital for SOB.   SOB with exertion, no chest pain, positive stress test for ischemia.     Past Medical History:   has a past medical history of Arthritis, COPD (chronic obstructive pulmonary disease) (HCC), Diabetes mellitus (HCC), and GURPREET on CPAP.    Past Surgical History:   has a past surgical history that includes Appendectomy (1952); Tonsillectomy (1959); Cholecystectomy, laparoscopic (1996); Total knee arthroplasty (Left, 02/01/2017); joint replacement (Right, 11/07/2017); lumbar laminectomy (10/24/2018); and Spinal Cord Stimulator Surgery (Right, 05/2021).     Home Medications:    Prior to Admission medications    Medication Sig Start Date End Date Taking? Authorizing Provider   celecoxib (CELEBREX) 200 MG capsule Take 1 capsule by mouth daily 8/8/24   Provider, MD Ilana   gabapentin (NEURONTIN) 300 MG capsule TAKE 1 CAPSULE BY MOUTH 4 TIMES A DAY 7/15/24 1/5/25  Graham Jose MD   varenicline (CHANTIX) 1 MG tablet Take 1 tablet by mouth 2 times daily 6/27/24   Graham Jose MD   varenicline (CHANTIX) 0.5 MG tablet Take 1-2 tablets by mouth See Admin Instructions 0.5mg DAILY for 3 days followed by 0.5mg TWICE DAILY for 4 days followed by 1mg TWICE DAILY 6/27/24   Graham Jose MD   bumetanide (BUMEX) 2 MG tablet Take 1 tablet by mouth daily 6/25/24   Carlton Funk DO   empagliflozin (JARDIANCE) 10 MG tablet Take 1 tablet by mouth daily 6/25/24   Carlton Funk DO   metoprolol tartrate (LOPRESSOR) 25 MG tablet Take 1 tablet by mouth 2 times daily 6/25/24   Carlton Funk DO

## 2024-10-24 NOTE — DISCHARGE INSTRUCTIONS
DISCHARGE INSTRUCTIONS / ARM CARE POST CATHERIZATION    Home Care     Ok to shower in 24 hours. Discontinue pressure dressing in 24 hours.   Do not apply band aids. KEEP SITE CLEAN DRY AND OPEN TO THE AIR.  No powder or lotion.  Do not soak in a pool or tub and do not swim for one week.   If there is any bleeding at the catheter site, apply firm pressure directly over site with your hand until the bleeding stops. If bleeding continues after 3 minutes call 911.   If there is any swelling or firm areas at your puncture site, this could be bleeding under the skin(hematoma), and if you have any concerns seek help immediately.   Drink plenty of fluids after the test. This will flush the x-ray dye from your system.   Return to your normal diet.         NO STRENUOUS LIFTING WITH AFFECTED ARM FOR 3 DAYS ANYTHING HEAVIER THAN 8 TO 10 POUNDS    WATCH FOR SIGNS OF INFECTION /  REDNESS / SWELLING / DRAINAGE / WARMTH / TEMPERATURE GREATER THAN 101    IF AREA BECOMES HARD AND SWOLLEN AND IF YOU ARE AT ALL CONCERNED SEEK HELP IMMEDIATELY    SEEK HELP IMMEDIATELY IF AFFECTED ARM BECOMES COLD / NUMB / SEVERE PAIN / NAILBEDS TURN BLUE     IF ON METFORMIN / GLUCOPHAGE DO NOT RESTART MEDICATION FOR 48 HOURS    CALL 911 if you have symptoms including:   Drooping facial muscles   Changes in vision or speech   Difficulty walking or using your limbs   Change in sensation to affected leg, including numbness, feeling cold, or change in color   Extreme sweating, nausea or vomiting   Dizziness or lightheadedness   Chest pain   Rapid, irregular heartbeat   Palpitations   Cough, shortness of breath, or difficulty breathing   Weakness or fainting   If you think you have an emergency, CALL 911 .        Coronary artery disease (CAD) occurs when plaque builds up in the arteries that bring oxygen-rich blood to your heart. Plaque is a fatty substance made of cholesterol, calcium, and other substances in the blood. This process is called hardening of  the arteries, or atherosclerosis.  What happens when you have coronary artery disease?  Plaque may narrow the coronary arteries. Narrowed arteries cause poor blood flow. This can lead to angina symptoms such as chest pain or discomfort. If blood flow is completely blocked, you could have a heart attack.  You can slow CAD and reduce the risk of future problems by making changes in your lifestyle. These include quitting smoking and eating heart-healthy foods.  Treatments for CAD, along with changes in your lifestyle, can help you live a longer and healthier life.  How can you prevent coronary artery disease?  Do not smoke. It may be the best thing you can do to prevent heart disease. If you need help quitting, talk to your doctor about stop-smoking programs and medicines. These can increase your chances of quitting for good.  Be active. Get at least 30 minutes of exercise on most days of the week. Walking is a good choice. You also may want to do other activities, such as running, swimming, cycling, or playing tennis or team sports.  Eat heart-healthy foods. Eat more fruits and vegetables and less foods that contain saturated and trans fats. Limit alcohol, sodium, and sweets.  Stay at a healthy weight. Lose weight if you need to.  Manage other health problems such as diabetes, high blood pressure, and high cholesterol.  Talk to your doctor about taking a daily aspirin.  Manage stress. Stress can hurt your heart. To keep stress low, talk about your problems and feelings. Don't keep your feelings hidden.  How is coronary artery disease treated?  Your doctor will suggest that you make lifestyle changes. For example, your doctor may ask you to eat healthy foods, quit smoking, lose extra weight, and be more active.  You will have to take medicines.  Your doctor may suggest a procedure to open narrowed or blocked arteries. This is called angioplasty. Or your doctor may suggest using healthy blood vessels to create detours

## 2024-11-07 ENCOUNTER — TELEPHONE (OUTPATIENT)
Dept: PHARMACY | Facility: CLINIC | Age: 74
End: 2024-11-07

## 2024-11-07 NOTE — TELEPHONE ENCOUNTER
[facility]INICAL [facility]ARMAC[facility]: ADHERENCE REVIEW  Identified care gap per [employer]: fills at [facility]: Statin adherence    Patient also appears to be prescribed: ACE/ARB and Diabetes (no ACEi/benazepril fills YTD; DM 80% PDC YTD, passed measure year)    ASSESSMENT  STATIN ADHERENCE    Insurance Records claims through 10/21/24 (Prior Year PDC = 91% - PASSED ; YTD PDC = 82%; Potential Fail Date: 11/10/24):   ATORVASTATIN TAB 20MG last filled on 24 for 90 day supply. Next refill due: 10/10/24    Prescribed si tablet/capsule daily    Per Reconcile Dispense History: 2 refills remain    Per [facility]: unable to reach staff, left message asking for refill to be processed since past due    Lab Results   Component Value Date    CHOL 133 2018    TRIG 123 2018    HDL 34 (L) 2024     Lab Results   Component Value Date    LDL 21 2024      ALT   Date Value Ref Range Status   2024 26 10 - 50 U/L Final     AST   Date Value Ref Range Status   2024 33 10 - 50 U/L Final     The ASCVD Risk score (Henri DK, et al., 2019) failed to calculate for the following reasons:    The valid total cholesterol range is 130 to 320 mg/dL     PLAN  The following are interventions that have been identified:   Patient OVERDUE refilling atorvastatin 20mg daily and active on home medication list.   Refill/s of atorvastatin should be in process / READY to  at patient's [facility] pharmacy  Need to confirm if patient is taking benazepril (no fills YTD?) differently than prescribed and need to obtain an updated order if so    Attempting to reach patient to review.  Left message asking for return call. NextBiot message sent to patient.    Last Visit: 10/8/24  Next Visit: 25    Tasha Pineda PharmD, [facility]  Population Health Pharmacist  [facility] Clinical Pharmacy  Department, toll free: [phone], option 1     =======================================================   For Pharmacy Admin Tracking

## 2025-02-21 PROBLEM — I50.33 ACUTE ON CHRONIC DIASTOLIC (CONGESTIVE) HEART FAILURE: Status: ACTIVE | Noted: 2025-02-21

## 2025-03-10 SDOH — ECONOMIC STABILITY: FOOD INSECURITY: WITHIN THE PAST 12 MONTHS, YOU WORRIED THAT YOUR FOOD WOULD RUN OUT BEFORE YOU GOT MONEY TO BUY MORE.: NEVER TRUE

## 2025-03-10 SDOH — ECONOMIC STABILITY: FOOD INSECURITY: WITHIN THE PAST 12 MONTHS, THE FOOD YOU BOUGHT JUST DIDN'T LAST AND YOU DIDN'T HAVE MONEY TO GET MORE.: NEVER TRUE

## 2025-03-10 SDOH — ECONOMIC STABILITY: INCOME INSECURITY: IN THE LAST 12 MONTHS, WAS THERE A TIME WHEN YOU WERE NOT ABLE TO PAY THE MORTGAGE OR RENT ON TIME?: NO

## 2025-03-10 SDOH — ECONOMIC STABILITY: TRANSPORTATION INSECURITY
IN THE PAST 12 MONTHS, HAS THE LACK OF TRANSPORTATION KEPT YOU FROM MEDICAL APPOINTMENTS OR FROM GETTING MEDICATIONS?: NO

## 2025-03-10 SDOH — ECONOMIC STABILITY: TRANSPORTATION INSECURITY
IN THE PAST 12 MONTHS, HAS LACK OF TRANSPORTATION KEPT YOU FROM MEETINGS, WORK, OR FROM GETTING THINGS NEEDED FOR DAILY LIVING?: NO

## 2025-03-10 ASSESSMENT — PATIENT HEALTH QUESTIONNAIRE - PHQ9
1. LITTLE INTEREST OR PLEASURE IN DOING THINGS: SEVERAL DAYS
4. FEELING TIRED OR HAVING LITTLE ENERGY: NOT AT ALL
7. TROUBLE CONCENTRATING ON THINGS, SUCH AS READING THE NEWSPAPER OR WATCHING TELEVISION: NOT AT ALL
6. FEELING BAD ABOUT YOURSELF - OR THAT YOU ARE A FAILURE OR HAVE LET YOURSELF OR YOUR FAMILY DOWN: NOT AT ALL
10. IF YOU CHECKED OFF ANY PROBLEMS, HOW DIFFICULT HAVE THESE PROBLEMS MADE IT FOR YOU TO DO YOUR WORK, TAKE CARE OF THINGS AT HOME, OR GET ALONG WITH OTHER PEOPLE: NOT DIFFICULT AT ALL
8. MOVING OR SPEAKING SO SLOWLY THAT OTHER PEOPLE COULD HAVE NOTICED. OR THE OPPOSITE, BEING SO FIGETY OR RESTLESS THAT YOU HAVE BEEN MOVING AROUND A LOT MORE THAN USUAL: NOT AT ALL
SUM OF ALL RESPONSES TO PHQ QUESTIONS 1-9: 2
5. POOR APPETITE OR OVEREATING: NOT AT ALL
5. POOR APPETITE OR OVEREATING: NOT AT ALL
6. FEELING BAD ABOUT YOURSELF - OR THAT YOU ARE A FAILURE OR HAVE LET YOURSELF OR YOUR FAMILY DOWN: NOT AT ALL
SUM OF ALL RESPONSES TO PHQ QUESTIONS 1-9: 2
2. FEELING DOWN, DEPRESSED OR HOPELESS: SEVERAL DAYS
3. TROUBLE FALLING OR STAYING ASLEEP: NOT AT ALL
SUM OF ALL RESPONSES TO PHQ QUESTIONS 1-9: 2
9. THOUGHTS THAT YOU WOULD BE BETTER OFF DEAD, OR OF HURTING YOURSELF: NOT AT ALL
1. LITTLE INTEREST OR PLEASURE IN DOING THINGS: SEVERAL DAYS
3. TROUBLE FALLING OR STAYING ASLEEP: NOT AT ALL
2. FEELING DOWN, DEPRESSED OR HOPELESS: SEVERAL DAYS
7. TROUBLE CONCENTRATING ON THINGS, SUCH AS READING THE NEWSPAPER OR WATCHING TELEVISION: NOT AT ALL
8. MOVING OR SPEAKING SO SLOWLY THAT OTHER PEOPLE COULD HAVE NOTICED. OR THE OPPOSITE - BEING SO FIDGETY OR RESTLESS THAT YOU HAVE BEEN MOVING AROUND A LOT MORE THAN USUAL: NOT AT ALL
9. THOUGHTS THAT YOU WOULD BE BETTER OFF DEAD, OR OF HURTING YOURSELF: NOT AT ALL
10. IF YOU CHECKED OFF ANY PROBLEMS, HOW DIFFICULT HAVE THESE PROBLEMS MADE IT FOR YOU TO DO YOUR WORK, TAKE CARE OF THINGS AT HOME, OR GET ALONG WITH OTHER PEOPLE: NOT DIFFICULT AT ALL
4. FEELING TIRED OR HAVING LITTLE ENERGY: NOT AT ALL

## 2025-03-11 ENCOUNTER — OFFICE VISIT (OUTPATIENT)
Dept: PRIMARY CARE CLINIC | Age: 75
End: 2025-03-11

## 2025-03-11 VITALS
DIASTOLIC BLOOD PRESSURE: 78 MMHG | HEART RATE: 68 BPM | SYSTOLIC BLOOD PRESSURE: 130 MMHG | OXYGEN SATURATION: 93 % | HEIGHT: 67 IN | BODY MASS INDEX: 45.36 KG/M2 | WEIGHT: 289 LBS

## 2025-03-11 DIAGNOSIS — Z00.00 MEDICARE ANNUAL WELLNESS VISIT, SUBSEQUENT: ICD-10-CM

## 2025-03-11 DIAGNOSIS — E11.9 CONTROLLED TYPE 2 DIABETES MELLITUS WITHOUT COMPLICATION, WITHOUT LONG-TERM CURRENT USE OF INSULIN (HCC): Primary | ICD-10-CM

## 2025-03-11 DIAGNOSIS — Z87.891 PERSONAL HISTORY OF TOBACCO USE: ICD-10-CM

## 2025-03-11 DIAGNOSIS — E66.813 OBESITY, CLASS 3: ICD-10-CM

## 2025-03-11 PROBLEM — J44.9 COPD (CHRONIC OBSTRUCTIVE PULMONARY DISEASE) (HCC): Status: RESOLVED | Noted: 2017-10-25 | Resolved: 2025-03-11

## 2025-03-11 LAB — HBA1C MFR BLD: 6.4 %

## 2025-03-11 RX ORDER — BENAZEPRIL HYDROCHLORIDE 20 MG/1
TABLET ORAL
Qty: 90 TABLET | Refills: 3 | Status: SHIPPED | OUTPATIENT
Start: 2025-03-11

## 2025-03-11 RX ORDER — ATORVASTATIN CALCIUM 20 MG/1
20 TABLET, FILM COATED ORAL NIGHTLY
Qty: 90 TABLET | Refills: 3 | Status: SHIPPED | OUTPATIENT
Start: 2025-03-11

## 2025-03-11 NOTE — PROGRESS NOTES
Medicare Annual Wellness Visit    Malik Acosta  is here for Medicare AWV (Medicare wellness visit )    Assessment & Plan  1. Chronic Obstructive Pulmonary Disease (COPD).  He continues to smoke despite his COPD diagnosis. He reports that his lung scan came out clear. [facility]    2. Diabetes Mellitus.  He is currently taking Jardiance for diabetes management. He has not been checking his blood sugar levels regularly. A urine test for protein will be ordered to screen for diabetic nephropathy. [facility]ation for diabetes [facility].    3. Sleep Apnea.  He reports consistent use of his CPAP machine and finds it helpful.    4. Hypertension.  He has stopped taking Lotensin for blood pressure management. He does not check his blood pressure outside the office.    5. Medicare wellness visit.  He is due for a low-dose CT scan of the chest for lung cancer screening. He is also due for blood work in September 2025 and cholesterol check in May 2025. He has completed his shingles vaccinations.  Controlled type 2 diabetes mellitus without complication, without long-term current use of insulin ([facility])  -     POCT glycosylated hemoglobin (Hb A1C)  -     Albumin/Creatinine Ratio, Urine; Future  -     atorvastatin (LIPITOR) 20 MG tablet; Take 1 tablet by mouth nightly, Disp-90 tablet, R-3Normal  Personal history of tobacco use  -     [place] VISIT TO DISCUSS LUNG CA SCREEN W LDCT  -     CT Lung Screen (Initial/Annual/Baseline); Future  Medicare annual wellness visit, subsequent  Obesity, class 3 (E66.813)  Body mass index [BMI] 45.0-49.9, adult (Z68.42)    Results  Imaging  Lung scan came out clear.     Return in about 4 months (around 7/11/2025) for DIABETES.     Subjective   The following acute and/or chronic problems were also addressed today:  NIDDM  COPD  History of Present Illness  The patient is a 75-year-old male who presents for his Medicare wellness visit.    He has

## 2025-03-17 ENCOUNTER — TELEPHONE (OUTPATIENT)
Dept: PRIMARY CARE CLINIC | Age: 75
End: 2025-03-17

## 2025-03-17 NOTE — TELEPHONE ENCOUNTER
Advise patient that glipizide is nowhere near the equivalent to the Jardiance.  Jardiance is much superior.  Asked patient if it is now more expensive just because he is in his co-pay?

## 2025-03-17 NOTE — TELEPHONE ENCOUNTER
Patient states the Jardiance is too expansive. Patient is asking if glipizide is an option for an alternative medication?    Pharmacy confirmed. Please advise.

## 2025-03-19 RX ORDER — GABAPENTIN 300 MG/1
CAPSULE ORAL
Qty: 360 CAPSULE | Refills: 0 | Status: SHIPPED | OUTPATIENT
Start: 2025-03-19 | End: 2025-06-19

## 2025-03-19 RX ORDER — CELECOXIB 200 MG/1
CAPSULE ORAL DAILY
Qty: 90 CAPSULE | Refills: 0 | Status: SHIPPED | OUTPATIENT
Start: 2025-03-19

## 2025-03-19 NOTE — TELEPHONE ENCOUNTER
LAST VISIT:   3/11/2025     Future Appointments   Date Time Provider Department Center   4/25/2025 11:15 AM Paul Jean MD [facility]   7/14/2025  1:15 PM [facility]       Pharmacy verified? Yes    [id number] - [place], [place] - [address] - P [phone] - F [phone] [id number]  [place] [address]  Phone: [phone] Fax: [phone]

## 2025-06-04 RX ORDER — TRAZODONE HYDROCHLORIDE 50 MG/1
50 TABLET ORAL NIGHTLY
Qty: 90 TABLET | Refills: 0 | Status: SHIPPED | OUTPATIENT
Start: 2025-06-04

## 2025-06-17 RX ORDER — GABAPENTIN 300 MG/1
CAPSULE ORAL
Qty: 360 CAPSULE | Refills: 0 | Status: SHIPPED | OUTPATIENT
Start: 2025-06-17 | End: 2025-09-17

## 2025-06-25 RX ORDER — TRAZODONE HYDROCHLORIDE 50 MG/1
50 TABLET ORAL NIGHTLY
Qty: 90 TABLET | Refills: 0 | Status: SHIPPED | OUTPATIENT
Start: 2025-06-25

## 2025-06-26 DIAGNOSIS — J44.9 CHRONIC OBSTRUCTIVE PULMONARY DISEASE, UNSPECIFIED COPD TYPE (HCC): ICD-10-CM

## 2025-06-26 RX ORDER — BUDESONIDE AND FORMOTEROL FUMARATE DIHYDRATE 160; 4.5 UG/1; UG/1
AEROSOL RESPIRATORY (INHALATION)
Qty: 10.2 G | Refills: 0 | Status: SHIPPED | OUTPATIENT
Start: 2025-06-26

## 2025-07-14 ENCOUNTER — OFFICE VISIT (OUTPATIENT)
Dept: PRIMARY CARE CLINIC | Age: 75
End: 2025-07-14

## 2025-07-14 VITALS
HEIGHT: 67 IN | DIASTOLIC BLOOD PRESSURE: 80 MMHG | SYSTOLIC BLOOD PRESSURE: 134 MMHG | OXYGEN SATURATION: 93 % | WEIGHT: 285 LBS | HEART RATE: 69 BPM | BODY MASS INDEX: 44.73 KG/M2

## 2025-07-14 DIAGNOSIS — Z87.891 PERSONAL HISTORY OF TOBACCO USE: ICD-10-CM

## 2025-07-14 DIAGNOSIS — I50.33 ACUTE ON CHRONIC DIASTOLIC (CONGESTIVE) HEART FAILURE (HCC): ICD-10-CM

## 2025-07-14 DIAGNOSIS — Z13.6 ENCOUNTER FOR LIPID SCREENING FOR CARDIOVASCULAR DISEASE: ICD-10-CM

## 2025-07-14 DIAGNOSIS — Z13.220 ENCOUNTER FOR LIPID SCREENING FOR CARDIOVASCULAR DISEASE: ICD-10-CM

## 2025-07-14 DIAGNOSIS — E66.813 OBESITY, CLASS 3 (HCC): ICD-10-CM

## 2025-07-14 DIAGNOSIS — J44.9 CHRONIC OBSTRUCTIVE PULMONARY DISEASE, UNSPECIFIED COPD TYPE (HCC): ICD-10-CM

## 2025-07-14 DIAGNOSIS — Z12.5 SCREENING PSA (PROSTATE SPECIFIC ANTIGEN): ICD-10-CM

## 2025-07-14 DIAGNOSIS — E11.9 CONTROLLED TYPE 2 DIABETES MELLITUS WITHOUT COMPLICATION, WITHOUT LONG-TERM CURRENT USE OF INSULIN (HCC): Primary | ICD-10-CM

## 2025-07-14 DIAGNOSIS — Z00.00 ANNUAL PHYSICAL EXAM: ICD-10-CM

## 2025-07-14 LAB — HBA1C MFR BLD: 6.1 %

## 2025-07-14 RX ORDER — ALBUTEROL SULFATE 90 UG/1
2 INHALANT RESPIRATORY (INHALATION) EVERY 6 HOURS PRN
Qty: 8.5 G | Refills: 5 | Status: SHIPPED | OUTPATIENT
Start: 2025-07-14

## 2025-07-14 RX ORDER — EPLERENONE 25 MG/1
25 TABLET ORAL DAILY
Qty: 30 TABLET | Refills: 3 | Status: SHIPPED | OUTPATIENT
Start: 2025-07-14

## 2025-07-14 ASSESSMENT — ENCOUNTER SYMPTOMS
EYE DISCHARGE: 0
RHINORRHEA: 0
SHORTNESS OF BREATH: 0
COUGH: 0
SORE THROAT: 0
VOMITING: 0
EYE REDNESS: 0
ABDOMINAL PAIN: 0
WHEEZING: 0
DIARRHEA: 0
NAUSEA: 0

## 2025-07-14 NOTE — PROGRESS NOTES
[facility]  [facility] PRIMARY CARE  [address]  [facility] [address]  Dept: [phone]    Malik Acosta Sr. is a 75 y.o. male Established patient, who presents today for his medical conditions/complaints as noted below.      Chief Complaint   Patient presents with    Diabetes     DM check up        HPI:     History of Present Illness  The patient is a 75-year-old male who presents for evaluation of left hip pain, diabetes mellitus, hypertension, and lower extremity edema.    He reports experiencing bone-on-bone pain in his left hip. An orthopedic specialist has advised losing 50 pounds before any intervention can be considered. His mobility is significantly limited due to the hip pain, making it difficult to walk through the house or even to the bathroom. He uses a walker for support.    He does not monitor his blood sugar levels at home and does not own a glucometer. He has not experienced any episodes of low blood sugar reactions.    His cardiology medications have remained unchanged, and he is currently taking benazepril and metoprolol. He occasionally experiences labored breathing and cannot recall when his last echocardiogram was performed.    He reports swelling in his feet and legs, which has been present for quite a while. This swelling makes it challenging for him to wear shoes. He attributes this to prolonged sitting throughout the day, as he does not engage in physical activity due to his hip pain.    Hobbies: Playing games, watching TV      Reviewed prior notes: None  Reviewed previous: Labs    LDL Cholesterol (mg/dL)   Date Value   05/07/2024 21     LDL Calculated (mg/dL)   Date Value   01/12/2018 65     HDL (mg/dL)   Date Value   05/07/2024 34 (L)       (goal LDL is <100)   AST (U/L)   Date Value   05/07/2024 33     ALT (U/L)   Date Value   05/07/2024 26     BUN (mg/dL)   Date Value   09/17/2024 15     Hemoglobin A1C (%)   Date Value   07/14/2025 6.1     TSH

## 2025-08-07 RX ORDER — GABAPENTIN 300 MG/1
CAPSULE ORAL
Qty: 360 CAPSULE | Refills: 0 | Status: SHIPPED | OUTPATIENT
Start: 2025-08-07 | End: 2025-11-07

## 2025-08-18 RX ORDER — BLOOD-GLUCOSE METER
KIT MISCELLANEOUS
Qty: 1 KIT | Refills: 0 | Status: SHIPPED | OUTPATIENT
Start: 2025-08-18

## 2025-08-18 RX ORDER — GLUCOSAMINE HCL/CHONDROITIN SU 500-400 MG
CAPSULE ORAL
Qty: 100 STRIP | Refills: 3 | Status: SHIPPED | OUTPATIENT
Start: 2025-08-18

## 2025-08-18 RX ORDER — LANCETS
EACH MISCELLANEOUS
Qty: 100 EACH | Refills: 3 | Status: SHIPPED | OUTPATIENT
Start: 2025-08-18

## (undated) DEVICE — GLIDESHEATH SLENDER STAINLESS STEEL KIT: Brand: GLIDESHEATH SLENDER

## (undated) DEVICE — 6FR JL3.5 CORDIS DIAG CATHETER 100CM

## (undated) DEVICE — GUIDEWIRE 35/260/FC/PTFE/3J: Brand: GUIDEWIRE

## (undated) DEVICE — BAND COMPR L24CM REG CLR PLAS HEMSTAT EXT HK AND LOOP RETEN

## (undated) DEVICE — TRAY SURG CUST CRD CATH TOLEDO

## (undated) DEVICE — 6FR JR4 CORDIS  DIAG CATHETER 100CM

## (undated) DEVICE — STRAP ARMBRD W1.5XL32IN FOAM STR YET SFT W/ HK AND LOOP